# Patient Record
Sex: FEMALE | Race: WHITE | Employment: OTHER | URBAN - METROPOLITAN AREA
[De-identification: names, ages, dates, MRNs, and addresses within clinical notes are randomized per-mention and may not be internally consistent; named-entity substitution may affect disease eponyms.]

---

## 2017-02-11 ENCOUNTER — LAB ENCOUNTER (OUTPATIENT)
Dept: LAB | Facility: HOSPITAL | Age: 77
End: 2017-02-11
Attending: INTERNAL MEDICINE
Payer: MEDICARE

## 2017-02-11 DIAGNOSIS — N18.30 CHRONIC KIDNEY DISEASE, STAGE III (MODERATE) (HCC): Primary | ICD-10-CM

## 2017-02-11 DIAGNOSIS — E11.9 DIABETES MELLITUS WITHOUT COMPLICATION (HCC): ICD-10-CM

## 2017-02-11 DIAGNOSIS — D64.9 ANEMIA, UNSPECIFIED: ICD-10-CM

## 2017-02-11 DIAGNOSIS — E55.9 VITAMIN D DEFICIENCY, UNSPECIFIED: ICD-10-CM

## 2017-02-11 DIAGNOSIS — E78.00 PURE HYPERCHOLESTEROLEMIA: ICD-10-CM

## 2017-02-11 LAB
ALBUMIN SERPL BCP-MCNC: 3.4 G/DL (ref 3.5–4.8)
ALBUMIN/GLOB SERPL: 0.9 {RATIO} (ref 1–2)
ALP SERPL-CCNC: 80 U/L (ref 32–100)
ALT SERPL-CCNC: 18 U/L (ref 14–54)
ANION GAP SERPL CALC-SCNC: 14 MMOL/L (ref 0–18)
AST SERPL-CCNC: 20 U/L (ref 15–41)
BASOPHILS # BLD: 0.1 K/UL (ref 0–0.2)
BASOPHILS NFR BLD: 1 %
BILIRUB SERPL-MCNC: 0.3 MG/DL (ref 0.3–1.2)
BUN SERPL-MCNC: 15 MG/DL (ref 8–20)
BUN/CREAT SERPL: 14.6 (ref 10–20)
CALCIUM SERPL-MCNC: 9.1 MG/DL (ref 8.5–10.5)
CHLORIDE SERPL-SCNC: 105 MMOL/L (ref 95–110)
CHOLEST SERPL-MCNC: 119 MG/DL (ref 110–200)
CO2 SERPL-SCNC: 24 MMOL/L (ref 22–32)
CREAT SERPL-MCNC: 1.03 MG/DL (ref 0.5–1.5)
CREAT UR-MCNC: 116.4 MG/DL
EOSINOPHIL # BLD: 0.3 K/UL (ref 0–0.7)
EOSINOPHIL NFR BLD: 3 %
ERYTHROCYTE [DISTWIDTH] IN BLOOD BY AUTOMATED COUNT: 13.8 % (ref 11–15)
FERRITIN SERPL IA-MCNC: 323 NG/ML (ref 11–307)
GLOBULIN PLAS-MCNC: 3.9 G/DL (ref 2.5–3.7)
GLUCOSE SERPL-MCNC: 127 MG/DL (ref 70–99)
HBA1C MFR BLD: 6.2 % (ref 4–6)
HCT VFR BLD AUTO: 32 % (ref 35–48)
HDLC SERPL-MCNC: 35 MG/DL
HGB BLD-MCNC: 10.7 G/DL (ref 12–16)
IRON SATN MFR SERPL: 19 % (ref 15–50)
IRON SERPL-MCNC: 60 MCG/DL (ref 28–170)
LDLC SERPL CALC-MCNC: 51 MG/DL (ref 0–99)
LYMPHOCYTES # BLD: 2.3 K/UL (ref 1–4)
LYMPHOCYTES NFR BLD: 25 %
MCH RBC QN AUTO: 30.4 PG (ref 27–32)
MCHC RBC AUTO-ENTMCNC: 33.5 G/DL (ref 32–37)
MCV RBC AUTO: 90.7 FL (ref 80–100)
METAMYELOCYTES # BLD MANUAL: 0.28 K/UL
MICROALBUMIN UR-MCNC: 2.2 MG/DL (ref 0–1.8)
MICROALBUMIN/CREAT UR: 18.9 MG/G{CREAT} (ref 0–20)
MONOCYTES # BLD: 0.7 K/UL (ref 0–1)
MONOCYTES NFR BLD: 7 %
MYELOCYTES NFR BLD: 3 %
NEUTROPHILS # BLD AUTO: 5.6 K/UL (ref 1.8–7.7)
NEUTROPHILS NFR BLD: 51 %
NEUTS BAND NFR BLD: 10 %
NONHDLC SERPL-MCNC: 84 MG/DL
OSMOLALITY UR CALC.SUM OF ELEC: 298 MOSM/KG (ref 275–295)
PLATELET # BLD AUTO: 354 K/UL (ref 140–400)
PMV BLD AUTO: 8.9 FL (ref 7.4–10.3)
POTASSIUM SERPL-SCNC: 3.3 MMOL/L (ref 3.3–5.1)
PROT SERPL-MCNC: 7.3 G/DL (ref 5.9–8.4)
RBC # BLD AUTO: 3.53 M/UL (ref 3.7–5.4)
SODIUM SERPL-SCNC: 143 MMOL/L (ref 136–144)
TIBC SERPL-MCNC: 312 MCG/DL (ref 228–428)
TRANSFERRIN SERPL-MCNC: 236 MG/DL (ref 192–382)
TRIGL SERPL-MCNC: 166 MG/DL (ref 1–149)
TSH SERPL-ACNC: 5.55 UIU/ML (ref 0.34–5.6)
WBC # BLD AUTO: 9.3 K/UL (ref 4–11)

## 2017-02-11 PROCEDURE — 84466 ASSAY OF TRANSFERRIN: CPT

## 2017-02-11 PROCEDURE — 85007 BL SMEAR W/DIFF WBC COUNT: CPT

## 2017-02-11 PROCEDURE — 84443 ASSAY THYROID STIM HORMONE: CPT

## 2017-02-11 PROCEDURE — 80061 LIPID PANEL: CPT

## 2017-02-11 PROCEDURE — 83540 ASSAY OF IRON: CPT

## 2017-02-11 PROCEDURE — 82043 UR ALBUMIN QUANTITATIVE: CPT

## 2017-02-11 PROCEDURE — 82728 ASSAY OF FERRITIN: CPT

## 2017-02-11 PROCEDURE — 36415 COLL VENOUS BLD VENIPUNCTURE: CPT

## 2017-02-11 PROCEDURE — 83036 HEMOGLOBIN GLYCOSYLATED A1C: CPT

## 2017-02-11 PROCEDURE — 82306 VITAMIN D 25 HYDROXY: CPT

## 2017-02-11 PROCEDURE — 80053 COMPREHEN METABOLIC PANEL: CPT

## 2017-02-11 PROCEDURE — 85025 COMPLETE CBC W/AUTO DIFF WBC: CPT

## 2017-02-11 PROCEDURE — 82570 ASSAY OF URINE CREATININE: CPT

## 2017-02-11 PROCEDURE — 85027 COMPLETE CBC AUTOMATED: CPT

## 2017-02-13 LAB — 25(OH)D3 SERPL-MCNC: 32.2 NG/ML

## 2017-03-03 ENCOUNTER — LAB REQUISITION (OUTPATIENT)
Dept: LAB | Facility: HOSPITAL | Age: 77
End: 2017-03-03
Payer: MEDICARE

## 2017-03-03 DIAGNOSIS — R77.1 ABNORMALITY OF GLOBULIN: ICD-10-CM

## 2017-03-03 DIAGNOSIS — D64.9 ANEMIA: ICD-10-CM

## 2017-03-03 LAB
ALBUMIN SERPL BCP-MCNC: 3.7 G/DL (ref 3.5–4.8)
ALBUMIN/GLOB SERPL: 1.1 {RATIO} (ref 1–2)
ALP SERPL-CCNC: 72 U/L (ref 32–100)
ALT SERPL-CCNC: 14 U/L (ref 14–54)
ANION GAP SERPL CALC-SCNC: 9 MMOL/L (ref 0–18)
AST SERPL-CCNC: 18 U/L (ref 15–41)
BASOPHILS # BLD: 0.1 K/UL (ref 0–0.2)
BASOPHILS NFR BLD: 1 %
BILIRUB SERPL-MCNC: 0.7 MG/DL (ref 0.3–1.2)
BUN SERPL-MCNC: 14 MG/DL (ref 8–20)
BUN/CREAT SERPL: 15.9 (ref 10–20)
CALCIUM SERPL-MCNC: 8.7 MG/DL (ref 8.5–10.5)
CHLORIDE SERPL-SCNC: 109 MMOL/L (ref 95–110)
CO2 SERPL-SCNC: 23 MMOL/L (ref 22–32)
CREAT SERPL-MCNC: 0.88 MG/DL (ref 0.5–1.5)
EOSINOPHIL # BLD: 0.3 K/UL (ref 0–0.7)
EOSINOPHIL NFR BLD: 5 %
ERYTHROCYTE [DISTWIDTH] IN BLOOD BY AUTOMATED COUNT: 15.1 % (ref 11–15)
GLOBULIN PLAS-MCNC: 3.3 G/DL (ref 2.5–3.7)
GLUCOSE SERPL-MCNC: 130 MG/DL (ref 70–99)
HCT VFR BLD AUTO: 31 % (ref 35–48)
HGB BLD-MCNC: 10.1 G/DL (ref 12–16)
LYMPHOCYTES # BLD: 1.6 K/UL (ref 1–4)
LYMPHOCYTES NFR BLD: 27 %
MCH RBC QN AUTO: 29.9 PG (ref 27–32)
MCHC RBC AUTO-ENTMCNC: 32.5 G/DL (ref 32–37)
MCV RBC AUTO: 91.8 FL (ref 80–100)
MONOCYTES # BLD: 0.4 K/UL (ref 0–1)
MONOCYTES NFR BLD: 8 %
NEUTROPHILS # BLD AUTO: 3.5 K/UL (ref 1.8–7.7)
NEUTROPHILS NFR BLD: 60 %
OSMOLALITY UR CALC.SUM OF ELEC: 294 MOSM/KG (ref 275–295)
PLATELET # BLD AUTO: 266 K/UL (ref 140–400)
PMV BLD AUTO: 8.6 FL (ref 7.4–10.3)
POTASSIUM SERPL-SCNC: 3.3 MMOL/L (ref 3.3–5.1)
PROT SERPL-MCNC: 7 G/DL (ref 5.9–8.4)
RBC # BLD AUTO: 3.38 M/UL (ref 3.7–5.4)
SODIUM SERPL-SCNC: 141 MMOL/L (ref 136–144)
WBC # BLD AUTO: 5.8 K/UL (ref 4–11)

## 2017-03-03 PROCEDURE — 80053 COMPREHEN METABOLIC PANEL: CPT | Performed by: INTERNAL MEDICINE

## 2017-03-03 PROCEDURE — 84165 PROTEIN E-PHORESIS SERUM: CPT | Performed by: INTERNAL MEDICINE

## 2017-03-03 PROCEDURE — 85025 COMPLETE CBC W/AUTO DIFF WBC: CPT | Performed by: INTERNAL MEDICINE

## 2017-03-07 LAB
ALBUMIN SERPL ELPH-MCNC: 4.17 G/DL (ref 3.8–5.8)
ALBUMIN/GLOB SERPL: 1.48 {RATIO} (ref 1–2)
ALPHA1 GLOB SERPL ELPH-MCNC: 0.19 G/DL (ref 0.1–0.3)
ALPHA2 GLOB SERPL ELPH-MCNC: 0.83 G/DL (ref 0.6–1)
B-GLOBULIN SERPL ELPH-MCNC: 0.97 G/DL (ref 0.7–1.3)
GAMMA GLOB SERPL ELPH-MCNC: 0.84 G/DL (ref 0.5–1.7)
TOTAL PROTEIN (SPECIAL TESTING): 7 G/DL (ref 6.5–9.1)

## 2018-03-23 ENCOUNTER — LAB REQUISITION (OUTPATIENT)
Dept: LAB | Facility: HOSPITAL | Age: 78
End: 2018-03-23
Payer: MEDICARE

## 2018-03-23 ENCOUNTER — PRIOR ORIGINAL RECORDS (OUTPATIENT)
Dept: OTHER | Age: 78
End: 2018-03-23

## 2018-03-23 DIAGNOSIS — D51.0 VITAMIN B12 DEFICIENCY ANEMIA DUE TO INTRINSIC FACTOR DEFICIENCY: ICD-10-CM

## 2018-03-23 DIAGNOSIS — E55.9 VITAMIN D DEFICIENCY: ICD-10-CM

## 2018-03-23 DIAGNOSIS — E11.9 TYPE 2 DIABETES MELLITUS WITHOUT COMPLICATIONS (HCC): ICD-10-CM

## 2018-03-23 DIAGNOSIS — E78.00 PURE HYPERCHOLESTEROLEMIA: ICD-10-CM

## 2018-03-23 LAB
ALBUMIN SERPL BCP-MCNC: 4 G/DL (ref 3.5–4.8)
ALBUMIN/GLOB SERPL: 1.1 {RATIO} (ref 1–2)
ALP SERPL-CCNC: 77 U/L (ref 32–100)
ALT SERPL-CCNC: 14 U/L (ref 14–54)
ANION GAP SERPL CALC-SCNC: 6 MMOL/L (ref 0–18)
AST SERPL-CCNC: 21 U/L (ref 15–41)
BASOPHILS # BLD: 0.1 K/UL (ref 0–0.2)
BASOPHILS NFR BLD: 1 %
BILIRUB SERPL-MCNC: 0.7 MG/DL (ref 0.3–1.2)
BUN SERPL-MCNC: 15 MG/DL (ref 8–20)
BUN/CREAT SERPL: 17.6 (ref 10–20)
CALCIUM SERPL-MCNC: 9.2 MG/DL (ref 8.5–10.5)
CHLORIDE SERPL-SCNC: 104 MMOL/L (ref 95–110)
CHOLEST SERPL-MCNC: 134 MG/DL (ref 110–200)
CO2 SERPL-SCNC: 28 MMOL/L (ref 22–32)
CREAT SERPL-MCNC: 0.85 MG/DL (ref 0.5–1.5)
EOSINOPHIL # BLD: 0.3 K/UL (ref 0–0.7)
EOSINOPHIL NFR BLD: 3 %
ERYTHROCYTE [DISTWIDTH] IN BLOOD BY AUTOMATED COUNT: 14.9 % (ref 11–15)
GLOBULIN PLAS-MCNC: 3.5 G/DL (ref 2.5–3.7)
GLUCOSE SERPL-MCNC: 131 MG/DL (ref 70–99)
HBA1C MFR BLD: 6.3 % (ref 4–6)
HCT VFR BLD AUTO: 32.3 % (ref 35–48)
HDLC SERPL-MCNC: 55 MG/DL
HGB BLD-MCNC: 10.8 G/DL (ref 12–16)
LDLC SERPL CALC-MCNC: 58 MG/DL (ref 0–99)
LYMPHOCYTES # BLD: 2 K/UL (ref 1–4)
LYMPHOCYTES NFR BLD: 23 %
MCH RBC QN AUTO: 28.9 PG (ref 27–32)
MCHC RBC AUTO-ENTMCNC: 33.4 G/DL (ref 32–37)
MCV RBC AUTO: 86.6 FL (ref 80–100)
MONOCYTES # BLD: 0.6 K/UL (ref 0–1)
MONOCYTES NFR BLD: 7 %
NEUTROPHILS # BLD AUTO: 5.6 K/UL (ref 1.8–7.7)
NEUTROPHILS NFR BLD: 66 %
NONHDLC SERPL-MCNC: 79 MG/DL
OSMOLALITY UR CALC.SUM OF ELEC: 289 MOSM/KG (ref 275–295)
PLATELET # BLD AUTO: 324 K/UL (ref 140–400)
PMV BLD AUTO: 8.3 FL (ref 7.4–10.3)
POTASSIUM SERPL-SCNC: 3.2 MMOL/L (ref 3.3–5.1)
PROT SERPL-MCNC: 7.5 G/DL (ref 5.9–8.4)
RBC # BLD AUTO: 3.73 M/UL (ref 3.7–5.4)
SODIUM SERPL-SCNC: 138 MMOL/L (ref 136–144)
TRIGL SERPL-MCNC: 104 MG/DL (ref 1–149)
TSH SERPL-ACNC: 0.62 UIU/ML (ref 0.45–5.33)
VIT B12 SERPL-MCNC: 212 PG/ML (ref 181–914)
WBC # BLD AUTO: 8.5 K/UL (ref 4–11)

## 2018-03-23 PROCEDURE — 83036 HEMOGLOBIN GLYCOSYLATED A1C: CPT | Performed by: INTERNAL MEDICINE

## 2018-03-23 PROCEDURE — 82306 VITAMIN D 25 HYDROXY: CPT | Performed by: INTERNAL MEDICINE

## 2018-03-23 PROCEDURE — 84443 ASSAY THYROID STIM HORMONE: CPT | Performed by: INTERNAL MEDICINE

## 2018-03-23 PROCEDURE — 80053 COMPREHEN METABOLIC PANEL: CPT | Performed by: INTERNAL MEDICINE

## 2018-03-23 PROCEDURE — 85025 COMPLETE CBC W/AUTO DIFF WBC: CPT | Performed by: INTERNAL MEDICINE

## 2018-03-23 PROCEDURE — 82607 VITAMIN B-12: CPT | Performed by: INTERNAL MEDICINE

## 2018-03-23 PROCEDURE — 80061 LIPID PANEL: CPT | Performed by: INTERNAL MEDICINE

## 2018-03-26 LAB — 25(OH)D3 SERPL-MCNC: 25.3 NG/ML

## 2018-04-10 ENCOUNTER — LAB REQUISITION (OUTPATIENT)
Dept: LAB | Facility: HOSPITAL | Age: 78
End: 2018-04-10
Payer: MEDICARE

## 2018-04-10 DIAGNOSIS — E11.9 TYPE 2 DIABETES MELLITUS WITHOUT COMPLICATIONS (HCC): ICD-10-CM

## 2018-04-10 PROCEDURE — 82570 ASSAY OF URINE CREATININE: CPT | Performed by: INTERNAL MEDICINE

## 2018-04-10 PROCEDURE — 81001 URINALYSIS AUTO W/SCOPE: CPT | Performed by: INTERNAL MEDICINE

## 2018-04-10 PROCEDURE — 82043 UR ALBUMIN QUANTITATIVE: CPT | Performed by: INTERNAL MEDICINE

## 2018-04-10 PROCEDURE — 87086 URINE CULTURE/COLONY COUNT: CPT | Performed by: INTERNAL MEDICINE

## 2018-05-29 ENCOUNTER — LAB REQUISITION (OUTPATIENT)
Dept: LAB | Facility: HOSPITAL | Age: 78
End: 2018-05-29
Payer: MEDICARE

## 2018-05-29 DIAGNOSIS — E87.6 HYPOKALEMIA: ICD-10-CM

## 2018-05-29 PROCEDURE — 84132 ASSAY OF SERUM POTASSIUM: CPT | Performed by: INTERNAL MEDICINE

## 2018-06-02 ENCOUNTER — HOSPITAL ENCOUNTER (OUTPATIENT)
Dept: CV DIAGNOSTICS | Facility: HOSPITAL | Age: 78
Discharge: HOME | End: 2018-06-02
Attending: INTERNAL MEDICINE
Payer: MEDICARE

## 2018-06-02 DIAGNOSIS — R00.2 PALPITATIONS: ICD-10-CM

## 2018-06-02 PROCEDURE — 0296T CARD MONITOR HOLTER 72 HOUR: CPT | Performed by: INTERNAL MEDICINE

## 2018-06-02 PROCEDURE — 0297T CARD MONITOR HOLTER 72 HOUR: CPT | Performed by: INTERNAL MEDICINE

## 2018-06-02 PROCEDURE — 0298T CARD MONITOR HOLTER 72 HOUR: CPT | Performed by: INTERNAL MEDICINE

## 2018-06-27 LAB
ALBUMIN: 4 G/DL
ALKALINE PHOSPHATATE(ALK PHOS): 77 IU/L
ALT (SGPT): 14 U/L
AST (SGOT): 21 U/L
BILIRUBIN TOTAL: 0.7 MG/DL
BUN: 15 MG/DL
CALCIUM: 9.2 MG/DL
CHLORIDE: 104 MEQ/L
CHOLESTEROL, TOTAL: 134 MG/DL
CREATININE, SERUM: 0.85 MG/DL
GLOBULIN: 3.5 G/DL
GLUCOSE: 131 MG/DL
GLUCOSE: 131 MG/DL
HDL CHOLESTEROL: 55 MG/DL
HEMATOCRIT: 32.3 %
HEMOGLOBIN: 10.8 G/DL
LDL CHOLESTEROL: 58 MG/DL
NON-HDL CHOLESTEROL: 179 MG/DL
PLATELETS: 324 K/UL
POTASSIUM, SERUM: 3.2 MEQ/L
PROTEIN, TOTAL: 7.5 G/DL
RED BLOOD COUNT: 3.73 X 10-6/U
SGOT (AST): 21 IU/L
SGPT (ALT): 14 IU/L
SODIUM: 138 MEQ/L
TRIGLYCERIDES: 104 MG/DL
WHITE BLOOD COUNT: 8.5 X 10-3/U

## 2018-06-28 ENCOUNTER — PRIOR ORIGINAL RECORDS (OUTPATIENT)
Dept: OTHER | Age: 78
End: 2018-06-28

## 2018-06-28 ENCOUNTER — MYAURORA ACCOUNT LINK (OUTPATIENT)
Dept: OTHER | Age: 78
End: 2018-06-28

## 2018-07-12 ENCOUNTER — MYAURORA ACCOUNT LINK (OUTPATIENT)
Dept: OTHER | Age: 78
End: 2018-07-12

## 2018-07-17 ENCOUNTER — PRIOR ORIGINAL RECORDS (OUTPATIENT)
Dept: OTHER | Age: 78
End: 2018-07-17

## 2018-08-06 ENCOUNTER — PRIOR ORIGINAL RECORDS (OUTPATIENT)
Dept: OTHER | Age: 78
End: 2018-08-06

## 2018-08-16 ENCOUNTER — PRIOR ORIGINAL RECORDS (OUTPATIENT)
Dept: OTHER | Age: 78
End: 2018-08-16

## 2018-08-16 ENCOUNTER — MYAURORA ACCOUNT LINK (OUTPATIENT)
Dept: OTHER | Age: 78
End: 2018-08-16

## 2018-09-18 ENCOUNTER — LAB ENCOUNTER (OUTPATIENT)
Dept: LAB | Facility: HOSPITAL | Age: 78
End: 2018-09-18
Attending: INTERNAL MEDICINE
Payer: MEDICARE

## 2018-09-18 DIAGNOSIS — R53.83 FATIGUE: Primary | ICD-10-CM

## 2018-09-18 LAB
ALBUMIN SERPL BCP-MCNC: 3.5 G/DL (ref 3.5–4.8)
ALBUMIN/GLOB SERPL: 0.8 {RATIO} (ref 1–2)
ALP SERPL-CCNC: 80 U/L (ref 32–100)
ALT SERPL-CCNC: 18 U/L (ref 14–54)
ANION GAP SERPL CALC-SCNC: 11 MMOL/L (ref 0–18)
AST SERPL-CCNC: 20 U/L (ref 15–41)
BASOPHILS # BLD: 0.1 K/UL (ref 0–0.2)
BASOPHILS NFR BLD: 1 %
BILIRUB SERPL-MCNC: 0.4 MG/DL (ref 0.3–1.2)
BUN SERPL-MCNC: 21 MG/DL (ref 8–20)
BUN/CREAT SERPL: 22.6 (ref 10–20)
CALCIUM SERPL-MCNC: 8.6 MG/DL (ref 8.5–10.5)
CHLORIDE SERPL-SCNC: 100 MMOL/L (ref 95–110)
CO2 SERPL-SCNC: 26 MMOL/L (ref 22–32)
CREAT SERPL-MCNC: 0.93 MG/DL (ref 0.5–1.5)
EOSINOPHIL # BLD: 0 K/UL (ref 0–0.7)
EOSINOPHIL NFR BLD: 0 %
ERYTHROCYTE [DISTWIDTH] IN BLOOD BY AUTOMATED COUNT: 15.1 % (ref 11–15)
GLOBULIN PLAS-MCNC: 4.5 G/DL (ref 2.5–3.7)
GLUCOSE SERPL-MCNC: 154 MG/DL (ref 70–99)
HCT VFR BLD AUTO: 27.5 % (ref 35–48)
HGB BLD-MCNC: 8.9 G/DL (ref 12–16)
LYMPHOCYTES # BLD: 1.2 K/UL (ref 1–4)
LYMPHOCYTES NFR BLD: 10 %
MCH RBC QN AUTO: 27.6 PG (ref 27–32)
MCHC RBC AUTO-ENTMCNC: 32.3 G/DL (ref 32–37)
MCV RBC AUTO: 85.4 FL (ref 80–100)
MONOCYTES # BLD: 0.7 K/UL (ref 0–1)
MONOCYTES NFR BLD: 6 %
NEUTROPHILS # BLD AUTO: 10.2 K/UL (ref 1.8–7.7)
NEUTROPHILS NFR BLD: 84 %
OSMOLALITY UR CALC.SUM OF ELEC: 290 MOSM/KG (ref 275–295)
PATIENT FASTING: NO
PLATELET # BLD AUTO: 430 K/UL (ref 140–400)
PMV BLD AUTO: 8.1 FL (ref 7.4–10.3)
POTASSIUM SERPL-SCNC: 2.6 MMOL/L (ref 3.3–5.1)
PROT SERPL-MCNC: 8 G/DL (ref 5.9–8.4)
RBC # BLD AUTO: 3.22 M/UL (ref 3.7–5.4)
SODIUM SERPL-SCNC: 137 MMOL/L (ref 136–144)
TSH SERPL-ACNC: 2.75 UIU/ML (ref 0.45–5.33)
WBC # BLD AUTO: 12.1 K/UL (ref 4–11)

## 2018-09-18 PROCEDURE — 36415 COLL VENOUS BLD VENIPUNCTURE: CPT

## 2018-09-18 PROCEDURE — 80053 COMPREHEN METABOLIC PANEL: CPT

## 2018-09-18 PROCEDURE — 84443 ASSAY THYROID STIM HORMONE: CPT

## 2018-09-18 PROCEDURE — 85025 COMPLETE CBC W/AUTO DIFF WBC: CPT

## 2018-09-21 ENCOUNTER — LAB REQUISITION (OUTPATIENT)
Dept: LAB | Facility: HOSPITAL | Age: 78
End: 2018-09-21
Payer: MEDICARE

## 2018-09-21 DIAGNOSIS — R53.83 OTHER FATIGUE: ICD-10-CM

## 2018-09-21 LAB
ALBUMIN SERPL BCP-MCNC: 3.4 G/DL (ref 3.5–4.8)
ALBUMIN/GLOB SERPL: 0.8 {RATIO} (ref 1–2)
ALP SERPL-CCNC: 75 U/L (ref 32–100)
ALT SERPL-CCNC: 23 U/L (ref 14–54)
ANION GAP SERPL CALC-SCNC: 9 MMOL/L (ref 0–18)
AST SERPL-CCNC: 21 U/L (ref 15–41)
BASOPHILS # BLD: 0.1 K/UL (ref 0–0.2)
BASOPHILS NFR BLD: 1 %
BILIRUB SERPL-MCNC: 0.4 MG/DL (ref 0.3–1.2)
BUN SERPL-MCNC: 20 MG/DL (ref 8–20)
BUN/CREAT SERPL: 20.4 (ref 10–20)
CALCIUM SERPL-MCNC: 9 MG/DL (ref 8.5–10.5)
CHLORIDE SERPL-SCNC: 105 MMOL/L (ref 95–110)
CO2 SERPL-SCNC: 25 MMOL/L (ref 22–32)
CREAT SERPL-MCNC: 0.98 MG/DL (ref 0.5–1.5)
EOSINOPHIL # BLD: 0.2 K/UL (ref 0–0.7)
EOSINOPHIL NFR BLD: 2 %
ERYTHROCYTE [DISTWIDTH] IN BLOOD BY AUTOMATED COUNT: 15.5 % (ref 11–15)
GLOBULIN PLAS-MCNC: 4.4 G/DL (ref 2.5–3.7)
GLUCOSE SERPL-MCNC: 125 MG/DL (ref 70–99)
HCT VFR BLD AUTO: 26.6 % (ref 35–48)
HGB BLD-MCNC: 8.5 G/DL (ref 12–16)
LYMPHOCYTES # BLD: 2.1 K/UL (ref 1–4)
LYMPHOCYTES NFR BLD: 20 %
MCH RBC QN AUTO: 27.4 PG (ref 27–32)
MCHC RBC AUTO-ENTMCNC: 31.8 G/DL (ref 32–37)
MCV RBC AUTO: 86.2 FL (ref 80–100)
MONOCYTES # BLD: 0.5 K/UL (ref 0–1)
MONOCYTES NFR BLD: 5 %
NEUTROPHILS # BLD AUTO: 7.3 K/UL (ref 1.8–7.7)
NEUTROPHILS NFR BLD: 71 %
OSMOLALITY UR CALC.SUM OF ELEC: 292 MOSM/KG (ref 275–295)
PLATELET # BLD AUTO: 418 K/UL (ref 140–400)
PMV BLD AUTO: 8.3 FL (ref 7.4–10.3)
POTASSIUM SERPL-SCNC: 3.1 MMOL/L (ref 3.3–5.1)
PROT SERPL-MCNC: 7.8 G/DL (ref 5.9–8.4)
RBC # BLD AUTO: 3.08 M/UL (ref 3.7–5.4)
SODIUM SERPL-SCNC: 139 MMOL/L (ref 136–144)
WBC # BLD AUTO: 10.3 K/UL (ref 4–11)

## 2018-09-21 PROCEDURE — 85025 COMPLETE CBC W/AUTO DIFF WBC: CPT | Performed by: INTERNAL MEDICINE

## 2018-09-21 PROCEDURE — 80053 COMPREHEN METABOLIC PANEL: CPT | Performed by: INTERNAL MEDICINE

## 2018-09-27 ENCOUNTER — LAB ENCOUNTER (OUTPATIENT)
Dept: LAB | Age: 78
End: 2018-09-27
Attending: INTERNAL MEDICINE
Payer: MEDICARE

## 2018-09-27 DIAGNOSIS — R74.8 ELEVATED LIVER ENZYMES: Primary | ICD-10-CM

## 2018-09-27 PROCEDURE — 84165 PROTEIN E-PHORESIS SERUM: CPT

## 2018-09-27 PROCEDURE — 86334 IMMUNOFIX E-PHORESIS SERUM: CPT

## 2018-09-27 PROCEDURE — 36415 COLL VENOUS BLD VENIPUNCTURE: CPT

## 2018-10-05 PROCEDURE — 83516 IMMUNOASSAY NONANTIBODY: CPT | Performed by: INTERNAL MEDICINE

## 2018-10-05 PROCEDURE — 86256 FLUORESCENT ANTIBODY TITER: CPT | Performed by: INTERNAL MEDICINE

## 2018-10-05 PROCEDURE — 82784 ASSAY IGA/IGD/IGG/IGM EACH: CPT | Performed by: INTERNAL MEDICINE

## 2018-10-12 ENCOUNTER — OFFICE VISIT (OUTPATIENT)
Dept: HEMATOLOGY/ONCOLOGY | Facility: HOSPITAL | Age: 78
End: 2018-10-12
Attending: INTERNAL MEDICINE
Payer: MEDICARE

## 2018-10-12 VITALS
TEMPERATURE: 98 F | RESPIRATION RATE: 18 BRPM | SYSTOLIC BLOOD PRESSURE: 129 MMHG | WEIGHT: 137 LBS | HEART RATE: 101 BPM | BODY MASS INDEX: 25.21 KG/M2 | DIASTOLIC BLOOD PRESSURE: 70 MMHG | HEIGHT: 62 IN

## 2018-10-12 DIAGNOSIS — D64.9 ANEMIA, UNSPECIFIED TYPE: ICD-10-CM

## 2018-10-12 DIAGNOSIS — R19.7 DIARRHEA, UNSPECIFIED TYPE: ICD-10-CM

## 2018-10-12 DIAGNOSIS — C66.2 URETERAL CANCER, LEFT (HCC): ICD-10-CM

## 2018-10-12 DIAGNOSIS — C67.9 MALIGNANT NEOPLASM OF URINARY BLADDER, UNSPECIFIED SITE (HCC): ICD-10-CM

## 2018-10-12 DIAGNOSIS — D89.0 POLYCLONAL GAMMOPATHY: Primary | ICD-10-CM

## 2018-10-12 PROCEDURE — 99205 OFFICE O/P NEW HI 60 MIN: CPT | Performed by: INTERNAL MEDICINE

## 2018-10-15 NOTE — CONSULTS
[facility]    PATIENT'S NAME: Kit NINA   CONSULTING PHYSICIAN: Alida Goodwin.  Jade Ruibo MD   PATIENT ACCOUNT #: [de-identified] LOCATION: [address] [place] [address] RECORD #: [id number] YOB: 1940   CONSULTATION DATE: 10/12/2018       CANCER C or chills. She denies any focal neurological deficits. She denies any bone pain. 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   Nonmuscle invasive pernicious anemia, long-term B12 replacement, hypertension, history of a strangulated h patient has 2 relatively faint proteins on her electrophoresis/immunofixation as noted above. These are of unclear significance at this time. She does have an anemia with a markedly elevated ferritin indicating anemia of chronic disease/inflammation.   We

## 2018-11-12 ENCOUNTER — LAB ENCOUNTER (OUTPATIENT)
Dept: LAB | Facility: HOSPITAL | Age: 78
End: 2018-11-12
Attending: INTERNAL MEDICINE
Payer: MEDICARE

## 2018-11-12 DIAGNOSIS — D64.9 ANEMIA, UNSPECIFIED TYPE: ICD-10-CM

## 2018-11-12 DIAGNOSIS — D89.0 POLYCLONAL GAMMOPATHY: ICD-10-CM

## 2018-11-12 PROCEDURE — 85025 COMPLETE CBC W/AUTO DIFF WBC: CPT

## 2018-11-12 PROCEDURE — 36415 COLL VENOUS BLD VENIPUNCTURE: CPT

## 2018-11-12 PROCEDURE — 84466 ASSAY OF TRANSFERRIN: CPT

## 2018-11-12 PROCEDURE — 83883 ASSAY NEPHELOMETRY NOT SPEC: CPT

## 2018-11-12 PROCEDURE — 82728 ASSAY OF FERRITIN: CPT

## 2018-11-12 PROCEDURE — 82607 VITAMIN B-12: CPT

## 2018-11-12 PROCEDURE — 83540 ASSAY OF IRON: CPT

## 2018-11-20 ENCOUNTER — NURSE NAVIGATOR ENCOUNTER (OUTPATIENT)
Dept: HEMATOLOGY/ONCOLOGY | Facility: HOSPITAL | Age: 78
End: 2018-11-20

## 2018-11-20 ENCOUNTER — OFFICE VISIT (OUTPATIENT)
Dept: HEMATOLOGY/ONCOLOGY | Facility: HOSPITAL | Age: 78
End: 2018-11-20
Attending: INTERNAL MEDICINE
Payer: MEDICARE

## 2018-11-20 VITALS
HEIGHT: 62 IN | DIASTOLIC BLOOD PRESSURE: 67 MMHG | SYSTOLIC BLOOD PRESSURE: 145 MMHG | TEMPERATURE: 99 F | BODY MASS INDEX: 25.58 KG/M2 | RESPIRATION RATE: 18 BRPM | WEIGHT: 139 LBS | HEART RATE: 78 BPM

## 2018-11-20 DIAGNOSIS — D64.9 ANEMIA, UNSPECIFIED TYPE: ICD-10-CM

## 2018-11-20 DIAGNOSIS — C20 RECTAL CANCER (HCC): Primary | ICD-10-CM

## 2018-11-20 DIAGNOSIS — C66.2 URETERAL CANCER, LEFT (HCC): ICD-10-CM

## 2018-11-20 DIAGNOSIS — D89.0 POLYCLONAL GAMMOPATHY: ICD-10-CM

## 2018-11-20 PROBLEM — D50.9 IRON DEFICIENCY ANEMIA: Status: ACTIVE | Noted: 2018-11-20

## 2018-11-20 PROCEDURE — 99215 OFFICE O/P EST HI 40 MIN: CPT | Performed by: INTERNAL MEDICINE

## 2018-11-20 NOTE — PROGRESS NOTES
New diagnosis of rectal cancer. Role of NN explained as providing:  Guidance to healthcare services to ensure continuity and coordination of care. Education about your diagnosis, treatment options and symptom management.   Support for patient, family

## 2018-11-20 NOTE — PROGRESS NOTES
Cancer Center Progress Note    Patient Name: Van Stevens   YOB: 1940   Medical Record Number: [id number]   Attending Physician: Loreta King M.D. Chief Complaint:  polyclonal gammopathy anemia ureter cancer.   rectal cancer    Histo improved.       Performance Status:  ECOG 0  Past Medical History:  Past Medical History:   Diagnosis Date   • Anxiety state, unspecified    • Depression    • MGUS (monoclonal gammopathy of unknown significance)    • Pernicious anemia    • Type II or unspec Medications:    Current Outpatient Medications:   •  Potassium Chloride ER (KLOR-CON M20) 20 MEQ Oral Tab CR, Take 1 tablet (20 mEq total) by mouth 2 (two) times daily. , Disp: 60 tablet, Rfl: 0  •  Ferrous Sulfate 325 (65 Fe) MG Oral Tab, Take 1 tablet by not in acute distress. Psych:  Mood and affect appropriate  HEENT: EOMs intact. PERRL. Oropharynx is clear. Neck: No JVD. No palpable lymphadenopathy. Neck is supple. Lymphatics:  There is no palpable peripheral lymphadenopathy   Chest: Symmetric expans were discussed. Appropriate resources were reviewed and discussed with the pateint and family.      Guanako Wang MD

## 2018-11-27 ENCOUNTER — TELEPHONE (OUTPATIENT)
Dept: HEMATOLOGY/ONCOLOGY | Facility: HOSPITAL | Age: 78
End: 2018-11-27

## 2018-11-27 ENCOUNTER — HOSPITAL ENCOUNTER (OUTPATIENT)
Dept: MRI IMAGING | Age: 78
Discharge: HOME | End: 2018-11-27
Attending: INTERNAL MEDICINE
Payer: MEDICARE

## 2018-11-27 DIAGNOSIS — C20 RECTAL CANCER (HCC): ICD-10-CM

## 2018-11-27 PROCEDURE — 72197 MRI PELVIS W/O & W/DYE: CPT | Performed by: INTERNAL MEDICINE

## 2018-11-27 PROCEDURE — A9575 INJ GADOTERATE MEGLUMI 0.1ML: HCPCS | Performed by: INTERNAL MEDICINE

## 2018-11-27 NOTE — TELEPHONE ENCOUNTER
MICHAELI- called pt on 11/21 & 11/27 to schedule feraheme infusions. Messages have been left- pt hasnt called back to schedule.

## 2018-11-29 ENCOUNTER — OFFICE VISIT (OUTPATIENT)
Dept: HEMATOLOGY/ONCOLOGY | Facility: HOSPITAL | Age: 78
End: 2018-11-29
Attending: INTERNAL MEDICINE
Payer: MEDICARE

## 2018-11-29 VITALS
DIASTOLIC BLOOD PRESSURE: 81 MMHG | TEMPERATURE: 98 F | HEART RATE: 96 BPM | WEIGHT: 136.44 LBS | RESPIRATION RATE: 18 BRPM | SYSTOLIC BLOOD PRESSURE: 153 MMHG | BODY MASS INDEX: 25.11 KG/M2 | HEIGHT: 62 IN

## 2018-11-29 DIAGNOSIS — C66.2 URETERAL CANCER, LEFT (HCC): ICD-10-CM

## 2018-11-29 DIAGNOSIS — N83.8 OVARIAN MASS, LEFT: Primary | ICD-10-CM

## 2018-11-29 DIAGNOSIS — C20 RECTAL CANCER (HCC): ICD-10-CM

## 2018-11-29 DIAGNOSIS — C67.9 MALIGNANT NEOPLASM OF URINARY BLADDER, UNSPECIFIED SITE (HCC): ICD-10-CM

## 2018-11-29 DIAGNOSIS — R19.09 OTHER INTRA-ABDOMINAL AND PELVIC SWELLING, MASS AND LUMP: ICD-10-CM

## 2018-11-29 PROCEDURE — 99215 OFFICE O/P EST HI 40 MIN: CPT | Performed by: INTERNAL MEDICINE

## 2018-11-29 NOTE — PROGRESS NOTES
Cancer Center Progress Note    Patient Name: Summer Escobar   YOB: 1940   Medical Record Number: [id number]   Attending Physician: Stephanie Chowdary M.D. Chief Complaint:  polyclonal gammopathy anemia ureter cancer.   rectal cancer    Histo any other sites of bruising or bleeding. Her energy level is improved.       Performance Status:  ECOG 0  Past Medical History:  Past Medical History:   Diagnosis Date   • Anxiety state, unspecified    • Depression    • MGUS (monoclonal gammopathy of unkno Self-Exams: Not Asked    Social History Narrative      Not on file      Current Medications:    Current Outpatient Medications:   •  Potassium Chloride ER (KLOR-CON M20) 20 MEQ Oral Tab CR, Take 1 tablet (20 mEq total) by mouth 2 (two) times daily. , Disp: Physical Examination:  General: Patient is alert and oriented x 3, not in acute distress. Psych:  Mood and affect appropriate  HEENT: EOMs intact. PERRL. Oropharynx is clear. Neck: No JVD. No palpable lymphadenopathy. Neck is supple. Lymphatics:  Bob Deter nonmalignant pathology    Previous history of bowel resection with Dr. Sonido Matute       Return To clinic 1 month    Risk assessment: High new ovarian/pelvic mass rectal cancer history of bladder cancer    The patient's emotional well being was assessed and

## 2018-11-30 ENCOUNTER — TELEPHONE (OUTPATIENT)
Dept: HEMATOLOGY/ONCOLOGY | Facility: HOSPITAL | Age: 78
End: 2018-11-30

## 2018-11-30 NOTE — TELEPHONE ENCOUNTER
Marixa Hillman, a referral call for an appointment to Dr Riccardo North office was made. That office will contact patient to set up appt. I wanted to let her know that they will be calling her. Patient verbalizes understanding.

## 2018-12-03 ENCOUNTER — APPOINTMENT (OUTPATIENT)
Dept: HEMATOLOGY/ONCOLOGY | Facility: HOSPITAL | Age: 78
End: 2018-12-03
Payer: MEDICARE

## 2018-12-04 ENCOUNTER — APPOINTMENT (OUTPATIENT)
Dept: HEMATOLOGY/ONCOLOGY | Facility: HOSPITAL | Age: 78
End: 2018-12-04
Attending: INTERNAL MEDICINE
Payer: MEDICARE

## 2018-12-05 ENCOUNTER — OFFICE VISIT (OUTPATIENT)
Dept: HEMATOLOGY/ONCOLOGY | Facility: HOSPITAL | Age: 78
End: 2018-12-05
Attending: INTERNAL MEDICINE
Payer: MEDICARE

## 2018-12-05 VITALS
RESPIRATION RATE: 16 BRPM | SYSTOLIC BLOOD PRESSURE: 119 MMHG | DIASTOLIC BLOOD PRESSURE: 60 MMHG | HEART RATE: 79 BPM | TEMPERATURE: 98 F

## 2018-12-05 DIAGNOSIS — R19.09 OTHER INTRA-ABDOMINAL AND PELVIC SWELLING, MASS AND LUMP: ICD-10-CM

## 2018-12-05 DIAGNOSIS — D50.9 IRON DEFICIENCY ANEMIA: Primary | ICD-10-CM

## 2018-12-05 DIAGNOSIS — N83.8 OVARIAN MASS, LEFT: ICD-10-CM

## 2018-12-05 PROCEDURE — 86304 IMMUNOASSAY TUMOR CA 125: CPT

## 2018-12-05 PROCEDURE — A4216 STERILE WATER/SALINE, 10 ML: HCPCS

## 2018-12-05 PROCEDURE — 96374 THER/PROPH/DIAG INJ IV PUSH: CPT

## 2018-12-05 RX ORDER — 0.9 % SODIUM CHLORIDE 0.9 %
VIAL (ML) INJECTION
Status: DISCONTINUED
Start: 2018-12-05 | End: 2018-12-05

## 2018-12-05 RX ORDER — SODIUM CHLORIDE 9 MG/ML
INJECTION, SOLUTION INTRAVENOUS
Status: DISCONTINUED
Start: 2018-12-05 | End: 2018-12-05

## 2018-12-05 NOTE — PROGRESS NOTES
Patient arrives for feraheme 1 of 2. Patient reports she is okay, states she is very fatigued and nervous. Patient has surgery scheduled for next Thursday. Educated patient on what to expect while getting the feraheme.  PIV started in right AC, positive blo

## 2018-12-11 ENCOUNTER — OFFICE VISIT (OUTPATIENT)
Dept: HEMATOLOGY/ONCOLOGY | Facility: HOSPITAL | Age: 78
End: 2018-12-11
Attending: INTERNAL MEDICINE
Payer: MEDICARE

## 2018-12-11 VITALS
DIASTOLIC BLOOD PRESSURE: 56 MMHG | SYSTOLIC BLOOD PRESSURE: 125 MMHG | TEMPERATURE: 98 F | HEART RATE: 73 BPM | RESPIRATION RATE: 16 BRPM

## 2018-12-11 DIAGNOSIS — D50.9 IRON DEFICIENCY ANEMIA: Primary | ICD-10-CM

## 2018-12-11 PROCEDURE — 96374 THER/PROPH/DIAG INJ IV PUSH: CPT

## 2018-12-11 PROCEDURE — A4216 STERILE WATER/SALINE, 10 ML: HCPCS

## 2018-12-11 RX ORDER — 0.9 % SODIUM CHLORIDE 0.9 %
VIAL (ML) INJECTION
Status: DISCONTINUED
Start: 2018-12-11 | End: 2018-12-11

## 2018-12-11 RX ORDER — SODIUM CHLORIDE 9 MG/ML
INJECTION, SOLUTION INTRAVENOUS
Status: DISCONTINUED
Start: 2018-12-11 | End: 2018-12-11

## 2018-12-11 NOTE — PROGRESS NOTES
Patient arrives for feraheme 2 of 2. Patient reports she is okay, states she is very fatigued and nervous. Patient has surgery scheduled for next Thursday, reports a large mass on her ovary.       Patient observed 30 minutes post infusion, patient tolerated

## 2018-12-18 DIAGNOSIS — R97.1 ELEVATED CA-125: ICD-10-CM

## 2018-12-18 DIAGNOSIS — C20 RECTAL CANCER (HCC): Primary | ICD-10-CM

## 2018-12-18 DIAGNOSIS — C56.9 OVARIAN CANCER (HCC): ICD-10-CM

## 2018-12-20 ENCOUNTER — TELEPHONE (OUTPATIENT)
Dept: HEMATOLOGY/ONCOLOGY | Facility: HOSPITAL | Age: 78
End: 2018-12-20

## 2019-01-29 ENCOUNTER — NURSE ONLY (OUTPATIENT)
Dept: HEMATOLOGY/ONCOLOGY | Facility: HOSPITAL | Age: 79
End: 2019-01-29
Attending: INTERNAL MEDICINE
Payer: MEDICARE

## 2019-01-29 VITALS
TEMPERATURE: 98 F | RESPIRATION RATE: 18 BRPM | HEART RATE: 88 BPM | DIASTOLIC BLOOD PRESSURE: 65 MMHG | BODY MASS INDEX: 24.78 KG/M2 | SYSTOLIC BLOOD PRESSURE: 154 MMHG | WEIGHT: 134.69 LBS | HEIGHT: 62 IN

## 2019-01-29 DIAGNOSIS — C56.9 OVARIAN CANCER (HCC): ICD-10-CM

## 2019-01-29 DIAGNOSIS — C20 RECTAL CANCER (HCC): ICD-10-CM

## 2019-01-29 DIAGNOSIS — Z51.11 CHEMOTHERAPY MANAGEMENT, ENCOUNTER FOR: ICD-10-CM

## 2019-01-29 DIAGNOSIS — D89.0 POLYCLONAL GAMMOPATHY: ICD-10-CM

## 2019-01-29 DIAGNOSIS — D50.8 OTHER IRON DEFICIENCY ANEMIA: ICD-10-CM

## 2019-01-29 DIAGNOSIS — D50.9 IRON DEFICIENCY ANEMIA: ICD-10-CM

## 2019-01-29 DIAGNOSIS — C56.9 MALIGNANT NEOPLASM OF OVARY, UNSPECIFIED LATERALITY (HCC): Primary | ICD-10-CM

## 2019-01-29 LAB
ALBUMIN SERPL BCP-MCNC: 3.5 G/DL (ref 3.5–4.8)
ALBUMIN/GLOB SERPL: 1.1 {RATIO} (ref 1–2)
ALP SERPL-CCNC: 86 U/L (ref 32–100)
ALT SERPL-CCNC: 18 U/L (ref 14–54)
ANION GAP SERPL CALC-SCNC: 9 MMOL/L (ref 0–18)
AST SERPL-CCNC: 18 U/L (ref 15–41)
BASOPHILS # BLD AUTO: 0.03 X10(3) UL (ref 0–0.2)
BASOPHILS NFR BLD AUTO: 0.6 %
BILIRUB SERPL-MCNC: 0.6 MG/DL (ref 0.3–1.2)
BUN SERPL-MCNC: 20 MG/DL (ref 8–20)
BUN/CREAT SERPL: 22.2 (ref 10–20)
CALCIUM SERPL-MCNC: 8 MG/DL (ref 8.5–10.5)
CHLORIDE SERPL-SCNC: 107 MMOL/L (ref 95–110)
CO2 SERPL-SCNC: 20 MMOL/L (ref 22–32)
CREAT SERPL-MCNC: 0.9 MG/DL (ref 0.5–1.5)
DEPRECATED RDW RBC AUTO: 51 FL (ref 35.1–46.3)
EOSINOPHIL # BLD AUTO: 0.17 X10(3) UL (ref 0–0.7)
EOSINOPHIL NFR BLD AUTO: 3.6 %
ERYTHROCYTE [DISTWIDTH] IN BLOOD BY AUTOMATED COUNT: 15.5 % (ref 11–15)
GLOBULIN PLAS-MCNC: 3.1 G/DL (ref 2.5–3.7)
GLUCOSE SERPL-MCNC: 99 MG/DL (ref 70–99)
HCT VFR BLD AUTO: 24.9 % (ref 35–48)
HGB BLD-MCNC: 8.2 G/DL (ref 12–16)
IMM GRANULOCYTES # BLD AUTO: 0.02 X10(3) UL (ref 0–1)
IMM GRANULOCYTES NFR BLD: 0.4 %
IRON SATN MFR SERPL: 42 % (ref 15–50)
IRON SERPL-MCNC: 104 MCG/DL (ref 28–170)
LYMPHOCYTES # BLD AUTO: 2.35 X10(3) UL (ref 1–4)
LYMPHOCYTES NFR BLD AUTO: 49.5 %
MCH RBC QN AUTO: 30.4 PG (ref 26–34)
MCHC RBC AUTO-ENTMCNC: 32.9 G/DL (ref 31–37)
MCV RBC AUTO: 92.2 FL (ref 80–100)
MONOCYTES # BLD AUTO: 0.35 X10(3) UL (ref 0.1–1)
MONOCYTES NFR BLD AUTO: 7.4 %
NEUTROPHILS # BLD AUTO: 1.83 X10 (3) UL (ref 1.5–7.7)
NEUTROPHILS # BLD AUTO: 1.83 X10(3) UL (ref 1.5–7.7)
NEUTROPHILS NFR BLD AUTO: 38.5 %
OSMOLALITY UR CALC.SUM OF ELEC: 285 MOSM/KG (ref 275–295)
PATIENT FASTING: NO
PLATELET # BLD AUTO: 65 10(3)UL (ref 150–450)
POTASSIUM SERPL-SCNC: 3.5 MMOL/L (ref 3.3–5.1)
PROT SERPL-MCNC: 6.6 G/DL (ref 5.9–8.4)
RBC # BLD AUTO: 2.7 X10(6)UL (ref 3.8–5.3)
SODIUM SERPL-SCNC: 136 MMOL/L (ref 136–144)
TIBC SERPL-MCNC: 249 MCG/DL (ref 228–428)
TRANSFERRIN SERPL-MCNC: 189 MG/DL (ref 192–382)
WBC # BLD AUTO: 4.8 X10(3) UL (ref 4–11)

## 2019-01-29 PROCEDURE — 85025 COMPLETE CBC W/AUTO DIFF WBC: CPT

## 2019-01-29 PROCEDURE — 99215 OFFICE O/P EST HI 40 MIN: CPT | Performed by: INTERNAL MEDICINE

## 2019-01-29 PROCEDURE — 86304 IMMUNOASSAY TUMOR CA 125: CPT

## 2019-01-29 PROCEDURE — 80053 COMPREHEN METABOLIC PANEL: CPT

## 2019-01-29 PROCEDURE — 36415 COLL VENOUS BLD VENIPUNCTURE: CPT

## 2019-01-29 PROCEDURE — 84466 ASSAY OF TRANSFERRIN: CPT

## 2019-01-29 PROCEDURE — 83540 ASSAY OF IRON: CPT

## 2019-01-29 NOTE — PROGRESS NOTES
Cancer Center Progress Note    Patient Name: Benita Isbell   YOB: 1940   Medical Record Number: [id number]   Attending Physician: Lavelle Leal M.D. Chief Complaint:  polyclonal gammopathy anemia ureter cancer.   rectal cancer    Histo high-grade. There is recommended by gynecologic oncology at [facility] to proceed with 6 cycles of single agent carboplatin AUC 6      Interval history:  Returns for follow-up on chemotherapy.   She is interested in havingf further cycles completed here at [facility] Asked        Blood Transfusions: Not Asked        Caffeine Concern: No        Occupational Exposure: Not Asked        Hobby Hazards: Not Asked        Sleep Concern: Not Asked        Stress Concern: Not Asked        Weight Concern: Not Asked        Special hydrochlorothiazide (HYDRODIURIL) 12.5 MG Oral Tab, Take 12.5 mg by mouth daily.   , Disp: , Rfl:     Allergies:  No Known Allergies     Review of Systems:  All other systems reviewed and negative x12    Vital Signs:  /65 (BP Location: Left arm, Patie deficiency. We will set her up for IV iron  –Rectal adenocarcinoma as outlined above. No evidence of disease beyond T1 on MR.   We can consider transanal treatment however her ovarian/pelvic mass takes precedent  –Ovarian/pelvic mass measuring almost 15 c

## 2019-01-30 LAB
CANCER AG125 SERPL-ACNC: 7.4 U/ML (ref ?–35)
CANCER AG125 SERPL-ACNC: 8 U/ML (ref 0–35)

## 2019-02-01 ENCOUNTER — TELEPHONE (OUTPATIENT)
Dept: HEMATOLOGY/ONCOLOGY | Facility: HOSPITAL | Age: 79
End: 2019-02-01

## 2019-02-01 NOTE — TELEPHONE ENCOUNTER
Diana Dunn called regarding his mothers upcoming colonoscopy. She has been coming in for treatment and he was trying to get some information because she hasn't been good at relaying how the visits have been going.  Please advise [phone]

## 2019-02-05 ENCOUNTER — TELEPHONE (OUTPATIENT)
Dept: HEMATOLOGY/ONCOLOGY | Facility: HOSPITAL | Age: 79
End: 2019-02-05

## 2019-02-05 NOTE — TELEPHONE ENCOUNTER
At visit last week, pt indicated to Dr Wanda Joshi that she is interested in completed her chemo here, no [facility]. I called her today to let her know that her chemo has been authorized, and to inquire to her plans.   She states \"I saw Dr Alpha Alpers again, and I've de

## 2019-02-28 VITALS
HEIGHT: 63 IN | HEART RATE: 93 BPM | WEIGHT: 151 LBS | BODY MASS INDEX: 26.75 KG/M2 | SYSTOLIC BLOOD PRESSURE: 120 MMHG | DIASTOLIC BLOOD PRESSURE: 68 MMHG | OXYGEN SATURATION: 95 %

## 2019-02-28 VITALS
WEIGHT: 149 LBS | DIASTOLIC BLOOD PRESSURE: 64 MMHG | BODY MASS INDEX: 27.42 KG/M2 | HEART RATE: 80 BPM | SYSTOLIC BLOOD PRESSURE: 120 MMHG | HEIGHT: 62 IN | OXYGEN SATURATION: 98 %

## 2019-04-04 RX ORDER — METOPROLOL SUCCINATE 50 MG/1
TABLET, EXTENDED RELEASE ORAL
COMMUNITY
Start: 2018-06-28 | End: 2021-04-22

## 2019-04-04 RX ORDER — LEVOTHYROXINE SODIUM 137 UG/1
137 TABLET ORAL
COMMUNITY
Start: 2018-06-28

## 2019-04-04 RX ORDER — ALPRAZOLAM 1 MG/1
TABLET ORAL
COMMUNITY
Start: 2018-06-27 | End: 2021-04-22

## 2019-04-04 RX ORDER — LOSARTAN POTASSIUM 100 MG/1
TABLET ORAL
COMMUNITY
Start: 2018-06-27 | End: 2021-04-22

## 2019-04-04 RX ORDER — POTASSIUM CHLORIDE 750 MG/1
TABLET, FILM COATED, EXTENDED RELEASE ORAL
COMMUNITY
End: 2021-04-22

## 2019-04-04 RX ORDER — HYDROCHLOROTHIAZIDE 25 MG/1
25 TABLET ORAL DAILY
COMMUNITY
Start: 2018-06-27

## 2019-04-04 RX ORDER — SERTRALINE HYDROCHLORIDE 100 MG/1
100 TABLET, FILM COATED ORAL DAILY
COMMUNITY
Start: 2018-06-27

## 2019-04-04 RX ORDER — SIMVASTATIN 20 MG
TABLET ORAL
COMMUNITY
Start: 2018-06-27

## 2019-04-04 RX ORDER — RANITIDINE 300 MG/1
TABLET ORAL
COMMUNITY
End: 2021-04-22

## 2019-04-04 RX ORDER — PANTOPRAZOLE SODIUM 40 MG/1
TABLET, DELAYED RELEASE ORAL
COMMUNITY
Start: 2018-06-27 | End: 2021-04-22

## 2019-06-25 PROBLEM — E78.2 MIXED HYPERLIPIDEMIA: Status: ACTIVE | Noted: 2019-06-25

## 2019-06-25 PROBLEM — I10 HYPERTENSION: Status: ACTIVE | Noted: 2019-06-25

## 2019-06-25 PROBLEM — I49.1 PAC (PREMATURE ATRIAL CONTRACTION): Status: ACTIVE | Noted: 2019-06-25

## 2019-07-15 ENCOUNTER — APPOINTMENT (OUTPATIENT)
Dept: CARDIOLOGY | Age: 79
End: 2019-07-15

## 2019-08-01 ENCOUNTER — APPOINTMENT (OUTPATIENT)
Dept: RADIATION ONCOLOGY | Facility: HOSPITAL | Age: 79
End: 2019-08-01
Attending: RADIOLOGY
Payer: MEDICARE

## 2019-08-13 ENCOUNTER — HOSPITAL ENCOUNTER (INPATIENT)
Facility: HOSPITAL | Age: 79
LOS: 3 days | Discharge: HOME HEALTH | DRG: 907 | End: 2019-08-18
Attending: EMERGENCY MEDICINE | Admitting: INTERNAL MEDICINE
Payer: MEDICARE

## 2019-08-13 DIAGNOSIS — D64.9 ANEMIA, UNSPECIFIED TYPE: ICD-10-CM

## 2019-08-13 DIAGNOSIS — K92.2 GASTROINTESTINAL HEMORRHAGE, UNSPECIFIED GASTROINTESTINAL HEMORRHAGE TYPE: Primary | ICD-10-CM

## 2019-08-13 LAB
ANION GAP SERPL CALC-SCNC: 8 MMOL/L (ref 0–18)
ANTIBODY SCREEN: NEGATIVE
BASOPHILS # BLD AUTO: 0.03 X10(3) UL (ref 0–0.2)
BASOPHILS NFR BLD AUTO: 0.4 %
BUN BLD-MCNC: 30 MG/DL (ref 7–18)
BUN/CREAT SERPL: 26.5 (ref 10–20)
CALCIUM BLD-MCNC: 8.1 MG/DL (ref 8.5–10.1)
CHLORIDE SERPL-SCNC: 112 MMOL/L (ref 98–112)
CO2 SERPL-SCNC: 25 MMOL/L (ref 21–32)
CREAT BLD-MCNC: 1.13 MG/DL (ref 0.55–1.02)
DEPRECATED RDW RBC AUTO: 46.7 FL (ref 35.1–46.3)
EOSINOPHIL # BLD AUTO: 0.27 X10(3) UL (ref 0–0.7)
EOSINOPHIL NFR BLD AUTO: 3.5 %
ERYTHROCYTE [DISTWIDTH] IN BLOOD BY AUTOMATED COUNT: 11.9 % (ref 11–15)
GLUCOSE BLD-MCNC: 144 MG/DL (ref 70–99)
HCT VFR BLD AUTO: 21.6 % (ref 35–48)
HCT VFR BLD AUTO: 23.1 % (ref 35–48)
HGB BLD-MCNC: 6.7 G/DL (ref 12–16)
HGB BLD-MCNC: 7.1 G/DL (ref 12–16)
HGB BLD-MCNC: 7.7 G/DL (ref 12–16)
IMM GRANULOCYTES # BLD AUTO: 0.05 X10(3) UL (ref 0–1)
IMM GRANULOCYTES NFR BLD: 0.6 %
LYMPHOCYTES # BLD AUTO: 2.5 X10(3) UL (ref 1–4)
LYMPHOCYTES NFR BLD AUTO: 32.1 %
MCH RBC QN AUTO: 36.2 PG (ref 26–34)
MCHC RBC AUTO-ENTMCNC: 33.3 G/DL (ref 31–37)
MCV RBC AUTO: 108.5 FL (ref 80–100)
MONOCYTES # BLD AUTO: 0.7 X10(3) UL (ref 0.1–1)
MONOCYTES NFR BLD AUTO: 9 %
NEUTROPHILS # BLD AUTO: 4.24 X10 (3) UL (ref 1.5–7.7)
NEUTROPHILS # BLD AUTO: 4.24 X10(3) UL (ref 1.5–7.7)
NEUTROPHILS NFR BLD AUTO: 54.4 %
OSMOLALITY SERPL CALC.SUM OF ELEC: 309 MOSM/KG (ref 275–295)
PLATELET # BLD AUTO: 185 10(3)UL (ref 150–450)
POTASSIUM SERPL-SCNC: 3.2 MMOL/L (ref 3.5–5.1)
RBC # BLD AUTO: 2.13 X10(6)UL (ref 3.8–5.3)
RH BLOOD TYPE: POSITIVE
SODIUM SERPL-SCNC: 145 MMOL/L (ref 136–145)
WBC # BLD AUTO: 7.8 X10(3) UL (ref 4–11)

## 2019-08-13 PROCEDURE — 30233N1 TRANSFUSION OF NONAUTOLOGOUS RED BLOOD CELLS INTO PERIPHERAL VEIN, PERCUTANEOUS APPROACH: ICD-10-PCS | Performed by: INTERNAL MEDICINE

## 2019-08-13 PROCEDURE — 99215 OFFICE O/P EST HI 40 MIN: CPT | Performed by: INTERNAL MEDICINE

## 2019-08-13 RX ORDER — POTASSIUM CHLORIDE 1500 MG/1
1 TABLET, FILM COATED, EXTENDED RELEASE ORAL 2 TIMES DAILY
Status: ON HOLD | COMMUNITY
End: 2019-08-18

## 2019-08-13 RX ORDER — METOCLOPRAMIDE HYDROCHLORIDE 5 MG/ML
10 INJECTION INTRAMUSCULAR; INTRAVENOUS EVERY 8 HOURS PRN
Status: DISCONTINUED | OUTPATIENT
Start: 2019-08-13 | End: 2019-08-18

## 2019-08-13 RX ORDER — SODIUM CHLORIDE 0.9 % (FLUSH) 0.9 %
10 SYRINGE (ML) INJECTION AS NEEDED
Status: DISCONTINUED | OUTPATIENT
Start: 2019-08-13 | End: 2019-08-18

## 2019-08-13 RX ORDER — POTASSIUM CHLORIDE 750 MG/1
10 TABLET, EXTENDED RELEASE ORAL 2 TIMES DAILY
COMMUNITY
End: 2019-10-08

## 2019-08-13 RX ORDER — ATENOLOL 50 MG/1
50 TABLET ORAL DAILY
Status: DISCONTINUED | OUTPATIENT
Start: 2019-08-13 | End: 2019-08-18

## 2019-08-13 RX ORDER — LOPERAMIDE HYDROCHLORIDE 2 MG/1
2 CAPSULE ORAL 4 TIMES DAILY PRN
Status: DISCONTINUED | OUTPATIENT
Start: 2019-08-13 | End: 2019-08-18

## 2019-08-13 RX ORDER — CYANOCOBALAMIN 1000 UG/ML
1000 INJECTION INTRAMUSCULAR; SUBCUTANEOUS ONCE
Status: COMPLETED | OUTPATIENT
Start: 2019-08-13 | End: 2019-08-13

## 2019-08-13 RX ORDER — RANITIDINE 300 MG/1
300 CAPSULE ORAL NIGHTLY
Status: DISCONTINUED | OUTPATIENT
Start: 2019-08-13 | End: 2019-08-13

## 2019-08-13 RX ORDER — ACETAMINOPHEN 325 MG/1
650 TABLET ORAL EVERY 6 HOURS PRN
Status: DISCONTINUED | OUTPATIENT
Start: 2019-08-13 | End: 2019-08-18

## 2019-08-13 RX ORDER — DIPHENHYDRAMINE HCL 25 MG
25 CAPSULE ORAL ONCE
Status: COMPLETED | OUTPATIENT
Start: 2019-08-13 | End: 2019-08-13

## 2019-08-13 RX ORDER — ONDANSETRON 8 MG/1
8 TABLET, ORALLY DISINTEGRATING ORAL EVERY 8 HOURS PRN
COMMUNITY

## 2019-08-13 RX ORDER — POTASSIUM CHLORIDE 750 MG/1
10 TABLET, FILM COATED, EXTENDED RELEASE ORAL
Status: ON HOLD | COMMUNITY
End: 2019-08-18

## 2019-08-13 RX ORDER — POTASSIUM CHLORIDE 750 MG/1
10 TABLET, EXTENDED RELEASE ORAL 2 TIMES DAILY
Status: DISCONTINUED | OUTPATIENT
Start: 2019-08-13 | End: 2019-08-18

## 2019-08-13 RX ORDER — LORAZEPAM 1 MG/1
1 TABLET ORAL EVERY 8 HOURS PRN
Status: ON HOLD | COMMUNITY
End: 2020-02-18

## 2019-08-13 RX ORDER — SODIUM CHLORIDE 0.9 % (FLUSH) 0.9 %
3 SYRINGE (ML) INJECTION AS NEEDED
Status: DISCONTINUED | OUTPATIENT
Start: 2019-08-13 | End: 2019-08-18

## 2019-08-13 RX ORDER — LOSARTAN POTASSIUM 100 MG/1
100 TABLET ORAL DAILY
Status: DISCONTINUED | OUTPATIENT
Start: 2019-08-13 | End: 2019-08-18

## 2019-08-13 RX ORDER — SODIUM CHLORIDE 9 MG/ML
INJECTION, SOLUTION INTRAVENOUS ONCE
Status: COMPLETED | OUTPATIENT
Start: 2019-08-13 | End: 2019-08-14

## 2019-08-13 RX ORDER — ONDANSETRON 2 MG/ML
4 INJECTION INTRAMUSCULAR; INTRAVENOUS EVERY 6 HOURS PRN
Status: DISCONTINUED | OUTPATIENT
Start: 2019-08-13 | End: 2019-08-18

## 2019-08-13 RX ORDER — IBUPROFEN 600 MG/1
600 TABLET ORAL EVERY 6 HOURS PRN
Status: ON HOLD | COMMUNITY
End: 2019-08-18

## 2019-08-13 RX ORDER — POTASSIUM CHLORIDE 20 MEQ/1
40 TABLET, EXTENDED RELEASE ORAL EVERY 4 HOURS
Status: COMPLETED | OUTPATIENT
Start: 2019-08-13 | End: 2019-08-14

## 2019-08-13 RX ORDER — ALPRAZOLAM 0.25 MG/1
0.25 TABLET ORAL 4 TIMES DAILY PRN
Status: DISCONTINUED | OUTPATIENT
Start: 2019-08-13 | End: 2019-08-18

## 2019-08-13 RX ORDER — ACETAMINOPHEN 325 MG/1
650 TABLET ORAL EVERY 4 HOURS PRN
Status: ON HOLD | COMMUNITY
End: 2019-08-18

## 2019-08-13 RX ORDER — ACETAMINOPHEN 325 MG/1
650 TABLET ORAL ONCE
Status: COMPLETED | OUTPATIENT
Start: 2019-08-13 | End: 2019-08-13

## 2019-08-13 RX ORDER — PANTOPRAZOLE SODIUM 40 MG/1
40 TABLET, DELAYED RELEASE ORAL
Status: DISCONTINUED | OUTPATIENT
Start: 2019-08-13 | End: 2019-08-18

## 2019-08-13 NOTE — ED PROVIDER NOTES
Patient Seen in: [facility] Emergency Department    History   Patient presents with:  GI Bleeding (gastrointestinal)    Stated Complaint: blood in toilet    HPI    History is provided by EMS.     35-year-old female with history of rectal cancer, di frequency. Musculoskeletal: Negative for back pain. Skin: Negative for rash. Neurological: Negative for weakness, light-headedness and headaches. All other systems reviewed and are negative.       Positive for stated complaint: blood in toilet  Othe oxygenation.     PROCEDURES:  none    DIAGNOSTICS:   Labs:  Recent Results (from the past 24 hour(s))   BASIC METABOLIC PANEL (8)    Collection Time: 08/13/19  3:41 AM   Result Value Ref Range    Glucose 144 (H) 70 - 99 mg/dL    Sodium 145 136 - 145 mmol/L condition was stable during Emergency Department evaluation.      78yoF with GI bleed  - I personally reviewed and interpreted all the ED vitals  - afebrile, hemodynamically stable  - I ordered and personally reviewed the labs and imaging and found no leuko

## 2019-08-13 NOTE — H&P
[facility]    PATIENT'S NAME: Lucretia NINA   ATTENDING PHYSICIAN: Brett Barraza MD   PATIENT ACCOUNT#:   [id number]    LOCATION:   [address] RECORD #:   [id number]       YOB: 1940  ADMISSION DATE:       08/13/20 arthroplasty, she also had robotic rectal resection on 07/22/2019.     MEDICATIONS:  Potassium chloride 10 mEq twice a day, iron sulfate 325 a day, alprazolam 1 tablet q.i.d. p.r.n., Zantac 300 mg at bedtime, atenolol 50 mg a day, levothyroxine 137 mcg a da History of endometrial carcinoma with resection 12/2018.  5.   Remote history of bladder and left ureteral carcinoma 2005. 6.   Anxiety and depression.     PLAN:  The patient has been admitted to observation, and GI and hematology consultations have been

## 2019-08-13 NOTE — ED NOTES
Orders for admission, patient is aware of plan and ready to go upstairs. Any questions, please call ED TOAN castillo at extension [phone]  Pt is AOx4, from home, independent with ADL, continent of bowel and bladder.  Pt had suspicious polyps removed from colon 2

## 2019-08-13 NOTE — ED INITIAL ASSESSMENT (HPI)
Pt reports she had a colonoscopy 2 weeks ago in which the DR removed possible cancerous spots. This morning pt was having a BM and noticed bright red blood and clots in the toilet. Pt is not on blood thinners.

## 2019-08-13 NOTE — PROGRESS NOTES
Pharmacy note: Duplicate Proton Pump Inhibitor with Histamine 2 blocker. Bg Cuevas is a 66year old female who has been prescribed both Famotidine and Protonix.   Pepcid was discontinued per [employer] approved protocol for duplicate therapy in adult pa

## 2019-08-13 NOTE — CONSULTS
Inpatient hematology oncology consultation    Requesting physician Dr. Maida Guerrero    Reason for evaluation  Rectal cancer ovarian cancer anemia from GI blood loss    History of Present Illness:   42-year-old female with a history of non-muscle invasive gammopathy of unknown significance)    • Pernicious anemia    • Type II or unspecified type diabetes mellitus without mention of complication, not stated as uncontrolled    • Unspecified essential hypertension        Past Surgical History:  Past Surgical H Transfusions: Not Asked        Caffeine Concern: No        Occupational Exposure: Not Asked        Hobby Hazards: Not Asked        Sleep Concern: Not Asked        Stress Concern: Not Asked        Weight Concern: Not Asked        Special Diet: Not Asked  08/13/2019    K 3.2 (L) 08/13/2019     08/13/2019    CO2 25.0 08/13/2019       Radiology:  none    Cancer Staging  No matching staging information was found for the patient.     Impression and Plan:  59-year-old female with ovarian cancer statu

## 2019-08-13 NOTE — CONSULTS
[facility] HOSP - [facility]    Report of Consultation    Yamileth Purcell Patient Status:  Observation    1940 MRN [id number]   Location [facility]5W Attending Raffi Kelly MD   Hosp Day # 0 PCP Elmira Colin MD     Date of Admission: atenolol (TENORMIN) tab 50 mg 50 mg Oral Daily   Nepafenac 0.3 % SUSP 137 mg 137 mg Oral Daily   Iron polysacch [id number] (NIFEREX FORTE) 150-0.025-1 MG cap CAPS 1 capsule 1 capsule Oral Daily   levothyroxine (SYNTHROID, LEVOTHROID) tab 137 mcg Oral B RaNITidine HCl 300 MG Oral Cap TAKE ONE CAPSULE BY MOUTH AT BEDTIME   atenolol 50 MG Oral Tab Take 50 mg by mouth daily.      Levothyroxine Sodium 137 MCG Oral Tab Take 137 mcg by mouth before breakfast.     Loperamide HCl 2 MG Oral Cap Take 2 mg by mouth lesions  HEENT: atraumatic, normocephalic, oropharynx clear  NECK: supple,no adenopathy, no masses  LUNGS: clear to auscultation  CARDIO: RRR without murmur  GI: normal active BS, no tenderness on palpation  Rectal: no blood or stool noted  EXTREMITIES: no

## 2019-08-14 ENCOUNTER — APPOINTMENT (OUTPATIENT)
Dept: INTERVENTIONAL RADIOLOGY/VASCULAR | Facility: HOSPITAL | Age: 79
DRG: 907 | End: 2019-08-14
Attending: RADIOLOGY
Payer: MEDICARE

## 2019-08-14 ENCOUNTER — ANESTHESIA EVENT (OUTPATIENT)
Dept: ENDOSCOPY | Facility: HOSPITAL | Age: 79
DRG: 907 | End: 2019-08-14
Payer: MEDICARE

## 2019-08-14 ENCOUNTER — ANESTHESIA (OUTPATIENT)
Dept: ENDOSCOPY | Facility: HOSPITAL | Age: 79
DRG: 907 | End: 2019-08-14
Payer: MEDICARE

## 2019-08-14 ENCOUNTER — APPOINTMENT (OUTPATIENT)
Dept: GENERAL RADIOLOGY | Facility: HOSPITAL | Age: 79
DRG: 907 | End: 2019-08-14
Attending: INTERNAL MEDICINE
Payer: MEDICARE

## 2019-08-14 LAB
ANION GAP SERPL CALC-SCNC: 5 MMOL/L (ref 0–18)
ANION GAP SERPL CALC-SCNC: 8 MMOL/L (ref 0–18)
APTT PPP: 25 SECONDS (ref 23.2–35.3)
BASOPHILS # BLD AUTO: 0.03 X10(3) UL (ref 0–0.2)
BASOPHILS # BLD AUTO: 0.04 X10(3) UL (ref 0–0.2)
BASOPHILS NFR BLD AUTO: 0.5 %
BASOPHILS NFR BLD AUTO: 0.5 %
BUN BLD-MCNC: 17 MG/DL (ref 7–18)
BUN BLD-MCNC: 19 MG/DL (ref 7–18)
BUN/CREAT SERPL: 19 (ref 10–20)
BUN/CREAT SERPL: 19.8 (ref 10–20)
C DIFF TOX B STL QL: NEGATIVE
CALCIUM BLD-MCNC: 8.4 MG/DL (ref 8.5–10.1)
CALCIUM BLD-MCNC: 8.7 MG/DL (ref 8.5–10.1)
CHLORIDE SERPL-SCNC: 116 MMOL/L (ref 98–112)
CHLORIDE SERPL-SCNC: 117 MMOL/L (ref 98–112)
CO2 SERPL-SCNC: 22 MMOL/L (ref 21–32)
CO2 SERPL-SCNC: 24 MMOL/L (ref 21–32)
CREAT BLD-MCNC: 0.86 MG/DL (ref 0.55–1.02)
CREAT BLD-MCNC: 1 MG/DL (ref 0.55–1.02)
DEPRECATED RDW RBC AUTO: 66.2 FL (ref 35.1–46.3)
DEPRECATED RDW RBC AUTO: 70.5 FL (ref 35.1–46.3)
EOSINOPHIL # BLD AUTO: 0.24 X10(3) UL (ref 0–0.7)
EOSINOPHIL # BLD AUTO: 0.29 X10(3) UL (ref 0–0.7)
EOSINOPHIL NFR BLD AUTO: 2.7 %
EOSINOPHIL NFR BLD AUTO: 5.1 %
ERYTHROCYTE [DISTWIDTH] IN BLOOD BY AUTOMATED COUNT: 19.2 % (ref 11–15)
ERYTHROCYTE [DISTWIDTH] IN BLOOD BY AUTOMATED COUNT: 19.9 % (ref 11–15)
GLUCOSE BLD-MCNC: 178 MG/DL (ref 70–99)
GLUCOSE BLD-MCNC: 94 MG/DL (ref 70–99)
GLUCOSE BLDC GLUCOMTR-MCNC: 182 MG/DL (ref 70–99)
HCT VFR BLD AUTO: 25.9 % (ref 35–48)
HCT VFR BLD AUTO: 26.3 % (ref 35–48)
HCT VFR BLD AUTO: 26.8 % (ref 35–48)
HGB BLD-MCNC: 8.7 G/DL (ref 12–16)
HGB BLD-MCNC: 9 G/DL (ref 12–16)
HGB BLD-MCNC: 9.2 G/DL (ref 12–16)
IMM GRANULOCYTES # BLD AUTO: 0.02 X10(3) UL (ref 0–1)
IMM GRANULOCYTES # BLD AUTO: 0.04 X10(3) UL (ref 0–1)
IMM GRANULOCYTES NFR BLD: 0.3 %
IMM GRANULOCYTES NFR BLD: 0.5 %
INR BLD: 1.14 (ref 0.9–1.2)
IRON SATURATION: 93 % (ref 15–50)
IRON SERPL-MCNC: 211 UG/DL (ref 50–170)
LYMPHOCYTES # BLD AUTO: 2.22 X10(3) UL (ref 1–4)
LYMPHOCYTES # BLD AUTO: 2.97 X10(3) UL (ref 1–4)
LYMPHOCYTES NFR BLD AUTO: 33.6 %
LYMPHOCYTES NFR BLD AUTO: 38.7 %
MCH RBC QN AUTO: 33.5 PG (ref 26–34)
MCH RBC QN AUTO: 33.7 PG (ref 26–34)
MCHC RBC AUTO-ENTMCNC: 33.6 G/DL (ref 31–37)
MCHC RBC AUTO-ENTMCNC: 34.3 G/DL (ref 31–37)
MCV RBC AUTO: 100.4 FL (ref 80–100)
MCV RBC AUTO: 97.5 FL (ref 80–100)
MONOCYTES # BLD AUTO: 0.53 X10(3) UL (ref 0.1–1)
MONOCYTES # BLD AUTO: 0.72 X10(3) UL (ref 0.1–1)
MONOCYTES NFR BLD AUTO: 8.2 %
MONOCYTES NFR BLD AUTO: 9.2 %
NEUTROPHILS # BLD AUTO: 2.64 X10 (3) UL (ref 1.5–7.7)
NEUTROPHILS # BLD AUTO: 2.64 X10(3) UL (ref 1.5–7.7)
NEUTROPHILS # BLD AUTO: 4.82 X10 (3) UL (ref 1.5–7.7)
NEUTROPHILS # BLD AUTO: 4.82 X10(3) UL (ref 1.5–7.7)
NEUTROPHILS NFR BLD AUTO: 46.2 %
NEUTROPHILS NFR BLD AUTO: 54.5 %
OSMOLALITY SERPL CALC.SUM OF ELEC: 303 MOSM/KG (ref 275–295)
OSMOLALITY SERPL CALC.SUM OF ELEC: 309 MOSM/KG (ref 275–295)
PATIENT FASTING: NO
PATIENT FASTING: NO
PLATELET # BLD AUTO: 139 10(3)UL (ref 150–450)
PLATELET # BLD AUTO: 161 10(3)UL (ref 150–450)
PLATELET MORPHOLOGY: NORMAL
PLATELET MORPHOLOGY: NORMAL
POTASSIUM SERPL-SCNC: 4.4 MMOL/L (ref 3.5–5.1)
POTASSIUM SERPL-SCNC: 4.8 MMOL/L (ref 3.5–5.1)
POTASSIUM SERPL-SCNC: 4.8 MMOL/L (ref 3.5–5.1)
PROTHROMBIN TIME: 14.5 SECONDS (ref 11.8–14.5)
RBC # BLD AUTO: 2.58 X10(6)UL (ref 3.8–5.3)
RBC # BLD AUTO: 2.75 X10(6)UL (ref 3.8–5.3)
SODIUM SERPL-SCNC: 146 MMOL/L (ref 136–145)
SODIUM SERPL-SCNC: 146 MMOL/L (ref 136–145)
TOTAL IRON BINDING CAPACITY: 228 UG/DL (ref 240–450)
TRANSFERRIN SERPL-MCNC: 153 MG/DL (ref 200–360)
WBC # BLD AUTO: 5.7 X10(3) UL (ref 4–11)
WBC # BLD AUTO: 8.8 X10(3) UL (ref 4–11)

## 2019-08-14 PROCEDURE — 99291 CRITICAL CARE FIRST HOUR: CPT | Performed by: HOSPITALIST

## 2019-08-14 PROCEDURE — 99223 1ST HOSP IP/OBS HIGH 75: CPT | Performed by: INTERNAL MEDICINE

## 2019-08-14 PROCEDURE — 5A1935Z RESPIRATORY VENTILATION, LESS THAN 24 CONSECUTIVE HOURS: ICD-10-PCS | Performed by: INTERNAL MEDICINE

## 2019-08-14 PROCEDURE — 30233H1 TRANSFUSION OF NONAUTOLOGOUS WHOLE BLOOD INTO PERIPHERAL VEIN, PERCUTANEOUS APPROACH: ICD-10-PCS | Performed by: INTERNAL MEDICINE

## 2019-08-14 PROCEDURE — 06H033Z INSERTION OF INFUSION DEVICE INTO INFERIOR VENA CAVA, PERCUTANEOUS APPROACH: ICD-10-PCS | Performed by: RADIOLOGY

## 2019-08-14 PROCEDURE — 3E0H8GC INTRODUCTION OF OTHER THERAPEUTIC SUBSTANCE INTO LOWER GI, VIA NATURAL OR ARTIFICIAL OPENING ENDOSCOPIC: ICD-10-PCS | Performed by: INTERNAL MEDICINE

## 2019-08-14 PROCEDURE — 0DJD8ZZ INSPECTION OF LOWER INTESTINAL TRACT, VIA NATURAL OR ARTIFICIAL OPENING ENDOSCOPIC: ICD-10-PCS | Performed by: INTERNAL MEDICINE

## 2019-08-14 PROCEDURE — 04LB3DZ OCCLUSION OF INFERIOR MESENTERIC ARTERY WITH INTRALUMINAL DEVICE, PERCUTANEOUS APPROACH: ICD-10-PCS | Performed by: RADIOLOGY

## 2019-08-14 PROCEDURE — 71045 X-RAY EXAM CHEST 1 VIEW: CPT | Performed by: INTERNAL MEDICINE

## 2019-08-14 PROCEDURE — 0W3P8ZZ CONTROL BLEEDING IN GASTROINTESTINAL TRACT, VIA NATURAL OR ARTIFICIAL OPENING ENDOSCOPIC: ICD-10-PCS | Performed by: INTERNAL MEDICINE

## 2019-08-14 PROCEDURE — 99232 SBSQ HOSP IP/OBS MODERATE 35: CPT | Performed by: INTERNAL MEDICINE

## 2019-08-14 PROCEDURE — B4101ZZ FLUOROSCOPY OF ABDOMINAL AORTA USING LOW OSMOLAR CONTRAST: ICD-10-PCS | Performed by: RADIOLOGY

## 2019-08-14 RX ORDER — PHENYLEPHRINE HCL 10 MG/ML
VIAL (ML) INJECTION AS NEEDED
Status: DISCONTINUED | OUTPATIENT
Start: 2019-08-14 | End: 2019-08-14 | Stop reason: SURG

## 2019-08-14 RX ORDER — MIDAZOLAM HYDROCHLORIDE 1 MG/ML
INJECTION INTRAMUSCULAR; INTRAVENOUS AS NEEDED
Status: DISCONTINUED | OUTPATIENT
Start: 2019-08-14 | End: 2019-08-14 | Stop reason: SURG

## 2019-08-14 RX ORDER — SODIUM CHLORIDE 9 MG/ML
INJECTION, SOLUTION INTRAVENOUS
Status: COMPLETED
Start: 2019-08-14 | End: 2019-08-14

## 2019-08-14 RX ORDER — MIDAZOLAM HYDROCHLORIDE 1 MG/ML
INJECTION INTRAMUSCULAR; INTRAVENOUS
Status: COMPLETED
Start: 2019-08-14 | End: 2019-08-14

## 2019-08-14 RX ORDER — SODIUM CHLORIDE 9 MG/ML
INJECTION, SOLUTION INTRAVENOUS CONTINUOUS PRN
Status: DISCONTINUED | OUTPATIENT
Start: 2019-08-14 | End: 2019-08-14 | Stop reason: SURG

## 2019-08-14 RX ORDER — SODIUM CHLORIDE, SODIUM LACTATE, POTASSIUM CHLORIDE, CALCIUM CHLORIDE 600; 310; 30; 20 MG/100ML; MG/100ML; MG/100ML; MG/100ML
INJECTION, SOLUTION INTRAVENOUS CONTINUOUS PRN
Status: DISCONTINUED | OUTPATIENT
Start: 2019-08-14 | End: 2019-08-14

## 2019-08-14 RX ORDER — GLYCOPYRROLATE 0.2 MG/ML
INJECTION INTRAMUSCULAR; INTRAVENOUS AS NEEDED
Status: DISCONTINUED | OUTPATIENT
Start: 2019-08-14 | End: 2019-08-14 | Stop reason: SURG

## 2019-08-14 RX ORDER — SODIUM CHLORIDE 9 MG/ML
INJECTION, SOLUTION INTRAVENOUS CONTINUOUS
Status: DISCONTINUED | OUTPATIENT
Start: 2019-08-14 | End: 2019-08-18

## 2019-08-14 RX ORDER — LIDOCAINE HYDROCHLORIDE 20 MG/ML
INJECTION, SOLUTION EPIDURAL; INFILTRATION; INTRACAUDAL; PERINEURAL
Status: COMPLETED
Start: 2019-08-14 | End: 2019-08-14

## 2019-08-14 RX ADMIN — PHENYLEPHRINE HCL 100 MCG: 10 MG/ML VIAL (ML) INJECTION at 13:45:00

## 2019-08-14 RX ADMIN — GLYCOPYRROLATE 0.2 MG: 0.2 INJECTION INTRAMUSCULAR; INTRAVENOUS at 13:21:00

## 2019-08-14 RX ADMIN — SODIUM CHLORIDE: 9 INJECTION, SOLUTION INTRAVENOUS at 13:00:00

## 2019-08-14 RX ADMIN — SODIUM CHLORIDE: 9 INJECTION, SOLUTION INTRAVENOUS at 15:16:00

## 2019-08-14 RX ADMIN — PHENYLEPHRINE HCL 50 MCG: 10 MG/ML VIAL (ML) INJECTION at 13:39:00

## 2019-08-14 RX ADMIN — MIDAZOLAM HYDROCHLORIDE 2 MG: 1 INJECTION INTRAMUSCULAR; INTRAVENOUS at 13:28:00

## 2019-08-14 RX ADMIN — PHENYLEPHRINE HCL 50 MCG: 10 MG/ML VIAL (ML) INJECTION at 13:35:00

## 2019-08-14 NOTE — PROGRESS NOTES
Pt up to bathroom with urgency for bowel movement, shortly after sitting on rolling commode pt stating she feels light-headed and then had an unresponsive episode, approx 30 seconds long with eyes rolling back in head, RRT called, upon waking pt vomited, w

## 2019-08-14 NOTE — ANESTHESIA PREPROCEDURE EVALUATION
Anesthesia PreOp Note    HPI:     Ana Maria Lord is a 66year old female who presents for preoperative consultation requested by: Patrice Casanova MD    Date of Surgery: 8/13/2019 - 8/14/2019    Procedure(s):   FLEXIBLE SIGMOIDOSCOPY  Indication: Rectal b acetaminophen 325 MG Oral Tab Take 650 mg by mouth every 4 (four) hours as needed for Pain. Disp:  Rfl:     ibuprofen 600 MG Oral Tab Take 600 mg by mouth every 6 (six) hours as needed for Pain.  Disp:  Rfl:     Potassium Chloride ER (KLOR-CON 10) 10 MEQ mg by mouth daily. Disp:  Rfl:  Taking   ILEVRO 0.3 % Ophthalmic Suspension Take 137 mg by mouth daily.    Disp:  Rfl:  Taking       Current Facility-Administered Medications Ordered in Epic:  0.9% NaCl infusion  Intravenous Continuous Aparna Del Cid MD PRN Jay Murphy MD     acetaminophen (TYLENOL) tab 650 mg 650 mg Oral Q6H PRN Jay Murphy MD     ondansetron HCl [facility]) injection 4 mg 4 mg Intravenous Q6H PRN Jay Murphy MD     Metoclopramide HCl (REGLAN) injection 10 mg 10 mg Intravenous Q Topics      Concerns:         Service: Not Asked        Blood Transfusions: Not Asked        Caffeine Concern: No        Occupational Exposure: Not Asked        Hobby Hazards: Not Asked        Sleep Concern: Not Asked        Stress Concern: Not Ask General  Monitors and Lines:   A-line and CVP  Airway:  ETT  Plan Comments: Pt seen in hallway as the GI staff was emergently bringing pt to IR for attempted embolization for bleeding. There was vomit at pt side.   Accompanied the GI staff as there was an

## 2019-08-14 NOTE — PROGRESS NOTES
[facility] HOSP - [facility]    Hematology/Oncology   Progress Note    Che Chester Patient Status:  Observation    1940 MRN [id number]   Location [facility] 2W/SW Attending Jose Jones MD   Hosp Day # 0 PCP MD Mando Fang being was assessed and resources were discussed.   Appropriate resources were reviewed and discussed with the pateint and family.   Gabriela Lindsay MD

## 2019-08-14 NOTE — OR NURSING
Pt in endoscopy for flexible sigmoidoscopy. During procedure pt had one episode of emesis, became diaphoretic, BP 70s/30s and HR in 130s. Fluid bolus started. Profuse bleeding and large clots from rectum noted throughout procedure. RRT initiated.  Pt transf

## 2019-08-14 NOTE — ANESTHESIA POSTPROCEDURE EVALUATION
Patient: Bg Cuevas    Procedure Summary     Date:  08/14/19 Room / Location:  [address] [place] [address] ENDOSCOPY 03 / [address] [place] [address] ENDOSCOPY    Anesthesia Start:  1300 Anesthesia Stop:      Procedure:  FLEXIBLE SIGMOIDOSCOPY (N/A ) Diagnosis:       Rectal bleeding      (Rectal

## 2019-08-14 NOTE — PROGRESS NOTES
[facility] HOSP - [facility]    Progress Note    Ezra Mendoza Patient Status:  Observation    1940 MRN [id number]   Location [facility]5W Attending David Ribeiro MD   Hosp Day # 0 PCP Karin Mauro MD       Subjective:   Ozzie Ortega --   --  34.3   RDW 11.9  --   --  19.2*   NEPRELIM 4.24  --   --  2.64   WBC 7.8  --   --  5.7   .0  --   --  139.0*       Recent Labs   Lab 08/13/19  0341 08/14/19  0701   * 94   BUN 30* 17   CREATSERUM 1.13* 0.86   GFRAA 54* 75   GFRNAA 4 acute renal failure patients and it is not recommended for use with pregnant women.    02/11/2017 >60 >=60 Final     Comment:       Chronic Kidney Disease: GFR <60 ml/min/1.73 m2  Kidney failure: GFR <15 ml/min/1.73 m2    The accuracy of the MDRD equation i 01/29/2019 65.0 (L) 150.0 - 450.0 10(3)uL Final   11/12/2018 408 (H) 140 - 400 K/UL Final   09/21/2018 418 (H) 140 - 400 K/UL Final   09/18/2018 430 (H) 140 - 400 K/UL Final   03/23/2018 324 140 - 400 K/UL Final   03/03/2017 266 140 - 400 K/UL Final   02

## 2019-08-14 NOTE — SIGNIFICANT EVENT
[facility] HOSPITALIST  RAPID RESPONSE NOTE     Van Stevens Patient Status:  Observation    1940 MRN [id number]   Location [facility]5W Attending Kelly Mao MD   Hosp Day # 0 PCP Colleen Zamorano MD     Reason for RRT: Rapid response Hermann Mackey MD  8/14/2019

## 2019-08-14 NOTE — PROGRESS NOTES
[facility] AND [facility]  Gastroenterology Progress Note    Monserrat Cohen Patient Status:  Observation    1940 MRN [id number]   Location [facility]5W Attending Valentin Dixon MD   Hosp Day # 0 PCP Aleksandr Christopher MD     Subjective:  Anthony Mcburney removal site, unprepped given active bleeding  -Further recs after exam      Natse Glory CARTER  8/14/2019  11:20 AM

## 2019-08-14 NOTE — ANESTHESIA PROCEDURE NOTES
Airway  Date/Time: 8/14/2019 1:25 PM  Urgency: emergent    Airway not difficult    General Information and Staff    Patient location during procedure:  Other (Note) (IR)  Anesthesiologist: Markel Alexis MD  Resident/CRNA: Tracy Alvarez CRNA  Performed:

## 2019-08-14 NOTE — CM/SW NOTE
Pt admitted to [facility] following a rapid response while on the 5th floor. Pt then sent to Endo for flex sig, currently intubated and on propofol sedation.      Epic shows the pt had been in discussions with the oncology team at [facility]

## 2019-08-14 NOTE — PROCEDURES
[facility]  Procedure Note    [facility] Patient Status:  Observation    1940 MRN [id number]   Location [facility] Attending Gee Marks MD   Hosp Day # 0 PCP Katalina Martinez MD     Procedure: blush or vascular irregularity.   Next the sauce was advanced into the left internal iliac artery and angiography was performed demonstrating the origin of the right rectal artery branches, and vascular irregularity in the region of the previously placed en

## 2019-08-14 NOTE — PLAN OF CARE
Problem: Patient Centered Care  Goal: Patient preferences are identified and integrated in the patient's plan of care  Description  Interventions:  - What would you like us to know as we care for you?   - Provide timely, complete, and accurate informatio Evaluate effectiveness of GI medications  - Encourage mobilization and activity  - Obtain nutritional consult as needed  - Establish a toileting routine/schedule  - Consider collaborating with pharmacy to review patient's medication profile  Outcome: Progr

## 2019-08-14 NOTE — OPERATIVE REPORT
SIGMOIDOSCOPY REPORT    Patient Name:  Isaiah Durbin Record #: [id number]  YOB: 1940  Date of Procedure: 8/14/2019    Referring physician: Aleksandr Christopher MD    Surgeon:  Natasha Gillette MD    Pre-op diagnosis: Rectal bleeding

## 2019-08-14 NOTE — PLAN OF CARE
Problem: Patient Centered Care  Goal: Patient preferences are identified and integrated in the patient's plan of care  Description  Interventions:  - What would you like us to know as we care for you?   - Provide timely, complete, and accurate informatio tolerated  - Evaluate effectiveness of GI medications  - Encourage mobilization and activity  - Obtain nutritional consult as needed  - Establish a toileting routine/schedule  - Consider collaborating with pharmacy to review patient's medication profile  O

## 2019-08-15 ENCOUNTER — APPOINTMENT (OUTPATIENT)
Dept: GENERAL RADIOLOGY | Facility: HOSPITAL | Age: 79
DRG: 907 | End: 2019-08-15
Attending: INTERNAL MEDICINE
Payer: MEDICARE

## 2019-08-15 LAB
ALBUMIN SERPL-MCNC: 2.6 G/DL (ref 3.4–5)
ALBUMIN/GLOB SERPL: 1 {RATIO} (ref 1–2)
ALP LIVER SERPL-CCNC: 46 U/L (ref 55–142)
ALT SERPL-CCNC: 20 U/L (ref 13–56)
ANION GAP SERPL CALC-SCNC: 11 MMOL/L (ref 0–18)
AST SERPL-CCNC: 18 U/L (ref 15–37)
BILIRUB SERPL-MCNC: 0.2 MG/DL (ref 0.1–2)
BLOOD TYPE BARCODE: 5100
BUN BLD-MCNC: 20 MG/DL (ref 7–18)
BUN/CREAT SERPL: 20.8 (ref 10–20)
CALCIUM BLD-MCNC: 7.2 MG/DL (ref 8.5–10.1)
CHLORIDE SERPL-SCNC: 120 MMOL/L (ref 98–112)
CO2 SERPL-SCNC: 15 MMOL/L (ref 21–32)
CREAT BLD-MCNC: 0.96 MG/DL (ref 0.55–1.02)
GLOBULIN PLAS-MCNC: 2.7 G/DL (ref 2.8–4.4)
GLUCOSE BLD-MCNC: 174 MG/DL (ref 70–99)
HAV IGM SER QL: 1.3 MG/DL (ref 1.6–2.6)
HCT VFR BLD AUTO: 20.8 % (ref 35–48)
HCT VFR BLD AUTO: 22 % (ref 35–48)
HCT VFR BLD AUTO: 25.3 % (ref 35–48)
HCT VFR BLD AUTO: 28.2 % (ref 35–48)
HGB BLD-MCNC: 7 G/DL (ref 12–16)
HGB BLD-MCNC: 7.4 G/DL (ref 12–16)
HGB BLD-MCNC: 8.8 G/DL (ref 12–16)
HGB BLD-MCNC: 9.6 G/DL (ref 12–16)
M PROTEIN MFR SERPL ELPH: 5.3 G/DL (ref 6.4–8.2)
OSMOLALITY SERPL CALC.SUM OF ELEC: 309 MOSM/KG (ref 275–295)
PATIENT FASTING: YES
PHOSPHATE SERPL-MCNC: 3 MG/DL (ref 2.5–4.9)
POTASSIUM SERPL-SCNC: 4 MMOL/L (ref 3.5–5.1)
SODIUM SERPL-SCNC: 146 MMOL/L (ref 136–145)

## 2019-08-15 PROCEDURE — 99232 SBSQ HOSP IP/OBS MODERATE 35: CPT | Performed by: INTERNAL MEDICINE

## 2019-08-15 PROCEDURE — 99233 SBSQ HOSP IP/OBS HIGH 50: CPT | Performed by: INTERNAL MEDICINE

## 2019-08-15 NOTE — SLP NOTE
ADULT SWALLOWING EVALUATION    ASSESSMENT    ASSESSMENT/OVERALL IMPRESSION:      This BSE was ordered d/t Pt being intubated on 8/14/19 and extubated this am. Pt currently NPO. During this BSE, HOB was partially reclined d/t other medical precautions. sips;No straw  Medication Administration Recommendations: One pill at a time  Treatment Plan/Recommendations: Dysphagia therapy; Aspiration precautions  Discharge Recommendations/Plan: Undetermined    HISTORY   MEDICAL HISTORY  Reason for Referral: R/O aspi to rule-out silent aspiration.)         GOALS  Goal #1 The patient will tolerate solid consistency and thin liquids without overt signs or symptoms of aspiration with 100 % accuracy over 1-2 session(s).   In Progress   Goal #2 The patient will utilize compe

## 2019-08-15 NOTE — PLAN OF CARE
Problem: Patient Centered Care  Goal: Patient preferences are identified and integrated in the patient's plan of care  Description  Interventions:  - What would you like us to know as we care for you?   Problem: SAFETY ADULT - FALL  Goal: Free from fall i Progressing     Problem: RESPIRATORY - ADULT  Goal: Achieves optimal ventilation and oxygenation  Description  INTERVENTIONS:  - Assess for changes in respiratory status  - Assess for changes in mentation and behavior  - Position to facilitate oxygenation goals for specific interventions   Outcome: Progressing

## 2019-08-15 NOTE — CONSULTS
[facility] HOSP - [facility]    Report of Consultation    Aldochanda Mathischanda Patient Status:  Observation    1940 MRN [id number]   Location [facility] 2W/SW Attending Tiffanie Marrero MD   Hosp Day # 0 PCP Annelise Barrios MD     Date of Admissio History  History reviewed. No pertinent family history.     Social History  Patient Guardian Status:  Not on file    Other Topics            Concern  Caffeine Concern        No    Social History Narrative    None on file            Current Medications:    C hours as needed for Nausea. Potassium Chloride ER 20 MEQ Oral Tab CR Take 1 tablet by mouth 2 (two) times daily.    ALPRAZolam 0.25 MG Oral Tab TAKE 1 TABLET BY MOUTH EVERY DAY AS NEEDED   RaNITidine HCl 300 MG Oral Cap TAKE ONE CAPSULE BY MOUTH AT BEDTIM Date    WBC 8.8 08/14/2019    HGB 9.0 (L) 08/14/2019    HCT 26.3 (L) 08/14/2019    .0 08/14/2019    CREATSERUM 1.00 08/14/2019    BUN 19 (H) 08/14/2019     (H) 08/14/2019    K 4.4 08/14/2019     (H) 08/14/2019    CO2 22.0 08/14/2019    G

## 2019-08-15 NOTE — RESPIRATORY THERAPY NOTE
Received pt intubated on AC/VC 12, 400vt, +5, 40%. Breath sounds auscultated clear bilaterally. No changes overnight.

## 2019-08-15 NOTE — PROGRESS NOTES
Patient attempted to void per bed pan but unable to, bladder scan shows 600 ml of retained urine. Order obtained to insert jaeger from MD, jaeger inserted per protocol.

## 2019-08-15 NOTE — PROGRESS NOTES
Received patient from IR to PCCU room 207 at 15:15. Vital signs stable. All lines checked, zeroed, and intact.  Noted that left femoral arterial line is very positional, but functional. Report received from nurse who had patient on 5th floor, endo, IR, and

## 2019-08-15 NOTE — PROGRESS NOTES
[facility] AND [facility]  Gastroenterology Progress Note    Shun Aden Patient Status:  Observation    1940 MRN [id number]   Location [facility] 2W/SW Attending Teresa Diana MD   Hosp Day # 0 PCP Jon Luna MD     Subjective:  Sangita Maciel Profuse bleeding yesterday, flex sig noted bleeding from post resection site s/p clipping with persistent bleeding. IR embolization followed. She has not had further bleeding since, hgb stable at 9.  Remains intubated but alert and oriented    -trend hgb q8

## 2019-08-15 NOTE — PROGRESS NOTES
[facility]    Progress Note    Summer Escobar Patient Status:  Observation    1940 MRN [id number]   Location [facility]5W Attending Nicole Kruse MD   Hosp Day # 0 PCP Delano Sheppard MD       Subjective:   Luna Laura diarrhea  Patient required significant small bowel resection and therefore continues to have diarrhea requiring Lomotil on a daily basis. Respiratory ventilation  Hopefully remove ventilator today. Disposition: Home today if okay with consultants.   Jaun Harden Ref Range Status   08/14/2019 62 >=60 Final   08/14/2019 75 >=60 Final   08/13/2019 54 (L) >=60 Final   01/29/2019 >60 >=60 Final     Comment:       Estimated GFR units: mL/min/1.73 square meters  eGFR calculated by the CKD-EPI equation.    09/21/2018 >60 > 03/03/2017 5.8 4.0 - 11.0 K/UL Final   02/11/2017 9.3 4.0 - 11.0 K/UL Final     HGB   Date Value Ref Range Status   08/14/2019 9.6 (L) 12.0 - 16.0 g/dL Final   08/14/2019 9.0 (L) 12.0 - 16.0 g/dL Final   08/14/2019 8.7 (L) 12.0 - 16.0 g/dL Final   08/14/ 01/06/2016 07:38:17 No significant changes have occurred Electronically signed on 08/14/2019 at 17:25 by Roark Lundborg, MD Jacquiline Kemps, MD  8/15/2019

## 2019-08-15 NOTE — PROGRESS NOTES
[facility]     Progress Note        [facility] Patient Status:  Observation    1940 MRN [id number]   Location [facility] 2W/SW Attending Bradly Kunz MD   Hosp Day # 0 PCP Jeremias Alegria MD       Subjective:   Valeria Cox Intravenous Q8H PRN   Normal Saline Flush 0.9 % injection 10 mL 10 mL Intravenous PRN       Continuous Infusions:   • sodium chloride 125 mL/hr at 08/15/19 0300   • propofol Stopped (08/15/19 0846)       Physical Exam  Constitutional: no acute distress  HE 2019.  -Colonoscopy performed on 8/14/2019 with evidence of active bleeding noticed at prior resection site.  -Evaluated by interventional radiology.   Underwent mesenteric angiography and successful embolization on 8/14/2019.  -No evidence of any ongoing G

## 2019-08-15 NOTE — RESPIRATORY THERAPY NOTE
PT put on weaning trial 10/5/40%. PT tolerating well. RT will continue to monitor. 0920:  Pt completed weaning trial successfully with the following parameters.    RSBI 14  Spontaneous tidal volume  450  NIF -35      0925: PT extubated with out inciden

## 2019-08-15 NOTE — PROGRESS NOTES
[facility] HOSP - [facility]    Hematology/Oncology   Progress Note    Ezra Mendoza Patient Status:  Observation    1940 MRN [id number]   Location [facility] 2W/SW Attending David Ribeiro MD   Hosp Day # 0 PCP MD Jacque Cardenas Asa hematochezia GI blood loss anemia. Appreciate IR/GI consults.   Transfuse prn for hgb <7  –We discussed that we will be able to offer her chemoradiotherapy for her rectal cancer here at [place] if she would like and she is interested in possibly having

## 2019-08-16 ENCOUNTER — APPOINTMENT (OUTPATIENT)
Dept: PICC SERVICES | Facility: HOSPITAL | Age: 79
DRG: 907 | End: 2019-08-16
Attending: INTERNAL MEDICINE
Payer: MEDICARE

## 2019-08-16 LAB
ANION GAP SERPL CALC-SCNC: 7 MMOL/L (ref 0–18)
BASOPHILS # BLD AUTO: 0.03 X10(3) UL (ref 0–0.2)
BASOPHILS NFR BLD AUTO: 0.3 %
BUN BLD-MCNC: 13 MG/DL (ref 7–18)
BUN/CREAT SERPL: 18.3 (ref 10–20)
CALCIUM BLD-MCNC: 7.1 MG/DL (ref 8.5–10.1)
CHLORIDE SERPL-SCNC: 119 MMOL/L (ref 98–112)
CO2 SERPL-SCNC: 22 MMOL/L (ref 21–32)
CREAT BLD-MCNC: 0.71 MG/DL (ref 0.55–1.02)
DEPRECATED RDW RBC AUTO: 62.5 FL (ref 35.1–46.3)
EOSINOPHIL # BLD AUTO: 0.23 X10(3) UL (ref 0–0.7)
EOSINOPHIL NFR BLD AUTO: 2 %
ERYTHROCYTE [DISTWIDTH] IN BLOOD BY AUTOMATED COUNT: 17.8 % (ref 11–15)
GLUCOSE BLD-MCNC: 119 MG/DL (ref 70–99)
HAV IGM SER QL: 1.9 MG/DL (ref 1.6–2.6)
HCT VFR BLD AUTO: 19.1 % (ref 35–48)
HCT VFR BLD AUTO: 22.9 % (ref 35–48)
HCT VFR BLD AUTO: 25 % (ref 35–48)
HGB BLD-MCNC: 6.4 G/DL (ref 12–16)
HGB BLD-MCNC: 8 G/DL (ref 12–16)
HGB BLD-MCNC: 8.5 G/DL (ref 12–16)
IMM GRANULOCYTES # BLD AUTO: 0.08 X10(3) UL (ref 0–1)
IMM GRANULOCYTES NFR BLD: 0.7 %
LYMPHOCYTES # BLD AUTO: 2.54 X10(3) UL (ref 1–4)
LYMPHOCYTES NFR BLD AUTO: 22.4 %
MCH RBC QN AUTO: 32.2 PG (ref 26–34)
MCHC RBC AUTO-ENTMCNC: 33.5 G/DL (ref 31–37)
MCV RBC AUTO: 96 FL (ref 80–100)
MONOCYTES # BLD AUTO: 1.03 X10(3) UL (ref 0.1–1)
MONOCYTES NFR BLD AUTO: 9.1 %
MRSA DNA SPEC QL NAA+PROBE: NEGATIVE
NEUTROPHILS # BLD AUTO: 7.42 X10 (3) UL (ref 1.5–7.7)
NEUTROPHILS # BLD AUTO: 7.42 X10(3) UL (ref 1.5–7.7)
NEUTROPHILS NFR BLD AUTO: 65.5 %
OSMOLALITY SERPL CALC.SUM OF ELEC: 307 MOSM/KG (ref 275–295)
PLATELET # BLD AUTO: 100 10(3)UL (ref 150–450)
POTASSIUM SERPL-SCNC: 3.7 MMOL/L (ref 3.5–5.1)
RBC # BLD AUTO: 1.99 X10(6)UL (ref 3.8–5.3)
SODIUM SERPL-SCNC: 148 MMOL/L (ref 136–145)
WBC # BLD AUTO: 11.3 X10(3) UL (ref 4–11)

## 2019-08-16 PROCEDURE — 99232 SBSQ HOSP IP/OBS MODERATE 35: CPT | Performed by: INTERNAL MEDICINE

## 2019-08-16 PROCEDURE — 02HV33Z INSERTION OF INFUSION DEVICE INTO SUPERIOR VENA CAVA, PERCUTANEOUS APPROACH: ICD-10-PCS | Performed by: INTERNAL MEDICINE

## 2019-08-16 RX ORDER — SODIUM CHLORIDE 9 MG/ML
INJECTION, SOLUTION INTRAVENOUS ONCE
Status: COMPLETED | OUTPATIENT
Start: 2019-08-16 | End: 2019-08-16

## 2019-08-16 RX ORDER — POTASSIUM CHLORIDE 1.5 G/1.77G
40 POWDER, FOR SOLUTION ORAL ONCE
Status: COMPLETED | OUTPATIENT
Start: 2019-08-16 | End: 2019-08-16

## 2019-08-16 RX ORDER — SODIUM CHLORIDE 0.9 % (FLUSH) 0.9 %
10 SYRINGE (ML) INJECTION AS NEEDED
Status: DISCONTINUED | OUTPATIENT
Start: 2019-08-16 | End: 2019-08-18

## 2019-08-16 RX ORDER — LIDOCAINE HYDROCHLORIDE 10 MG/ML
0.5 INJECTION, SOLUTION INFILTRATION; PERINEURAL ONCE AS NEEDED
Status: ACTIVE | OUTPATIENT
Start: 2019-08-16 | End: 2019-08-16

## 2019-08-16 NOTE — PROGRESS NOTES
[facility]     Progress Note        Vince Jain Patient Status:  Observation    1940 MRN [id number]   Location [facility] 2W/SW Attending Joby Vazquez MD   Hosp Day # 1 PCP Berenice Cornell MD       Subjective:   Reza Marie acetaminophen (TYLENOL) tab 650 mg 650 mg Oral Q6H PRN   ondansetron HCl (ZOFRAN) injection 4 mg 4 mg Intravenous Q6H PRN   Metoclopramide HCl (REGLAN) injection 10 mg 10 mg Intravenous Q8H PRN   Normal Saline Flush 0.9 % injection 10 mL 10 mL Intravenou presents with evidence of rectal bleeding.   Recent robotic assisted transanal resection of rectal adenocarcinoma in July 2019.  -Colonoscopy performed on 8/14/2019 with evidence of active bleeding noticed at prior resection site.  -Evaluated by palmer

## 2019-08-16 NOTE — PROGRESS NOTES
[facility]  Gastroenterology Progress Note    Tiffany Rodriguez Patient Status:  Inpatient    1940 MRN [id number]   Location [facility] 2W/SW Attending Emilio Garrison MD   Hosp Day # 1 PCP Benigno Colon MD     Subjective:  Zita Corral type      Assessment/Plan:  Ms Matthew Spangler is a 65 y/o female with hx of ovarian cancer and rectal cancer s/p recent robotic transanal minimally invasive resection of mass on 7/22/19. She presents with rectal bleeding.  Profuse bleeding yesterday, flex sig noted

## 2019-08-16 NOTE — PROGRESS NOTES
[facility]    Hematology/Oncology   Progress Note    Monserrat Cohen Patient Status:  Observation    1940 MRN [id number]   Location [facility] 2W/SW Attending Valentin Dixon, [facility] Day # 1 PCP MD Dani Huntley will be able to offer her chemoradiotherapy for her rectal cancer here at [place] if she would like and she is interested in possibly having this set up for convenience.   We will help arrange        The patient's emotional well being was assessed and reso

## 2019-08-16 NOTE — PLAN OF CARE
Problem: Patient Centered Care  Goal: Patient preferences are identified and integrated in the patient's plan of care  Description  Interventions:  - What would you like us to know as we care for you? My family likes to be updated.   - Provide timely, com ordered and tolerated  - Evaluate effectiveness of GI medications  - Encourage mobilization and activity  - Obtain nutritional consult as needed  - Establish a toileting routine/schedule  - Consider collaborating with pharmacy to review patient's medicatio Initiate interventions, skin care algorithm/standards of care as needed  Outcome: Progressing     Problem: HEMATOLOGIC - ADULT  Goal: Maintains hematologic stability  Description  INTERVENTIONS  - Assess for signs and symptoms of bleeding or hemorrhage  -

## 2019-08-16 NOTE — PLAN OF CARE
Problem: Patient Centered Care  Goal: Patient preferences are identified and integrated in the patient's plan of care  Description  Interventions:  - What would you like us to know as we care for you?    - Provide timely, complete, and accurate informati Evaluate effectiveness of GI medications  - Encourage mobilization and activity  - Obtain nutritional consult as needed  - Establish a toileting routine/schedule  - Consider collaborating with pharmacy to review patient's medication profile  Outcome: Progr care algorithm/standards of care as needed  Outcome: Progressing     Problem: HEMATOLOGIC - ADULT  Goal: Maintains hematologic stability  Description  INTERVENTIONS  - Assess for signs and symptoms of bleeding or hemorrhage  - Monitor labs and vital signs

## 2019-08-16 NOTE — PROGRESS NOTES
[facility]    Progress Note    Adron Block Patient Status:  Observation    1940 MRN [id number]   Location [facility]5W Attending Raffi Kelly MD   Hosp Day # 1 PCP Elmira Colin MD       Subjective:   Latasha Esparza radiation and chemo at [facility]. Short bowel syndrome with resultant diarrhea  Patient required significant small bowel resection and therefore continues to have diarrhea requiring Lomotil on a daily basis. Respiratory ventilation  Off all oxygen.     P 1.02 mg/dL Final   10/05/2018 1.09 (H) 0.55 - 1.02 mg/dL Final   09/21/2018 0.98 0.50 - 1.50 mg/dL Final   09/18/2018 0.93 0.50 - 1.50 mg/dL Final   03/23/2018 0.85 0.50 - 1.50 mg/dL Final   03/03/2017 0.88 0.50 - 1.50 mg/dL Final   02/11/2017 1.03 0.50 - WBC   Date Value Ref Range Status   08/16/2019 11.3 (H) 4.0 - 11.0 x10(3) uL Final   08/14/2019 8.8 4.0 - 11.0 x10(3) uL Final   08/14/2019 5.7 4.0 - 11.0 x10(3) uL Final   08/13/2019 7.8 4.0 - 11.0 x10(3) uL Final   01/29/2019 4.8 4.0 - 11.0 x10(3) uL Chloride ER  10 mEq Oral BID   • Sertraline HCl  50 mg Oral QPM       Continuous Infusions:   • sodium chloride 125 mL/hr at 08/16/19 0259   • propofol Stopped (08/15/19 0846)           Xr Chest Ap Portable  (cpt=71045)    Result Date: 8/14/2019  CONCLUSIO

## 2019-08-16 NOTE — SLP NOTE
SPEECH DAILY NOTE - INPATIENT    ASSESSMENT & PLAN   ASSESSMENT  SLP f/u for meal assessment and training of safe swallowing compensatory strategies. RN reports pt tolerates diet well with no overt CSA.  Pt's son, family friend, and patient participat solids and thin liquids with no overt CSA. Continue across x1 full meal assessment as able.    In Progress   Goal #2 The patient will utilize compensatory strategies as outlined by  BSSE (clinical evaluation) including Slow rate, Small bites, Small sips, No

## 2019-08-17 LAB
BASOPHILS # BLD AUTO: 0.04 X10(3) UL (ref 0–0.2)
BASOPHILS NFR BLD AUTO: 0.4 %
BLOOD TYPE BARCODE: 5100
DEPRECATED RDW RBC AUTO: 59.7 FL (ref 35.1–46.3)
EOSINOPHIL # BLD AUTO: 0.37 X10(3) UL (ref 0–0.7)
EOSINOPHIL NFR BLD AUTO: 3.7 %
ERYTHROCYTE [DISTWIDTH] IN BLOOD BY AUTOMATED COUNT: 17.6 % (ref 11–15)
HCT VFR BLD AUTO: 22.2 % (ref 35–48)
HCT VFR BLD AUTO: 24.4 % (ref 35–48)
HGB BLD-MCNC: 7.6 G/DL (ref 12–16)
HGB BLD-MCNC: 8.1 G/DL (ref 12–16)
IMM GRANULOCYTES # BLD AUTO: 0.09 X10(3) UL (ref 0–1)
IMM GRANULOCYTES NFR BLD: 0.9 %
LYMPHOCYTES # BLD AUTO: 2.01 X10(3) UL (ref 1–4)
LYMPHOCYTES NFR BLD AUTO: 20.3 %
MCH RBC QN AUTO: 32.5 PG (ref 26–34)
MCHC RBC AUTO-ENTMCNC: 34.2 G/DL (ref 31–37)
MCV RBC AUTO: 94.9 FL (ref 80–100)
MONOCYTES # BLD AUTO: 0.86 X10(3) UL (ref 0.1–1)
MONOCYTES NFR BLD AUTO: 8.7 %
NEUTROPHILS # BLD AUTO: 6.53 X10 (3) UL (ref 1.5–7.7)
NEUTROPHILS # BLD AUTO: 6.53 X10(3) UL (ref 1.5–7.7)
NEUTROPHILS NFR BLD AUTO: 66 %
PLATELET # BLD AUTO: 90 10(3)UL (ref 150–450)
POTASSIUM SERPL-SCNC: 4 MMOL/L (ref 3.5–5.1)
RBC # BLD AUTO: 2.34 X10(6)UL (ref 3.8–5.3)
WBC # BLD AUTO: 9.9 X10(3) UL (ref 4–11)

## 2019-08-17 PROCEDURE — 99232 SBSQ HOSP IP/OBS MODERATE 35: CPT | Performed by: INTERNAL MEDICINE

## 2019-08-17 RX ORDER — DIPHENHYDRAMINE HCL 25 MG
25 CAPSULE ORAL EVERY 4 HOURS PRN
Status: DISCONTINUED | OUTPATIENT
Start: 2019-08-17 | End: 2019-08-18

## 2019-08-17 NOTE — PROGRESS NOTES
[facility]  Gastroenterology Progress Note    Ry Nick Patient Status:  Inpatient    1940 MRN [id number]   Location [facility] 3W/SW Attending Devi De Leon MD   Hosp Day # 2 PCP Dexter Badillo MD     Subjective:  Alessandra Walters AM

## 2019-08-17 NOTE — PROGRESS NOTES
[facility]    Progress Note    Rafa Saucedo Patient Status:  Inpatient    1940 MRN [id number]   Location [facility] 3W/SW Attending Jh Salazar MD   Hosp Day # 2 PCP Stephen Carter MD        Subjective:     Respir CREATSERUM 0.71 08/16/2019    BUN 13 08/16/2019     (H) 08/16/2019    K 4.0 08/17/2019     (H) 08/16/2019    CO2 22.0 08/16/2019     (H) 08/16/2019    CA 7.1 (L) 08/16/2019    ALB 2.6 (L) 08/15/2019    ALKPHO 46 (L) 08/15/2019    BILT

## 2019-08-17 NOTE — PROGRESS NOTES
[facility] HOSP - [facility]    Hematology/Oncology   Progress Note    Charanjit Thornton Patient Status:  Observation    1940 MRN [id number]   Location [facility] 2W/SW Attending Wing Basilio MD   Hosp Day # 2 PCP MD Hanane Hernandez patient's emotional well being was assessed and resources were discussed.   Appropriate resources were reviewed and discussed with the pateint and family.   Elaine Esparza MD

## 2019-08-17 NOTE — PROGRESS NOTES
[facility]    Progress Note    Mitrascarlet Jean Baptistens Patient Status:  Observation    1940 MRN [id number]   Location [facility]5W Attending Kelly Mao, [address] [facility] Day # 2 PCP Colleen Zamorano MD       Subjective:   Harsha Brewer Celeste. Short bowel syndrome with resultant diarrhea  Patient required significant small bowel resection and therefore continues to have diarrhea requiring Lomotil on a daily basis. Respiratory ventilation  Off all oxygen.     Pharyngeal dysfunction 08/14/2019 0.86 0.55 - 1.02 mg/dL Final   08/13/2019 1.13 (H) 0.55 - 1.02 mg/dL Final   01/29/2019 0.90 0.50 - 1.50 mg/dL Final   11/05/2018 1.14 (H) 0.55 - 1.02 mg/dL Final   10/16/2018 1.11 (H) 0.55 - 1.02 mg/dL Final   10/05/2018 1.09 (H) 0.55 - 1.02 08/13/2019 47 (L) >=60 Final   01/29/2019 >60 >=60 Final   09/21/2018 56 (L) >=60 Final   09/18/2018 59 (L) >=60 Final   03/23/2018 >60 >=60 Final   03/03/2017 >60 >=60 Final   02/11/2017 52 (L) >=60 Final     WBC   Date Value Ref Range Status   08/17/20 MD  8/17/2019

## 2019-08-18 VITALS
TEMPERATURE: 99 F | OXYGEN SATURATION: 96 % | HEIGHT: 59.02 IN | SYSTOLIC BLOOD PRESSURE: 126 MMHG | WEIGHT: 152.88 LBS | DIASTOLIC BLOOD PRESSURE: 60 MMHG | RESPIRATION RATE: 18 BRPM | HEART RATE: 74 BPM | BODY MASS INDEX: 30.82 KG/M2

## 2019-08-18 LAB
BASOPHILS # BLD AUTO: 0.03 X10(3) UL (ref 0–0.2)
BASOPHILS NFR BLD AUTO: 0.3 %
DEPRECATED RDW RBC AUTO: 57.7 FL (ref 35.1–46.3)
EOSINOPHIL # BLD AUTO: 0.37 X10(3) UL (ref 0–0.7)
EOSINOPHIL NFR BLD AUTO: 4.2 %
ERYTHROCYTE [DISTWIDTH] IN BLOOD BY AUTOMATED COUNT: 16.9 % (ref 11–15)
HCT VFR BLD AUTO: 22.3 % (ref 35–48)
HGB BLD-MCNC: 7.5 G/DL (ref 12–16)
IMM GRANULOCYTES # BLD AUTO: 0.11 X10(3) UL (ref 0–1)
IMM GRANULOCYTES NFR BLD: 1.3 %
LYMPHOCYTES # BLD AUTO: 2.08 X10(3) UL (ref 1–4)
LYMPHOCYTES NFR BLD AUTO: 23.9 %
MCH RBC QN AUTO: 31.9 PG (ref 26–34)
MCHC RBC AUTO-ENTMCNC: 33.6 G/DL (ref 31–37)
MCV RBC AUTO: 94.9 FL (ref 80–100)
MONOCYTES # BLD AUTO: 0.68 X10(3) UL (ref 0.1–1)
MONOCYTES NFR BLD AUTO: 7.8 %
NEUTROPHILS # BLD AUTO: 5.44 X10 (3) UL (ref 1.5–7.7)
NEUTROPHILS # BLD AUTO: 5.44 X10(3) UL (ref 1.5–7.7)
NEUTROPHILS NFR BLD AUTO: 62.5 %
PLATELET # BLD AUTO: 108 10(3)UL (ref 150–450)
RBC # BLD AUTO: 2.35 X10(6)UL (ref 3.8–5.3)
WBC # BLD AUTO: 8.7 X10(3) UL (ref 4–11)

## 2019-08-18 PROCEDURE — 99232 SBSQ HOSP IP/OBS MODERATE 35: CPT | Performed by: INTERNAL MEDICINE

## 2019-08-18 RX ORDER — TOBRAMYCIN 3 MG/ML
2 SOLUTION/ DROPS OPHTHALMIC EVERY 4 HOURS
Qty: 1 BOTTLE | Refills: 0 | Status: SHIPPED | OUTPATIENT
Start: 2019-08-18 | End: 2019-08-25

## 2019-08-18 RX ORDER — DIPHENHYDRAMINE HCL 25 MG
25 CAPSULE ORAL EVERY 4 HOURS PRN
Refills: 0 | Status: ON HOLD | COMMUNITY
Start: 2019-08-18 | End: 2021-04-05

## 2019-08-18 RX ORDER — MELATONIN
325
Status: DISCONTINUED | OUTPATIENT
Start: 2019-08-19 | End: 2019-08-18

## 2019-08-18 NOTE — CM/SW NOTE
Patient has been referred to [facility]. Orders and face to face entered, dc confirmed for 8.18.19.       Laci Jaimes, [address]

## 2019-08-18 NOTE — PROGRESS NOTES
[facility]    Hematology/Oncology   Progress Note    Carley Chavez Patient Status:  Observation    1940 MRN [id number]   Location [facility] 2W/SW Attending Reddy Regan, [facility] Se Day # 3 PCP MD Winnie Plata

## 2019-08-18 NOTE — PROGRESS NOTES
[facility]    Progress Note    Kulwinder Quezada Patient Status:  Observation    1940 MRN [id number]   Location [address] [place] [address] Attending Hien Tejada, [facility] Day # 3 PCP Trent Houser MD       Subjective:   Gissel Aranda chemo at [facility]. Short bowel syndrome with resultant diarrhea  Patient required significant small bowel resection and therefore continues to have diarrhea requiring Lomotil on a daily basis. Respiratory ventilation  Off all oxygen.     Pharyngeal dysf 01/29/2019 0.90 0.50 - 1.50 mg/dL Final   11/05/2018 1.14 (H) 0.55 - 1.02 mg/dL Final   10/16/2018 1.11 (H) 0.55 - 1.02 mg/dL Final   10/05/2018 1.09 (H) 0.55 - 1.02 mg/dL Final   09/21/2018 0.98 0.50 - 1.50 mg/dL Final   09/18/2018 0.93 0.50 - 1.50 mg/d 09/18/2018 59 (L) >=60 Final   03/23/2018 >60 >=60 Final   03/03/2017 >60 >=60 Final   02/11/2017 52 (L) >=60 Final     WBC   Date Value Ref Range Status   08/18/2019 8.7 4.0 - 11.0 x10(3) uL Final   08/17/2019 9.9 4.0 - 11.0 x10(3) uL Final   08/16/2019

## 2019-08-18 NOTE — PROGRESS NOTES
[facility] AND [facility]  Gastroenterology Progress Note    Marilynn Tinajero Patient Status:  Inpatient    1940 MRN [id number]   Location [facility] 3W/SW Attending Jay Murphy, [address] [facility] Se Day # 3 PCP Michaela Rogel MD     Subjective:  Ivan Archuleta

## 2019-08-18 NOTE — HOME CARE LIAISON
Received notification from TOAN [place] that patient will need home health services. Clarified with RODOLFO Patel for [facility] to meet with patient, received confirmation. Met with patient at the bedside.  Patient is agreeable to [facility]

## 2019-08-20 ENCOUNTER — LAB REQUISITION (OUTPATIENT)
Dept: LAB | Facility: HOSPITAL | Age: 79
End: 2019-08-20
Payer: MEDICARE

## 2019-08-20 ENCOUNTER — OFFICE VISIT (OUTPATIENT)
Dept: HEMATOLOGY/ONCOLOGY | Facility: HOSPITAL | Age: 79
End: 2019-08-20
Attending: INTERNAL MEDICINE
Payer: MEDICARE

## 2019-08-20 VITALS
BODY MASS INDEX: 28.84 KG/M2 | RESPIRATION RATE: 18 BRPM | TEMPERATURE: 99 F | HEART RATE: 87 BPM | HEIGHT: 59 IN | OXYGEN SATURATION: 97 % | SYSTOLIC BLOOD PRESSURE: 131 MMHG | WEIGHT: 143.06 LBS | DIASTOLIC BLOOD PRESSURE: 56 MMHG

## 2019-08-20 DIAGNOSIS — C56.9 MALIGNANT NEOPLASM OF OVARY, UNSPECIFIED LATERALITY (HCC): Primary | ICD-10-CM

## 2019-08-20 DIAGNOSIS — Z51.11 CHEMOTHERAPY MANAGEMENT, ENCOUNTER FOR: ICD-10-CM

## 2019-08-20 DIAGNOSIS — C20 RECTAL CANCER (HCC): ICD-10-CM

## 2019-08-20 DIAGNOSIS — K92.2 GASTROINTESTINAL HEMORRHAGE: ICD-10-CM

## 2019-08-20 DIAGNOSIS — D50.8 OTHER IRON DEFICIENCY ANEMIA: ICD-10-CM

## 2019-08-20 LAB
BASOPHILS # BLD AUTO: 0.04 X10(3) UL (ref 0–0.2)
BASOPHILS NFR BLD AUTO: 0.5 %
DEPRECATED RDW RBC AUTO: 53.7 FL (ref 35.1–46.3)
EOSINOPHIL # BLD AUTO: 0.27 X10(3) UL (ref 0–0.7)
EOSINOPHIL NFR BLD AUTO: 3.6 %
ERYTHROCYTE [DISTWIDTH] IN BLOOD BY AUTOMATED COUNT: 15.7 % (ref 11–15)
HCT VFR BLD AUTO: 27.9 % (ref 35–48)
HGB BLD-MCNC: 9.3 G/DL (ref 12–16)
IMM GRANULOCYTES # BLD AUTO: 0.09 X10(3) UL (ref 0–1)
IMM GRANULOCYTES NFR BLD: 1.2 %
LYMPHOCYTES # BLD AUTO: 1.48 X10(3) UL (ref 1–4)
LYMPHOCYTES NFR BLD AUTO: 19.9 %
MCH RBC QN AUTO: 32.2 PG (ref 26–34)
MCHC RBC AUTO-ENTMCNC: 33.3 G/DL (ref 31–37)
MCV RBC AUTO: 96.5 FL (ref 80–100)
MONOCYTES # BLD AUTO: 0.74 X10(3) UL (ref 0.1–1)
MONOCYTES NFR BLD AUTO: 10 %
NEUTROPHILS # BLD AUTO: 4.81 X10 (3) UL (ref 1.5–7.7)
NEUTROPHILS # BLD AUTO: 4.81 X10(3) UL (ref 1.5–7.7)
NEUTROPHILS NFR BLD AUTO: 64.8 %
PLATELET # BLD AUTO: 172 10(3)UL (ref 150–450)
RBC # BLD AUTO: 2.89 X10(6)UL (ref 3.8–5.3)
WBC # BLD AUTO: 7.4 X10(3) UL (ref 4–11)

## 2019-08-20 PROCEDURE — 99215 OFFICE O/P EST HI 40 MIN: CPT | Performed by: INTERNAL MEDICINE

## 2019-08-20 PROCEDURE — 85025 COMPLETE CBC W/AUTO DIFF WBC: CPT

## 2019-08-20 RX ORDER — CAPECITABINE 150 MG/1
450 TABLET, FILM COATED ORAL 2 TIMES DAILY
Qty: 84 TABLET | Refills: 0 | Status: SHIPPED | OUTPATIENT
Start: 2019-08-20 | End: 2019-08-20

## 2019-08-20 RX ORDER — CAPECITABINE 500 MG/1
625 TABLET, FILM COATED ORAL 2 TIMES DAILY
Qty: 112 TABLET | Refills: 0 | Status: SHIPPED | OUTPATIENT
Start: 2019-08-20 | End: 2019-09-17

## 2019-08-20 RX ORDER — CAPECITABINE 500 MG/1
500 TABLET, FILM COATED ORAL 2 TIMES DAILY
Qty: 28 TABLET | Refills: 0 | Status: SHIPPED | OUTPATIENT
Start: 2019-08-20 | End: 2019-08-20

## 2019-08-20 NOTE — PROGRESS NOTES
Cancer Center Progress Note    Patient Name: Shante Pruitt   YOB: 1940   Medical Record Number: [id number]   Attending Physician: Kristopher Martell M.D.      Chief Complaint:  Rectal cancer, ovarian cancer    History of Present Illness:  Fabiola (monoclonal gammopathy of unknown significance)    • Pernicious anemia    • Type II or unspecified type diabetes mellitus without mention of complication, not stated as uncontrolled    • Unspecified essential hypertension        Past Surgical History:  Pas activity: Not on file    Other Topics      Concerns:         Service: Not Asked        Blood Transfusions: Not Asked        Caffeine Concern: No        Occupational Exposure: Not Asked        Hobby Hazards: Not Asked        Sleep Concern: Not Asked (ZOCOR) 20 MG Oral Tab, Take 20 mg by mouth daily. , Disp: , Rfl:   •  Sertraline HCl (ZOLOFT) 50 MG Oral Tab, Take 100 mg by mouth daily.   , Disp: , Rfl:   •  Pantoprazole Sodium (PROTONIX) 40 MG Oral Tab EC, Take 40 mg by mouth every morning before toni 08/17/2019     (H) 08/16/2019    CO2 22.0 08/16/2019       Radiology:  none    Cancer Staging  No matching staging information was found for the patient.     Impression and Plan:  26-year-old female with a history of non-muscle invasive bladder and di

## 2019-08-21 ENCOUNTER — TELEPHONE (OUTPATIENT)
Dept: HEMATOLOGY/ONCOLOGY | Facility: HOSPITAL | Age: 79
End: 2019-08-21

## 2019-08-21 NOTE — TELEPHONE ENCOUNTER
----- Message from Kenan Domínguez, RN sent at 8/20/2019  4:23 PM CDT -----  Regarding: Radiation oncology referral  Dr Alexis Centeno referring this patient to rad onc for rectal cancer, please schedule consult.     Thanks  Rolando

## 2019-08-21 NOTE — TELEPHONE ENCOUNTER
Pt called back and made her aware  is referring her to see rad onc. She stts she wants her son to come with her but is leaving out of town and not sure when he'll be back.  She will call back once she knows when he's available and will then reschedul

## 2019-08-23 ENCOUNTER — TELEPHONE (OUTPATIENT)
Dept: HEMATOLOGY/ONCOLOGY | Facility: HOSPITAL | Age: 79
End: 2019-08-23

## 2019-08-23 NOTE — TELEPHONE ENCOUNTER
Introduced myself as the GI Navigator. Provided direct number. Encouraged to call prn. [place] [facility] [place] [place], especially with daily radiation. She states she was called by [facility] regarding capecitabine Rx.   Instruct

## 2019-08-27 ENCOUNTER — LAB REQUISITION (OUTPATIENT)
Dept: LAB | Facility: HOSPITAL | Age: 79
End: 2019-08-27
Payer: MEDICARE

## 2019-08-27 DIAGNOSIS — D51.0 VITAMIN B12 DEFICIENCY ANEMIA DUE TO INTRINSIC FACTOR DEFICIENCY: ICD-10-CM

## 2019-08-27 LAB
BASOPHILS # BLD AUTO: 0.05 X10(3) UL (ref 0–0.2)
BASOPHILS NFR BLD AUTO: 0.9 %
DEPRECATED RDW RBC AUTO: 55.6 FL (ref 35.1–46.3)
EOSINOPHIL # BLD AUTO: 0.18 X10(3) UL (ref 0–0.7)
EOSINOPHIL NFR BLD AUTO: 3.1 %
ERYTHROCYTE [DISTWIDTH] IN BLOOD BY AUTOMATED COUNT: 15.5 % (ref 11–15)
HCT VFR BLD AUTO: 29.6 % (ref 35–48)
HGB BLD-MCNC: 9.7 G/DL (ref 12–16)
IMM GRANULOCYTES # BLD AUTO: 0.09 X10(3) UL (ref 0–1)
IMM GRANULOCYTES NFR BLD: 1.6 %
LYMPHOCYTES # BLD AUTO: 1.5 X10(3) UL (ref 1–4)
LYMPHOCYTES NFR BLD AUTO: 26 %
MCH RBC QN AUTO: 32.2 PG (ref 26–34)
MCHC RBC AUTO-ENTMCNC: 32.8 G/DL (ref 31–37)
MCV RBC AUTO: 98.3 FL (ref 80–100)
MONOCYTES # BLD AUTO: 0.35 X10(3) UL (ref 0.1–1)
MONOCYTES NFR BLD AUTO: 6.1 %
NEUTROPHILS # BLD AUTO: 3.61 X10 (3) UL (ref 1.5–7.7)
NEUTROPHILS # BLD AUTO: 3.61 X10(3) UL (ref 1.5–7.7)
NEUTROPHILS NFR BLD AUTO: 62.3 %
PLATELET # BLD AUTO: 304 10(3)UL (ref 150–450)
RBC # BLD AUTO: 3.01 X10(6)UL (ref 3.8–5.3)
WBC # BLD AUTO: 5.8 X10(3) UL (ref 4–11)

## 2019-08-27 PROCEDURE — 85025 COMPLETE CBC W/AUTO DIFF WBC: CPT | Performed by: INTERNAL MEDICINE

## 2019-08-29 ENCOUNTER — TELEPHONE (OUTPATIENT)
Dept: HEMATOLOGY/ONCOLOGY | Facility: HOSPITAL | Age: 79
End: 2019-08-29

## 2019-08-29 NOTE — TELEPHONE ENCOUNTER
Micah Been returned call from Steve she asked if when call is returned it can be on her home phone she can't hear cell [phone].  Thanks

## 2019-08-29 NOTE — TELEPHONE ENCOUNTER
Pt called asking to have Alessandro return her call today. She has questions about chemo and other \"things\". Return call to [phone].

## 2019-08-30 ENCOUNTER — OFFICE VISIT (OUTPATIENT)
Dept: RADIATION ONCOLOGY | Facility: HOSPITAL | Age: 79
End: 2019-08-30
Attending: RADIOLOGY
Payer: MEDICARE

## 2019-08-30 ENCOUNTER — OFFICE VISIT (OUTPATIENT)
Dept: HEMATOLOGY/ONCOLOGY | Facility: HOSPITAL | Age: 79
End: 2019-08-30
Attending: PHYSICIAN ASSISTANT
Payer: MEDICARE

## 2019-08-30 VITALS
RESPIRATION RATE: 18 BRPM | DIASTOLIC BLOOD PRESSURE: 54 MMHG | HEIGHT: 59 IN | SYSTOLIC BLOOD PRESSURE: 107 MMHG | WEIGHT: 143.63 LBS | BODY MASS INDEX: 28.96 KG/M2 | TEMPERATURE: 98 F | HEART RATE: 72 BPM

## 2019-08-30 DIAGNOSIS — C20 RECTAL CANCER (HCC): Primary | ICD-10-CM

## 2019-08-30 PROCEDURE — 99215 OFFICE O/P EST HI 40 MIN: CPT | Performed by: PHYSICIAN ASSISTANT

## 2019-08-30 PROCEDURE — 99212 OFFICE O/P EST SF 10 MIN: CPT

## 2019-08-30 NOTE — PROGRESS NOTES
Medication Education Record: Oral Therapy    Learner:  Patient    Barriers / Limitations:  None    Psychosocial Assessment:  patient psychosocial response appropriate    Diagnosis:   Rectal cancer      Medication Name Dose/Strength Frequency   Capecitabine needed     Helpful hints during cancer treatment:    Diet:  o Avoid greasy or spicy foods on days surrounding chemotherapy  o Eat small frequent meals per day (6-7 meals) rather than 3 large meals  o Choose high calorie/high protein foods (chicken, hard co hat and use sunscreen. Apply alcohol-free moisturizer as needed.       When to call the doctor:  • Fever of 100.5 or greater or shaking chills  • Nausea/vomiting not controlled with anti-nausea medications: Unable to drink for 24 hours or have signs of deh medication. Handling oral medication:    1. Confirm that the medication is appropriately labeled. 2. Only patients who need chemotherapy should take or touch the medication.     3. If caregivers are involved, caregivers should wear gloves when administ vomit.  Dispose of the used gloves after each use and wash hands with soap and water. 2. Any sheets or clothes soiled with the bodily fluids should be machine washed twice in hot water with regular laundry detergent.   Do not wash soiled garments with hand education, verbalized understanding, all questions were answered and patient was instructed to call with further questions. Reinforcement of education is needed at next visit? Yes  Include language provided and  information (if applicable).

## 2019-08-30 NOTE — PROGRESS NOTES
Nursing Consultation Note  Patient: Richi Aguillon  YOB: 1940  Age: 66year old  Radiation Oncologist: Dr. Bethel Gant  Referring Physician: Sb Sibley  Diagnosis:No diagnosis found.   Consult Date: 8/30/2019      Chemotherapy: daily. Disp:  Rfl:    aspirin 81 MG Oral Tab Take 81 mg by mouth daily. Disp:  Rfl:    LORazepam 1 MG Oral Tab Take 1 mg by mouth every 8 (eight) hours as needed (breakthrough nausea). Disp:  Rfl:    MAGNESIUM OR Take by mouth daily.  Disp:  Rfl:    ondaashley anemia    • Pernicious anemia    • Rectal cancer (HCC)    • Transfusion history    • Type II or unspecified type diabetes mellitus without mention of complication, not stated as uncontrolled    • Unspecified essential hypertension        Past Surgical Hist Not Asked        Blood Transfusions: Not Asked        Caffeine Concern: No        Occupational Exposure: Not Asked        Hobby Hazards: Not Asked        Sleep Concern: Not Asked        Stress Concern: Not Asked        Weight Concern: Not Asked        Spec used as needed, during radiation,  depending on her skin reaction. CT simulation appointment given for 9/5 @ 1000. Primary language:  English  Language line required?  no  Comprehension Ability:  excellent  Able to read?  yes  Able to write?   yes  Comm Outcome:  Unchanged    Pamphlets/Handouts Given to Patient:  Understanding radiation therapy     Diarrhea Plan Of Care:    Problem:  Diarrhea    Problems related to:    Side effects of treatment    Interventions:  Monitor bowel function  Instruct patient/f Patient:  Rectal skin care sheet provided.

## 2019-08-30 NOTE — PATIENT INSTRUCTIONS
Medication Education Record: Oral Therapy    Learner:  Patient    Barriers / Limitations:  None    Psychosocial Assessment:  patient psychosocial response appropriate    Diagnosis:   Rectal cancer      Medication Name Dose/Strength Frequency   Capecitabine needed     Helpful hints during cancer treatment:    Diet:  o Avoid greasy or spicy foods on days surrounding chemotherapy  o Eat small frequent meals per day (6-7 meals) rather than 3 large meals  o Choose high calorie/high protein foods (chicken, hard co hat and use sunscreen. Apply alcohol-free moisturizer as needed.       When to call the doctor:  • Fever of 100.5 or greater or shaking chills  • Nausea/vomiting not controlled with anti-nausea medications: Unable to drink for 24 hours or have signs of deh medication. Handling oral medication:    1. Confirm that the medication is appropriately labeled. 2. Only patients who need chemotherapy should take or touch the medication.     3. If caregivers are involved, caregivers should wear gloves when administ vomit.  Dispose of the used gloves after each use and wash hands with soap and water. 2. Any sheets or clothes soiled with the bodily fluids should be machine washed twice in hot water with regular laundry detergent.   Do not wash soiled garments with hand

## 2019-09-01 ENCOUNTER — APPOINTMENT (OUTPATIENT)
Dept: RADIATION ONCOLOGY | Facility: HOSPITAL | Age: 79
End: 2019-09-01
Attending: RADIOLOGY
Payer: MEDICARE

## 2019-09-05 ENCOUNTER — APPOINTMENT (OUTPATIENT)
Dept: RADIATION ONCOLOGY | Facility: HOSPITAL | Age: 79
End: 2019-09-05
Attending: RADIOLOGY
Payer: MEDICARE

## 2019-09-05 PROCEDURE — 77470 SPECIAL RADIATION TREATMENT: CPT | Performed by: RADIOLOGY

## 2019-09-05 PROCEDURE — 77334 RADIATION TREATMENT AID(S): CPT | Performed by: RADIOLOGY

## 2019-09-06 NOTE — DISCHARGE SUMMARY
[facility]    PATIENT'S NAME: Kit Rubin NINA   ATTENDING PHYSICIAN: Brett Machado MD   PATIENT ACCOUNT#:   [id number]    LOCATION:  [address] [place] [address] RECORD #:   [id number]       YOB: 1940  ADMISSION DATE:       08/13/ discontinued, and Interventional Radiology was asked to see the patient for possible embolization. So, a consultation was obtained with Dr. Dontae Rendon, and the patient underwent embolization of the culprit artery.    The patient did well postprocedure and was m

## 2019-09-09 PROCEDURE — 77301 RADIOTHERAPY DOSE PLAN IMRT: CPT | Performed by: RADIOLOGY

## 2019-09-09 PROCEDURE — 77300 RADIATION THERAPY DOSE PLAN: CPT | Performed by: RADIOLOGY

## 2019-09-09 PROCEDURE — 77338 DESIGN MLC DEVICE FOR IMRT: CPT | Performed by: RADIOLOGY

## 2019-09-09 NOTE — CONSULTS
[facility]    PATIENT'S NAME: Beard Zeke NINA   RADIATION ONCOLOGIST: Anna Dorman.  Dexter Kaplan MD   PATIENT ACCOUNT #: [de-identified] LOCATION: [address] [place] [address] RECORD #: [id number] YOB: 1940   CONSULTATION DATE: 08/30/2019       EMMANUEL thought to be a good candidate for additional surgery and was referred for concomitant chemoradiotherapy. The patient was admitted to [facility] after developing some sudden hematochezia and GI blood loss, and her hemoglobin dropped to 7.1.   She un A 14-point review of systems was performed. Pertinent positives and negatives are as per HPI. PHYSICAL EXAMINATION:    GENERAL:  Physical exam reveals a 77-year-old female who is pleasant, cooperative and alert, awake, and oriented x3.   She is in no To that extent, I recommend 4500 cGy to the pelvis with an additional 540 cGy to the resection site. This should be done in conjunction with chemotherapy. The chemotherapy will be administered by Dr. Debbie Anna.   I am optimistic that with this treatment, we wi MD Kenn Wagoner.  MD Dr. Codi Sheehan

## 2019-09-11 ENCOUNTER — TELEPHONE (OUTPATIENT)
Dept: HEMATOLOGY/ONCOLOGY | Facility: HOSPITAL | Age: 79
End: 2019-09-11

## 2019-09-11 ENCOUNTER — LAB REQUISITION (OUTPATIENT)
Dept: LAB | Facility: HOSPITAL | Age: 79
End: 2019-09-11
Payer: MEDICARE

## 2019-09-11 DIAGNOSIS — D64.9 ANEMIA: ICD-10-CM

## 2019-09-11 DIAGNOSIS — D51.0 VITAMIN B12 DEFICIENCY ANEMIA DUE TO INTRINSIC FACTOR DEFICIENCY: ICD-10-CM

## 2019-09-11 DIAGNOSIS — C20 RECTAL CANCER (HCC): Primary | ICD-10-CM

## 2019-09-11 LAB
BASOPHILS # BLD AUTO: 0.05 X10(3) UL (ref 0–0.2)
BASOPHILS NFR BLD AUTO: 1.1 %
DEPRECATED RDW RBC AUTO: 55.5 FL (ref 35.1–46.3)
EOSINOPHIL # BLD AUTO: 0.22 X10(3) UL (ref 0–0.7)
EOSINOPHIL NFR BLD AUTO: 4.7 %
ERYTHROCYTE [DISTWIDTH] IN BLOOD BY AUTOMATED COUNT: 15.7 % (ref 11–15)
HCT VFR BLD AUTO: 27.2 % (ref 35–48)
HGB BLD-MCNC: 9.1 G/DL (ref 12–16)
IMM GRANULOCYTES # BLD AUTO: 0.03 X10(3) UL (ref 0–1)
IMM GRANULOCYTES NFR BLD: 0.6 %
LYMPHOCYTES # BLD AUTO: 1.64 X10(3) UL (ref 1–4)
LYMPHOCYTES NFR BLD AUTO: 34.8 %
MCH RBC QN AUTO: 32.3 PG (ref 26–34)
MCHC RBC AUTO-ENTMCNC: 33.5 G/DL (ref 31–37)
MCV RBC AUTO: 96.5 FL (ref 80–100)
MONOCYTES # BLD AUTO: 0.46 X10(3) UL (ref 0.1–1)
MONOCYTES NFR BLD AUTO: 9.8 %
NEUTROPHILS # BLD AUTO: 2.31 X10 (3) UL (ref 1.5–7.7)
NEUTROPHILS # BLD AUTO: 2.31 X10(3) UL (ref 1.5–7.7)
NEUTROPHILS NFR BLD AUTO: 49 %
PLATELET # BLD AUTO: 169 10(3)UL (ref 150–450)
RBC # BLD AUTO: 2.82 X10(6)UL (ref 3.8–5.3)
WBC # BLD AUTO: 4.7 X10(3) UL (ref 4–11)

## 2019-09-11 PROCEDURE — 85025 COMPLETE CBC W/AUTO DIFF WBC: CPT | Performed by: INTERNAL MEDICINE

## 2019-09-11 NOTE — TELEPHONE ENCOUNTER
Called and spoke with patient- confirmed with Yahaira Hair PA that patient can continue on her Pantoprazole while on Capecitabine- we do not want her to take tums or rolaids. Patient stated understanding.   Discussed with patient that we will need to have la

## 2019-09-17 ENCOUNTER — NURSE ONLY (OUTPATIENT)
Dept: HEMATOLOGY/ONCOLOGY | Facility: HOSPITAL | Age: 79
End: 2019-09-17
Attending: RADIOLOGY
Payer: MEDICARE

## 2019-09-17 ENCOUNTER — APPOINTMENT (OUTPATIENT)
Dept: RADIATION ONCOLOGY | Facility: HOSPITAL | Age: 79
End: 2019-09-17
Attending: RADIOLOGY
Payer: MEDICARE

## 2019-09-17 VITALS
DIASTOLIC BLOOD PRESSURE: 59 MMHG | SYSTOLIC BLOOD PRESSURE: 117 MMHG | RESPIRATION RATE: 18 BRPM | WEIGHT: 145 LBS | HEIGHT: 59 IN | TEMPERATURE: 98 F | BODY MASS INDEX: 29.23 KG/M2 | HEART RATE: 59 BPM

## 2019-09-17 DIAGNOSIS — C20 RECTAL CANCER (HCC): Primary | ICD-10-CM

## 2019-09-17 DIAGNOSIS — C20 RECTAL CANCER (HCC): ICD-10-CM

## 2019-09-17 LAB
ALBUMIN SERPL-MCNC: 3.7 G/DL (ref 3.4–5)
ALBUMIN/GLOB SERPL: 1 {RATIO} (ref 1–2)
ALP LIVER SERPL-CCNC: 84 U/L (ref 55–142)
ALT SERPL-CCNC: 18 U/L (ref 13–56)
ANION GAP SERPL CALC-SCNC: 8 MMOL/L (ref 0–18)
AST SERPL-CCNC: 15 U/L (ref 15–37)
BASOPHILS # BLD AUTO: 0.06 X10(3) UL (ref 0–0.2)
BASOPHILS NFR BLD AUTO: 1.1 %
BILIRUB SERPL-MCNC: 0.3 MG/DL (ref 0.1–2)
BUN BLD-MCNC: 26 MG/DL (ref 7–18)
BUN/CREAT SERPL: 23.2 (ref 10–20)
CALCIUM BLD-MCNC: 9.1 MG/DL (ref 8.5–10.1)
CHLORIDE SERPL-SCNC: 108 MMOL/L (ref 98–112)
CO2 SERPL-SCNC: 22 MMOL/L (ref 21–32)
CREAT BLD-MCNC: 1.12 MG/DL (ref 0.55–1.02)
DEPRECATED RDW RBC AUTO: 55.7 FL (ref 35.1–46.3)
EOSINOPHIL # BLD AUTO: 0.21 X10(3) UL (ref 0–0.7)
EOSINOPHIL NFR BLD AUTO: 3.7 %
ERYTHROCYTE [DISTWIDTH] IN BLOOD BY AUTOMATED COUNT: 15.7 % (ref 11–15)
GLOBULIN PLAS-MCNC: 3.7 G/DL (ref 2.8–4.4)
GLUCOSE BLD-MCNC: 91 MG/DL (ref 70–99)
HCT VFR BLD AUTO: 28.8 % (ref 35–48)
HGB BLD-MCNC: 9.6 G/DL (ref 12–16)
IMM GRANULOCYTES # BLD AUTO: 0.04 X10(3) UL (ref 0–1)
IMM GRANULOCYTES NFR BLD: 0.7 %
LYMPHOCYTES # BLD AUTO: 2.26 X10(3) UL (ref 1–4)
LYMPHOCYTES NFR BLD AUTO: 39.6 %
M PROTEIN MFR SERPL ELPH: 7.4 G/DL (ref 6.4–8.2)
MCH RBC QN AUTO: 32.4 PG (ref 26–34)
MCHC RBC AUTO-ENTMCNC: 33.3 G/DL (ref 31–37)
MCV RBC AUTO: 97.3 FL (ref 80–100)
MONOCYTES # BLD AUTO: 0.57 X10(3) UL (ref 0.1–1)
MONOCYTES NFR BLD AUTO: 10 %
NEUTROPHILS # BLD AUTO: 2.57 X10 (3) UL (ref 1.5–7.7)
NEUTROPHILS # BLD AUTO: 2.57 X10(3) UL (ref 1.5–7.7)
NEUTROPHILS NFR BLD AUTO: 44.9 %
OSMOLALITY SERPL CALC.SUM OF ELEC: 290 MOSM/KG (ref 275–295)
PATIENT FASTING: NO
PLATELET # BLD AUTO: 184 10(3)UL (ref 150–450)
POTASSIUM SERPL-SCNC: 4.2 MMOL/L (ref 3.5–5.1)
RBC # BLD AUTO: 2.96 X10(6)UL (ref 3.8–5.3)
SODIUM SERPL-SCNC: 138 MMOL/L (ref 136–145)
WBC # BLD AUTO: 5.7 X10(3) UL (ref 4–11)

## 2019-09-17 PROCEDURE — 80053 COMPREHEN METABOLIC PANEL: CPT

## 2019-09-17 PROCEDURE — 36415 COLL VENOUS BLD VENIPUNCTURE: CPT

## 2019-09-17 PROCEDURE — 85025 COMPLETE CBC W/AUTO DIFF WBC: CPT

## 2019-09-17 PROCEDURE — 77386 HC IMRT COMPLEX: CPT | Performed by: RADIOLOGY

## 2019-09-17 NOTE — PROGRESS NOTES
[facility] Radiation Treatment Management Note 1-5    Patient:  Carrie Holden  Age:  66year old  Visit Diagnosis:    1.  Rectal cancer [facility])      Primary Rad/Onc:  Dr. Kathia Saxena    Site Delivered Dose (Gy) Prescribed Dose (G

## 2019-09-18 PROCEDURE — 77386 HC IMRT COMPLEX: CPT | Performed by: RADIOLOGY

## 2019-09-19 ENCOUNTER — DIETICIAN VISIT (OUTPATIENT)
Dept: NUTRITION | Facility: HOSPITAL | Age: 79
End: 2019-09-19

## 2019-09-19 VITALS — WEIGHT: 143.63 LBS | BODY MASS INDEX: 29 KG/M2

## 2019-09-19 PROCEDURE — 77386 HC IMRT COMPLEX: CPT | Performed by: RADIOLOGY

## 2019-09-19 NOTE — PROGRESS NOTES
Oncology Nutrition Assessment    Ht Readings from Last 1 Encounters:  09/17/19 : 149.9 cm (4' 11\")      Wt Readings from Last 1 Encounters:  09/19/19 : 65.1 kg (143 lb 9.6 oz)    BMI Calculated: Body mass index is 29 kg/m². Weight History:   Wt Readings Interventions:   Discussed importance of good oral intake for wt maint during tx. Urged adequate fluids. Balance rest and activity. Provided handout on diet for diarrhea. Pt follows to some degree already. Will review stricter compliance if needed.

## 2019-09-20 ENCOUNTER — TELEPHONE (OUTPATIENT)
Dept: HEMATOLOGY/ONCOLOGY | Facility: HOSPITAL | Age: 79
End: 2019-09-20

## 2019-09-20 PROCEDURE — 77386 HC IMRT COMPLEX: CPT | Performed by: RADIOLOGY

## 2019-09-20 NOTE — TELEPHONE ENCOUNTER
Pt arrived at  for RT requesting to be seen by Samanta Garces. On Xeloda/concurrent RT. This RN evaluated pt who reported severe nausea w/o vomiting, epigastric pain et \"fullness\", as well as diplopia while driving.   States that she was seeing two

## 2019-09-23 ENCOUNTER — APPOINTMENT (OUTPATIENT)
Dept: GENERAL RADIOLOGY | Facility: HOSPITAL | Age: 79
End: 2019-09-23
Attending: EMERGENCY MEDICINE
Payer: MEDICARE

## 2019-09-23 ENCOUNTER — TELEPHONE (OUTPATIENT)
Dept: RADIATION ONCOLOGY | Facility: HOSPITAL | Age: 79
End: 2019-09-23

## 2019-09-23 ENCOUNTER — HOSPITAL ENCOUNTER (EMERGENCY)
Facility: HOSPITAL | Age: 79
Discharge: HOME | End: 2019-09-23
Attending: EMERGENCY MEDICINE
Payer: MEDICARE

## 2019-09-23 VITALS
OXYGEN SATURATION: 98 % | TEMPERATURE: 97 F | DIASTOLIC BLOOD PRESSURE: 55 MMHG | HEIGHT: 59 IN | SYSTOLIC BLOOD PRESSURE: 117 MMHG | WEIGHT: 141 LBS | HEART RATE: 48 BPM | RESPIRATION RATE: 12 BRPM | BODY MASS INDEX: 28.43 KG/M2

## 2019-09-23 DIAGNOSIS — R53.83 FATIGUE, UNSPECIFIED TYPE: Primary | ICD-10-CM

## 2019-09-23 LAB
ALBUMIN SERPL-MCNC: 3.6 G/DL (ref 3.4–5)
ALP LIVER SERPL-CCNC: 83 U/L (ref 55–142)
ALT SERPL-CCNC: 16 U/L (ref 13–56)
ANION GAP SERPL CALC-SCNC: 8 MMOL/L (ref 0–18)
ANTIBODY SCREEN: NEGATIVE
AST SERPL-CCNC: 13 U/L (ref 15–37)
BASOPHILS # BLD AUTO: 0.03 X10(3) UL (ref 0–0.2)
BASOPHILS NFR BLD AUTO: 0.7 %
BILIRUB DIRECT SERPL-MCNC: <0.1 MG/DL (ref 0–0.2)
BILIRUB SERPL-MCNC: 0.3 MG/DL (ref 0.1–2)
BILIRUB UR QL: NEGATIVE
BUN BLD-MCNC: 30 MG/DL (ref 7–18)
BUN/CREAT SERPL: 23.1 (ref 10–20)
CALCIUM BLD-MCNC: 8.6 MG/DL (ref 8.5–10.1)
CHLORIDE SERPL-SCNC: 108 MMOL/L (ref 98–112)
CLARITY UR: CLEAR
CO2 SERPL-SCNC: 23 MMOL/L (ref 21–32)
COLOR UR: COLORLESS
CREAT BLD-MCNC: 1.3 MG/DL (ref 0.55–1.02)
DEPRECATED RDW RBC AUTO: 52.6 FL (ref 35.1–46.3)
EOSINOPHIL # BLD AUTO: 0.26 X10(3) UL (ref 0–0.7)
EOSINOPHIL NFR BLD AUTO: 5.7 %
ERYTHROCYTE [DISTWIDTH] IN BLOOD BY AUTOMATED COUNT: 15.3 % (ref 11–15)
GLUCOSE BLD-MCNC: 120 MG/DL (ref 70–99)
GLUCOSE UR-MCNC: NEGATIVE MG/DL
HAV IGM SER QL: 1.3 MG/DL (ref 1.6–2.6)
HCT VFR BLD AUTO: 27.3 % (ref 35–48)
HGB BLD-MCNC: 9.3 G/DL (ref 12–16)
HGB UR QL STRIP.AUTO: NEGATIVE
IMM GRANULOCYTES # BLD AUTO: 0.03 X10(3) UL (ref 0–1)
IMM GRANULOCYTES NFR BLD: 0.7 %
KETONES UR-MCNC: NEGATIVE MG/DL
LEUKOCYTE ESTERASE UR QL STRIP.AUTO: NEGATIVE
LYMPHOCYTES # BLD AUTO: 1.23 X10(3) UL (ref 1–4)
LYMPHOCYTES NFR BLD AUTO: 26.7 %
M PROTEIN MFR SERPL ELPH: 7.2 G/DL (ref 6.4–8.2)
MCH RBC QN AUTO: 32.7 PG (ref 26–34)
MCHC RBC AUTO-ENTMCNC: 34.1 G/DL (ref 31–37)
MCV RBC AUTO: 96.1 FL (ref 80–100)
MONOCYTES # BLD AUTO: 0.45 X10(3) UL (ref 0.1–1)
MONOCYTES NFR BLD AUTO: 9.8 %
NEUTROPHILS # BLD AUTO: 2.6 X10 (3) UL (ref 1.5–7.7)
NEUTROPHILS # BLD AUTO: 2.6 X10(3) UL (ref 1.5–7.7)
NEUTROPHILS NFR BLD AUTO: 56.4 %
NITRITE UR QL STRIP.AUTO: NEGATIVE
OSMOLALITY SERPL CALC.SUM OF ELEC: 295 MOSM/KG (ref 275–295)
PH UR: 6 [PH] (ref 5–8)
PLATELET # BLD AUTO: 161 10(3)UL (ref 150–450)
POTASSIUM SERPL-SCNC: 3.2 MMOL/L (ref 3.5–5.1)
PROT UR-MCNC: NEGATIVE MG/DL
RBC # BLD AUTO: 2.84 X10(6)UL (ref 3.8–5.3)
RH BLOOD TYPE: POSITIVE
SODIUM SERPL-SCNC: 139 MMOL/L (ref 136–145)
SP GR UR STRIP: 1 (ref 1–1.03)
UROBILINOGEN UR STRIP-ACNC: <2
WBC # BLD AUTO: 4.6 X10(3) UL (ref 4–11)

## 2019-09-23 PROCEDURE — 86900 BLOOD TYPING SEROLOGIC ABO: CPT | Performed by: EMERGENCY MEDICINE

## 2019-09-23 PROCEDURE — 71045 X-RAY EXAM CHEST 1 VIEW: CPT | Performed by: EMERGENCY MEDICINE

## 2019-09-23 PROCEDURE — 80076 HEPATIC FUNCTION PANEL: CPT | Performed by: EMERGENCY MEDICINE

## 2019-09-23 PROCEDURE — 81003 URINALYSIS AUTO W/O SCOPE: CPT | Performed by: EMERGENCY MEDICINE

## 2019-09-23 PROCEDURE — 86850 RBC ANTIBODY SCREEN: CPT | Performed by: EMERGENCY MEDICINE

## 2019-09-23 PROCEDURE — 86901 BLOOD TYPING SEROLOGIC RH(D): CPT | Performed by: EMERGENCY MEDICINE

## 2019-09-23 PROCEDURE — 96360 HYDRATION IV INFUSION INIT: CPT

## 2019-09-23 PROCEDURE — 93010 ELECTROCARDIOGRAM REPORT: CPT | Performed by: EMERGENCY MEDICINE

## 2019-09-23 PROCEDURE — 80048 BASIC METABOLIC PNL TOTAL CA: CPT | Performed by: EMERGENCY MEDICINE

## 2019-09-23 PROCEDURE — 77386 HC IMRT COMPLEX: CPT | Performed by: RADIOLOGY

## 2019-09-23 PROCEDURE — 99284 EMERGENCY DEPT VISIT MOD MDM: CPT

## 2019-09-23 PROCEDURE — 83735 ASSAY OF MAGNESIUM: CPT | Performed by: EMERGENCY MEDICINE

## 2019-09-23 PROCEDURE — 93005 ELECTROCARDIOGRAM TRACING: CPT

## 2019-09-23 PROCEDURE — 85025 COMPLETE CBC W/AUTO DIFF WBC: CPT | Performed by: EMERGENCY MEDICINE

## 2019-09-23 RX ORDER — MAGNESIUM OXIDE 400 MG (241.3 MG MAGNESIUM) TABLET
200 TABLET ONCE
Status: COMPLETED | OUTPATIENT
Start: 2019-09-23 | End: 2019-09-23

## 2019-09-23 RX ORDER — POTASSIUM CHLORIDE 20 MEQ/1
40 TABLET, EXTENDED RELEASE ORAL ONCE
Status: COMPLETED | OUTPATIENT
Start: 2019-09-23 | End: 2019-09-23

## 2019-09-23 NOTE — ED PROVIDER NOTES
Patient Seen in: [facility] Emergency Department      History   Patient presents with:  Weakness    Stated Complaint: lethargy, dizziness on friday, receiving XRT for colon CA    HPI    40-year-old female with history of colon cancer status post r Never Used    Alcohol use: Yes    Drug use: No             Review of Systems    Positive for stated complaint: lethargy, dizziness on friday, receiving XRT for colon CA  Other systems are as noted in HPI. Constitutional and vital signs reviewed.       All Abnormal; Notable for the following components:    AST 13 (*)     All other components within normal limits   MAGNESIUM - Abnormal; Notable for the following components:    Magnesium 1.3 (*)     All other components within normal limits   CBC W/ DIFFERENTI 9/23/2019 at 11:01                  [facility]     51-year-old female presenting for evaluation of generalized weakness and fatigue. Overall she is well-appearing and neurologically intact.   Plan to check electrolytes, CBC, urinalysis and chest x-ray to evaluate

## 2019-09-23 NOTE — TELEPHONE ENCOUNTER
Pt came in with continued c/o nausea, epigastric pain and fullness, and minimal dizziness agrees to go to ER post RT tx.  ER triage and  aware pt to brought via w/c.
02-Aug-2017

## 2019-09-23 NOTE — ED INITIAL ASSESSMENT (HPI)
Patient sent from Cancer center for evaluation of weakness and dizziness. Apparently she had an episode of blurred vision and dizziness on Friday before receiving radiation.  She reports when she feels like this her blood count is usually low    Patient sta

## 2019-09-24 ENCOUNTER — TELEPHONE (OUTPATIENT)
Dept: HEMATOLOGY/ONCOLOGY | Facility: HOSPITAL | Age: 79
End: 2019-09-24

## 2019-09-24 ENCOUNTER — OFFICE VISIT (OUTPATIENT)
Dept: RADIATION ONCOLOGY | Facility: HOSPITAL | Age: 79
End: 2019-09-24
Attending: RADIOLOGY
Payer: MEDICARE

## 2019-09-24 ENCOUNTER — APPOINTMENT (OUTPATIENT)
Dept: HEMATOLOGY/ONCOLOGY | Facility: HOSPITAL | Age: 79
End: 2019-09-24
Attending: INTERNAL MEDICINE
Payer: MEDICARE

## 2019-09-24 ENCOUNTER — TELEPHONE (OUTPATIENT)
Dept: RADIATION ONCOLOGY | Facility: HOSPITAL | Age: 79
End: 2019-09-24

## 2019-09-24 VITALS
SYSTOLIC BLOOD PRESSURE: 137 MMHG | RESPIRATION RATE: 18 BRPM | TEMPERATURE: 98 F | DIASTOLIC BLOOD PRESSURE: 60 MMHG | HEART RATE: 66 BPM | OXYGEN SATURATION: 100 %

## 2019-09-24 DIAGNOSIS — C20 RECTAL CANCER (HCC): Primary | ICD-10-CM

## 2019-09-24 PROCEDURE — 77386 HC IMRT COMPLEX: CPT | Performed by: RADIOLOGY

## 2019-09-24 NOTE — PROGRESS NOTES
[facility] Radiation Treatment Management Note 6-10    Patient:  Monserrat Cohen  Age:  66year old  Visit Diagnosis:    1.  Rectal cancer [facility])      Primary Rad/Onc:  Dr. Cecilia Kim    Site Delivered Dose (Gy) Prescribed Dose (

## 2019-09-24 NOTE — TELEPHONE ENCOUNTER
Tacho Naidu returned my call. She took one lomotil when she got home. I reviewed that she may have up to 8 in 24 hrs. She said she has not had another BM since getting home. She will follow the BRAT diet & push caffeine free fluids/gatorade.   She will call i

## 2019-09-24 NOTE — TELEPHONE ENCOUNTER
Pt seen for OTV with Dr Fredrick Lee before RT treatment today. Dr Ingris Caputo nurse notified patient having frequent stools. I attempted to reach the patient. No answer on her cell or home number.  I left a  msg asking her to please return my call so I may do a

## 2019-09-24 NOTE — TELEPHONE ENCOUNTER
MSG left for Vonzell Najjar RN per Dr. Iona Burgos please have Dr. Sonido Vargas office check in with pt due to diarrhea probably xeloda induced she has only had 5 radiation txs. This morning has had 4 diarrhea episodes.

## 2019-09-25 ENCOUNTER — TELEPHONE (OUTPATIENT)
Dept: HEMATOLOGY/ONCOLOGY | Facility: HOSPITAL | Age: 79
End: 2019-09-25

## 2019-09-25 PROCEDURE — 77386 HC IMRT COMPLEX: CPT | Performed by: RADIOLOGY

## 2019-09-25 NOTE — TELEPHONE ENCOUNTER
Spoke with Dain this morning, he said that his mom had bladder cancer 10 years ago and last saw a urologist (Dr Berlin Barkley from [place]) a few years ago. He asked if Dr Raúl Huang recommends a scan of her bladder.   Spoke with Dr Raúl Huang and called patient back, u

## 2019-09-26 ENCOUNTER — DIETICIAN VISIT (OUTPATIENT)
Dept: NUTRITION | Facility: HOSPITAL | Age: 79
End: 2019-09-26

## 2019-09-26 VITALS — BODY MASS INDEX: 29 KG/M2 | WEIGHT: 144.19 LBS

## 2019-09-26 PROCEDURE — 77386 HC IMRT COMPLEX: CPT | Performed by: RADIOLOGY

## 2019-09-26 NOTE — PROGRESS NOTES
Oncology Nutrition Assessment    Ht Readings from Last 1 Encounters:  09/23/19 : 149.9 cm (4' 11\")      Wt Readings from Last 1 Encounters:  09/26/19 : 65.4 kg (144 lb 3.2 oz)    BMI Calculated: Body mass index is 29.12 kg/m². Weight History:   Wt Readin maintain weight within 5%, use of nutritional supplements and aid in management of treatment side effects via dietary measures  Handouts Provided:  Nutriton Therapy for Diarrhea     Weekly Visits:  Nutritional Interventions:   9/19/19 Discussed importance

## 2019-09-27 PROCEDURE — 77386 HC IMRT COMPLEX: CPT | Performed by: RADIOLOGY

## 2019-09-27 PROCEDURE — 77336 RADIATION PHYSICS CONSULT: CPT | Performed by: RADIOLOGY

## 2019-09-30 ENCOUNTER — ORDER TRANSCRIPTION (OUTPATIENT)
Dept: PHYSICAL THERAPY | Facility: HOSPITAL | Age: 79
End: 2019-09-30

## 2019-09-30 DIAGNOSIS — R26.89 BALANCE PROBLEM: Primary | ICD-10-CM

## 2019-09-30 PROCEDURE — 77386 HC IMRT COMPLEX: CPT | Performed by: RADIOLOGY

## 2019-10-01 ENCOUNTER — OFFICE VISIT (OUTPATIENT)
Dept: HEMATOLOGY/ONCOLOGY | Facility: HOSPITAL | Age: 79
End: 2019-10-01
Attending: RADIOLOGY
Payer: MEDICARE

## 2019-10-01 ENCOUNTER — OFFICE VISIT (OUTPATIENT)
Dept: RADIATION ONCOLOGY | Facility: HOSPITAL | Age: 79
End: 2019-10-01
Attending: RADIOLOGY
Payer: MEDICARE

## 2019-10-01 ENCOUNTER — TELEPHONE (OUTPATIENT)
Dept: HEMATOLOGY/ONCOLOGY | Facility: HOSPITAL | Age: 79
End: 2019-10-01

## 2019-10-01 VITALS
SYSTOLIC BLOOD PRESSURE: 106 MMHG | TEMPERATURE: 98 F | BODY MASS INDEX: 28.91 KG/M2 | HEART RATE: 54 BPM | DIASTOLIC BLOOD PRESSURE: 43 MMHG | HEIGHT: 59 IN | RESPIRATION RATE: 18 BRPM | WEIGHT: 143.38 LBS

## 2019-10-01 DIAGNOSIS — E87.6 HYPOKALEMIA: ICD-10-CM

## 2019-10-01 DIAGNOSIS — C20 RECTAL CANCER (HCC): Primary | ICD-10-CM

## 2019-10-01 DIAGNOSIS — C56.9 MALIGNANT NEOPLASM OF OVARY, UNSPECIFIED LATERALITY (HCC): ICD-10-CM

## 2019-10-01 DIAGNOSIS — Z51.11 CHEMOTHERAPY MANAGEMENT, ENCOUNTER FOR: ICD-10-CM

## 2019-10-01 DIAGNOSIS — C20 RECTAL CANCER (HCC): ICD-10-CM

## 2019-10-01 DIAGNOSIS — D50.8 OTHER IRON DEFICIENCY ANEMIA: ICD-10-CM

## 2019-10-01 PROCEDURE — 80053 COMPREHEN METABOLIC PANEL: CPT

## 2019-10-01 PROCEDURE — 36415 COLL VENOUS BLD VENIPUNCTURE: CPT

## 2019-10-01 PROCEDURE — 99215 OFFICE O/P EST HI 40 MIN: CPT | Performed by: INTERNAL MEDICINE

## 2019-10-01 PROCEDURE — 83735 ASSAY OF MAGNESIUM: CPT

## 2019-10-01 PROCEDURE — 77386 HC IMRT COMPLEX: CPT | Performed by: RADIOLOGY

## 2019-10-01 PROCEDURE — 85025 COMPLETE CBC W/AUTO DIFF WBC: CPT

## 2019-10-01 NOTE — PROGRESS NOTES
Cancer Center Progress Note    Patient Name: Frannie Mix   YOB: 1940   Medical Record Number: [id number]   Attending Physician: Cedric Bazzi M.D.      Chief Complaint:  Rectal cancer, ovarian cancer    History of Present Illness:  Fabiola slowly improving.       Performance Status:  ECOG 0  Past Medical History:  Past Medical History:   Diagnosis Date   • Anxiety state, unspecified    • Bladder cancer ([facility] [address]    • Depression    • MGUS (monoclonal gammopathy of unknown significance)    • Ovaria Active member of club or organization: Not on file        Attends meetings of clubs or organizations: Not on file        Relationship status: Not on file      Intimate partner violence:        Fear of current or ex partner: Not on file        Emotionally a mouth 4 (four) times daily as needed for Diarrhea., Disp: , Rfl:   •  simvastatin (ZOCOR) 20 MG Oral Tab, Take 20 mg by mouth daily. , Disp: , Rfl:   •  Sertraline HCl (ZOLOFT) 50 MG Oral Tab, Take 100 mg by mouth daily.   , Disp: , Rfl:   •  Pantoprazole Radiology:  none    Cancer Staging  No matching staging information was found for the patient.     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

## 2019-10-01 NOTE — PROGRESS NOTES
[facility] Radiation Treatment Management Note 11-15    Patient:  Lorena Patton  Age:  66year old  Visit Diagnosis:    1.  Rectal cancer [facility])      Primary Rad/Onc:  Dr. Garza Big    Site Delivered Dose (Gy) Prescribed Dose

## 2019-10-02 ENCOUNTER — TELEPHONE (OUTPATIENT)
Dept: RADIATION ONCOLOGY | Facility: HOSPITAL | Age: 79
End: 2019-10-02

## 2019-10-03 ENCOUNTER — OFFICE VISIT (OUTPATIENT)
Dept: HEMATOLOGY/ONCOLOGY | Facility: HOSPITAL | Age: 79
End: 2019-10-03
Attending: RADIOLOGY
Payer: MEDICARE

## 2019-10-03 VITALS
TEMPERATURE: 98 F | OXYGEN SATURATION: 99 % | SYSTOLIC BLOOD PRESSURE: 128 MMHG | HEART RATE: 63 BPM | DIASTOLIC BLOOD PRESSURE: 43 MMHG | RESPIRATION RATE: 18 BRPM

## 2019-10-03 DIAGNOSIS — E87.6 HYPOKALEMIA: Primary | ICD-10-CM

## 2019-10-03 DIAGNOSIS — D50.8 OTHER IRON DEFICIENCY ANEMIA: ICD-10-CM

## 2019-10-03 PROCEDURE — 96365 THER/PROPH/DIAG IV INF INIT: CPT

## 2019-10-03 PROCEDURE — 96366 THER/PROPH/DIAG IV INF ADDON: CPT

## 2019-10-03 PROCEDURE — 77386 HC IMRT COMPLEX: CPT | Performed by: RADIOLOGY

## 2019-10-03 NOTE — PROGRESS NOTES
Nicki Jade presents for magnesium and potasium infusion for lab values drawn on 10/1 of magnesium 0.8 and potassium 2.9  Feeling fatigue and overall weakness. No chest pain. Does have diarrhea at times.     PIV established with brisk blood return noted, infus

## 2019-10-04 PROCEDURE — 77386 HC IMRT COMPLEX: CPT | Performed by: RADIOLOGY

## 2019-10-04 PROCEDURE — 77336 RADIATION PHYSICS CONSULT: CPT | Performed by: RADIOLOGY

## 2019-10-07 PROCEDURE — 77386 HC IMRT COMPLEX: CPT | Performed by: RADIOLOGY

## 2019-10-08 ENCOUNTER — OFFICE VISIT (OUTPATIENT)
Dept: RADIATION ONCOLOGY | Facility: HOSPITAL | Age: 79
End: 2019-10-08
Attending: RADIOLOGY
Payer: MEDICARE

## 2019-10-08 ENCOUNTER — TELEPHONE (OUTPATIENT)
Dept: HEMATOLOGY/ONCOLOGY | Facility: HOSPITAL | Age: 79
End: 2019-10-08

## 2019-10-08 ENCOUNTER — OFFICE VISIT (OUTPATIENT)
Dept: HEMATOLOGY/ONCOLOGY | Facility: HOSPITAL | Age: 79
End: 2019-10-08
Attending: RADIOLOGY
Payer: MEDICARE

## 2019-10-08 VITALS
HEART RATE: 54 BPM | DIASTOLIC BLOOD PRESSURE: 61 MMHG | WEIGHT: 143 LBS | TEMPERATURE: 98 F | SYSTOLIC BLOOD PRESSURE: 112 MMHG | HEIGHT: 59 IN | RESPIRATION RATE: 18 BRPM | BODY MASS INDEX: 28.83 KG/M2

## 2019-10-08 VITALS
BODY MASS INDEX: 28.83 KG/M2 | WEIGHT: 143 LBS | HEIGHT: 59 IN | RESPIRATION RATE: 18 BRPM | SYSTOLIC BLOOD PRESSURE: 112 MMHG | DIASTOLIC BLOOD PRESSURE: 61 MMHG | HEART RATE: 54 BPM | TEMPERATURE: 98 F

## 2019-10-08 DIAGNOSIS — R19.7 DIARRHEA DUE TO MALABSORPTION: ICD-10-CM

## 2019-10-08 DIAGNOSIS — C20 RECTAL CANCER (HCC): ICD-10-CM

## 2019-10-08 DIAGNOSIS — C20 RECTAL CANCER (HCC): Primary | ICD-10-CM

## 2019-10-08 DIAGNOSIS — E83.42 HYPOMAGNESEMIA: ICD-10-CM

## 2019-10-08 DIAGNOSIS — K90.9 DIARRHEA DUE TO MALABSORPTION: ICD-10-CM

## 2019-10-08 DIAGNOSIS — E87.6 HYPOKALEMIA: ICD-10-CM

## 2019-10-08 DIAGNOSIS — E83.42 HYPOMAGNESEMIA: Primary | ICD-10-CM

## 2019-10-08 PROCEDURE — 83735 ASSAY OF MAGNESIUM: CPT

## 2019-10-08 PROCEDURE — 85025 COMPLETE CBC W/AUTO DIFF WBC: CPT

## 2019-10-08 PROCEDURE — 80053 COMPREHEN METABOLIC PANEL: CPT

## 2019-10-08 PROCEDURE — 36415 COLL VENOUS BLD VENIPUNCTURE: CPT

## 2019-10-08 PROCEDURE — 96366 THER/PROPH/DIAG IV INF ADDON: CPT

## 2019-10-08 PROCEDURE — 99214 OFFICE O/P EST MOD 30 MIN: CPT | Performed by: PHYSICIAN ASSISTANT

## 2019-10-08 PROCEDURE — 96365 THER/PROPH/DIAG IV INF INIT: CPT

## 2019-10-08 PROCEDURE — 77386 HC IMRT COMPLEX: CPT | Performed by: RADIOLOGY

## 2019-10-08 RX ORDER — POTASSIUM CHLORIDE 750 MG/1
20 TABLET, EXTENDED RELEASE ORAL 2 TIMES DAILY
Qty: 120 TABLET | Refills: 2 | Status: SHIPPED | OUTPATIENT
Start: 2019-10-08 | End: 2020-01-31

## 2019-10-08 RX ORDER — DIPHENOXYLATE HYDROCHLORIDE AND ATROPINE SULFATE 2.5; .025 MG/1; MG/1
1-2 TABLET ORAL 4 TIMES DAILY PRN
Qty: 100 TABLET | Refills: 0 | Status: SHIPPED | OUTPATIENT
Start: 2019-10-08 | End: 2019-11-26

## 2019-10-08 NOTE — PROGRESS NOTES
Pt added on for hypokalemia/hypomagnesemia/diarrhea. Pt reports fatigue from being up early this morning using the restroom. Pt rested while here. Discharged from infusion, ambulating independently w/ steady gait.

## 2019-10-08 NOTE — PROGRESS NOTES
[facility] Radiation Treatment Management Note 11-15    Patient:  Lorena Patton  Age:  66year old  Visit Diagnosis:    1.  Rectal cancer [facility])      Primary Rad/Onc:  Dr. Garza Big    Site Delivered Dose (Gy) Prescribed Dose radiotherapy per plan    Next visit:  1 week    Dr. Rogene Runner

## 2019-10-08 NOTE — PROGRESS NOTES
Cancer Center Progress Note    Patient Name: Monserrat Cohen   YOB: 1940   Medical Record Number: [id number]   Attending Physician: Alvarado Ram M.D.      Chief Complaint:  Rectal cancer, ovarian cancer    History of Present Illness:  Fabiola denies mouth sores, nausea/vomiting, dysuria, decreased urine output and rash. She does report intermittent muscle cramps in both calves and fatigue, although her fatigue improved after IV K+ and Mg2+ replacement on 10/3/19.        She is using 2 tablets o Substance and Sexual Activity      Alcohol use: Yes      Drug use: No      Sexual activity: Not on file    Lifestyle      Physical activity:        Days per week: Not on file        Minutes per session: Not on file      Stress: Not on file    Relationships MAGNESIUM OR, Take by mouth daily. , Disp: , Rfl:   •  ondansetron 8 MG Oral Tablet Dispersible, Take 8 mg by mouth every 8 (eight) hours as needed for Nausea., Disp: , Rfl:   •  ALPRAZolam 0.25 MG Oral Tab, TAKE 1 TABLET BY MOUTH EVERY DAY AS NEEDED, Disp: GFRAA 59 (L) 10/08/2019    CA 7.9 (L) 10/08/2019    OSMOCALC 290 10/08/2019    ALKPHO 69 10/08/2019    AST 11 (L) 10/08/2019    ALT 17 10/08/2019    BILT 0.3 10/08/2019    TP 6.7 10/08/2019    ALB 3.4 10/08/2019    GLOBULIN 3.3 10/08/2019     10/08/2

## 2019-10-09 PROCEDURE — 77386 HC IMRT COMPLEX: CPT | Performed by: RADIOLOGY

## 2019-10-10 ENCOUNTER — DIETICIAN VISIT (OUTPATIENT)
Dept: NUTRITION | Facility: HOSPITAL | Age: 79
End: 2019-10-10

## 2019-10-10 VITALS — BODY MASS INDEX: 29 KG/M2 | WEIGHT: 144.63 LBS

## 2019-10-10 PROCEDURE — 77386 HC IMRT COMPLEX: CPT | Performed by: RADIOLOGY

## 2019-10-10 NOTE — PROGRESS NOTES
Oncology Nutrition Assessment    Ht Readings from Last 1 Encounters:  10/08/19 : 149.9 cm (4' 11\")      Wt Readings from Last 1 Encounters:  10/10/19 : 65.6 kg (144 lb 9.6 oz)    BMI Calculated: Body mass index is 29.21 kg/m². Weight History:   Wt Readin maintain weight within 5%, use of nutritional supplements and aid in management of treatment side effects via dietary measures  Handouts Provided:  Nutriton Therapy for Diarrhea     Weekly Visits:  Nutritional Interventions:   9/19/19 Discussed importance

## 2019-10-11 PROCEDURE — 77386 HC IMRT COMPLEX: CPT | Performed by: RADIOLOGY

## 2019-10-11 PROCEDURE — 77336 RADIATION PHYSICS CONSULT: CPT | Performed by: RADIOLOGY

## 2019-10-14 PROCEDURE — 77338 DESIGN MLC DEVICE FOR IMRT: CPT | Performed by: RADIOLOGY

## 2019-10-14 PROCEDURE — 77386 HC IMRT COMPLEX: CPT | Performed by: RADIOLOGY

## 2019-10-14 PROCEDURE — 77300 RADIATION THERAPY DOSE PLAN: CPT | Performed by: RADIOLOGY

## 2019-10-15 ENCOUNTER — OFFICE VISIT (OUTPATIENT)
Dept: RADIATION ONCOLOGY | Facility: HOSPITAL | Age: 79
End: 2019-10-15
Attending: RADIOLOGY
Payer: MEDICARE

## 2019-10-15 VITALS
BODY MASS INDEX: 28.3 KG/M2 | DIASTOLIC BLOOD PRESSURE: 69 MMHG | HEART RATE: 61 BPM | WEIGHT: 140.38 LBS | SYSTOLIC BLOOD PRESSURE: 119 MMHG | HEIGHT: 59 IN | RESPIRATION RATE: 18 BRPM | TEMPERATURE: 98 F

## 2019-10-15 DIAGNOSIS — C20 RECTAL CANCER (HCC): Primary | ICD-10-CM

## 2019-10-15 PROCEDURE — 77386 HC IMRT COMPLEX: CPT | Performed by: RADIOLOGY

## 2019-10-15 NOTE — PROGRESS NOTES
[facility] Radiation Treatment Management Note 16-20    Patient:  Richi Aguillon  Age:  66year old  Visit Diagnosis:    1.  Rectal cancer [facility])      Primary Rad/Onc:  Dr. Bethel Gant    Site Delivered Dose (Gy) Prescribed Dose

## 2019-10-16 ENCOUNTER — APPOINTMENT (OUTPATIENT)
Dept: HEMATOLOGY/ONCOLOGY | Facility: HOSPITAL | Age: 79
End: 2019-10-16
Attending: RADIOLOGY
Payer: MEDICARE

## 2019-10-16 VITALS
HEART RATE: 74 BPM | WEIGHT: 140.19 LBS | RESPIRATION RATE: 18 BRPM | HEIGHT: 59 IN | SYSTOLIC BLOOD PRESSURE: 122 MMHG | TEMPERATURE: 98 F | DIASTOLIC BLOOD PRESSURE: 43 MMHG | OXYGEN SATURATION: 97 % | BODY MASS INDEX: 28.26 KG/M2

## 2019-10-16 DIAGNOSIS — E87.6 HYPOKALEMIA: ICD-10-CM

## 2019-10-16 DIAGNOSIS — Z51.11 CHEMOTHERAPY MANAGEMENT, ENCOUNTER FOR: ICD-10-CM

## 2019-10-16 DIAGNOSIS — C20 RECTAL CANCER (HCC): ICD-10-CM

## 2019-10-16 DIAGNOSIS — E83.42 HYPOMAGNESEMIA: ICD-10-CM

## 2019-10-16 DIAGNOSIS — E83.42 HYPOMAGNESEMIA: Primary | ICD-10-CM

## 2019-10-16 DIAGNOSIS — C56.9 MALIGNANT NEOPLASM OF OVARY, UNSPECIFIED LATERALITY (HCC): ICD-10-CM

## 2019-10-16 PROCEDURE — 80053 COMPREHEN METABOLIC PANEL: CPT

## 2019-10-16 PROCEDURE — 36415 COLL VENOUS BLD VENIPUNCTURE: CPT

## 2019-10-16 PROCEDURE — 99215 OFFICE O/P EST HI 40 MIN: CPT | Performed by: INTERNAL MEDICINE

## 2019-10-16 PROCEDURE — 83735 ASSAY OF MAGNESIUM: CPT

## 2019-10-16 PROCEDURE — 85025 COMPLETE CBC W/AUTO DIFF WBC: CPT

## 2019-10-16 PROCEDURE — 77386 HC IMRT COMPLEX: CPT | Performed by: RADIOLOGY

## 2019-10-16 NOTE — PROGRESS NOTES
Cancer Center Progress Note    Patient Name: Joy Justice   YOB: 1940   Medical Record Number: [id number]   Attending Physician: Quinn Hess M.D.      Chief Complaint:  Rectal cancer, ovarian cancer    History of Present Illness:  Fabiola slowly improving.       Performance Status:  ECOG 0  Past Medical History:  Past Medical History:   Diagnosis Date   • Anxiety state, unspecified    • Bladder cancer ([facility] [address]    • Depression    • MGUS (monoclonal gammopathy of unknown significance)    • Ovaria Active member of club or organization: Not on file        Attends meetings of clubs or organizations: Not on file        Relationship status: Not on file      Intimate partner violence:        Fear of current or ex partner: Not on file        Emotionally a daily.  , Disp: , Rfl:   •  Levothyroxine Sodium 137 MCG Oral Tab, Take 137 mcg by mouth before breakfast.  , Disp: , Rfl:   •  simvastatin (ZOCOR) 20 MG Oral Tab, Take 20 mg by mouth daily.   , Disp: , Rfl:   •  Sertraline HCl (ZOLOFT) 50 MG Oral Tab, Take 10/16/2019    ALT 15 10/16/2019    BILT 0.3 10/16/2019    TP 6.5 10/16/2019    ALB 3.4 10/16/2019    GLOBULIN 3.1 10/16/2019     10/16/2019    K 3.1 (L) 10/16/2019     10/16/2019    CO2 20.0 (L) 10/16/2019       Radiology:  none    Cancer Stagi

## 2019-10-17 ENCOUNTER — OFFICE VISIT (OUTPATIENT)
Dept: HEMATOLOGY/ONCOLOGY | Facility: HOSPITAL | Age: 79
End: 2019-10-17
Attending: RADIOLOGY
Payer: MEDICARE

## 2019-10-17 ENCOUNTER — DIETICIAN VISIT (OUTPATIENT)
Dept: NUTRITION | Facility: HOSPITAL | Age: 79
End: 2019-10-17

## 2019-10-17 VITALS — WEIGHT: 139.63 LBS | BODY MASS INDEX: 28 KG/M2

## 2019-10-17 VITALS
TEMPERATURE: 99 F | RESPIRATION RATE: 16 BRPM | SYSTOLIC BLOOD PRESSURE: 117 MMHG | HEART RATE: 67 BPM | DIASTOLIC BLOOD PRESSURE: 46 MMHG

## 2019-10-17 DIAGNOSIS — D50.8 OTHER IRON DEFICIENCY ANEMIA: ICD-10-CM

## 2019-10-17 DIAGNOSIS — K90.9 DIARRHEA DUE TO MALABSORPTION: ICD-10-CM

## 2019-10-17 DIAGNOSIS — E87.6 HYPOKALEMIA: ICD-10-CM

## 2019-10-17 DIAGNOSIS — R19.7 DIARRHEA DUE TO MALABSORPTION: ICD-10-CM

## 2019-10-17 DIAGNOSIS — E83.42 HYPOMAGNESEMIA: Primary | ICD-10-CM

## 2019-10-17 PROCEDURE — 96366 THER/PROPH/DIAG IV INF ADDON: CPT

## 2019-10-17 PROCEDURE — 77386 HC IMRT COMPLEX: CPT | Performed by: RADIOLOGY

## 2019-10-17 PROCEDURE — 96365 THER/PROPH/DIAG IV INF INIT: CPT

## 2019-10-17 NOTE — PROGRESS NOTES
Oncology Nutrition Assessment    Ht Readings from Last 1 Encounters:  10/16/19 : 149.9 cm (4' 11\")      Wt Readings from Last 1 Encounters:  10/17/19 : 63.3 kg (139 lb 9.6 oz)    BMI Calculated: Body mass index is 28.2 kg/m². Weight History:   Wt Reading maintain weight within 5%, use of nutritional supplements and aid in management of treatment side effects via dietary measures  Handouts Provided:  Nutriton Therapy for Diarrhea     Weekly Visits:  Nutritional Interventions:   9/19/19 Discussed importance as with gatorade. Did not try rehydration recipes as above. Appetite only fair, but forcing good intake with at least 2 supplements per day. Moderate to severe fatigue. Anxiously awaiting treatment completion next week.   TIAGO Mullins RD, LDN

## 2019-10-17 NOTE — PROGRESS NOTES
Jayla Aman to infusion today for a magnesium rider for lab value of 1.2 on 10/16/19. She arrives alert and independent. Complains of being very tired. Still having diarrhea, in which she states has slowed a little bit.      PIV established to left AC, x1 atte

## 2019-10-18 PROCEDURE — 77336 RADIATION PHYSICS CONSULT: CPT | Performed by: RADIOLOGY

## 2019-10-18 PROCEDURE — 77386 HC IMRT COMPLEX: CPT | Performed by: RADIOLOGY

## 2019-10-21 PROCEDURE — 77386 HC IMRT COMPLEX: CPT | Performed by: RADIOLOGY

## 2019-10-22 ENCOUNTER — OFFICE VISIT (OUTPATIENT)
Dept: RADIATION ONCOLOGY | Facility: HOSPITAL | Age: 79
End: 2019-10-22
Attending: RADIOLOGY
Payer: MEDICARE

## 2019-10-22 ENCOUNTER — OFFICE VISIT (OUTPATIENT)
Dept: HEMATOLOGY/ONCOLOGY | Facility: HOSPITAL | Age: 79
End: 2019-10-22
Attending: RADIOLOGY
Payer: MEDICARE

## 2019-10-22 VITALS
DIASTOLIC BLOOD PRESSURE: 69 MMHG | SYSTOLIC BLOOD PRESSURE: 115 MMHG | RESPIRATION RATE: 16 BRPM | TEMPERATURE: 98 F | HEART RATE: 72 BPM | BODY MASS INDEX: 27.49 KG/M2 | WEIGHT: 136.38 LBS | HEIGHT: 59 IN

## 2019-10-22 DIAGNOSIS — K90.9 DIARRHEA DUE TO MALABSORPTION: ICD-10-CM

## 2019-10-22 DIAGNOSIS — E83.42 HYPOMAGNESEMIA: ICD-10-CM

## 2019-10-22 DIAGNOSIS — C20 RECTAL CANCER (HCC): Primary | ICD-10-CM

## 2019-10-22 DIAGNOSIS — R19.7 DIARRHEA DUE TO MALABSORPTION: ICD-10-CM

## 2019-10-22 DIAGNOSIS — D50.8 OTHER IRON DEFICIENCY ANEMIA: Primary | ICD-10-CM

## 2019-10-22 DIAGNOSIS — E87.6 HYPOKALEMIA: ICD-10-CM

## 2019-10-22 PROCEDURE — 77386 HC IMRT COMPLEX: CPT | Performed by: RADIOLOGY

## 2019-10-22 PROCEDURE — 96360 HYDRATION IV INFUSION INIT: CPT

## 2019-10-22 PROCEDURE — 96361 HYDRATE IV INFUSION ADD-ON: CPT

## 2019-10-22 NOTE — PROGRESS NOTES
[facility] Radiation Treatment Management Note 21-25    Patient:  Bg Cuevas  Age:  66year old  Visit Diagnosis:    1.  Rectal cancer [facility])      Primary Rad/Onc:  Dr. Jm Weiner    Site Delivered Dose (Gy) Prescribed Dose reviewed: Yes    Assessment/Plan:   To complete XRT this week,    IVF today  Using imodium ATC,   History of pernicious anemia requiring B12 injections, pt will f/u with Dr Barton Postal radiotherapy per plan    Next visit:  1 week    Heather Hernandez MD for

## 2019-10-22 NOTE — PATIENT INSTRUCTIONS
Follow up with Dr Lissa Padgett 11/21 @ 800 am.     Continue to moisturize for the next 2 weeks. Please call with any questions or concerns.

## 2019-10-23 ENCOUNTER — APPOINTMENT (OUTPATIENT)
Dept: RADIATION ONCOLOGY | Facility: HOSPITAL | Age: 79
End: 2019-10-23
Attending: RADIOLOGY
Payer: MEDICARE

## 2019-10-23 VITALS
DIASTOLIC BLOOD PRESSURE: 53 MMHG | TEMPERATURE: 98 F | RESPIRATION RATE: 16 BRPM | SYSTOLIC BLOOD PRESSURE: 117 MMHG | WEIGHT: 136.38 LBS | HEART RATE: 63 BPM | OXYGEN SATURATION: 100 % | BODY MASS INDEX: 28 KG/M2

## 2019-10-23 PROCEDURE — 77386 HC IMRT COMPLEX: CPT | Performed by: RADIOLOGY

## 2019-10-23 NOTE — PROGRESS NOTES
Late entry: 10/22/19 1140: Pt added on from RT for hydration for orthostatic hypotension. 1LNS ordered over 1hour. After 1L pt VS very much improved; however, still c/o dizziness, nausea, weakness.   Called Calderon JOYA who stated that per Dr Levin Done ok to giv

## 2019-10-24 ENCOUNTER — DIETICIAN VISIT (OUTPATIENT)
Dept: NUTRITION | Facility: HOSPITAL | Age: 79
End: 2019-10-24

## 2019-10-24 VITALS — BODY MASS INDEX: 29 KG/M2 | WEIGHT: 141.38 LBS

## 2019-10-24 PROCEDURE — 77386 HC IMRT COMPLEX: CPT | Performed by: RADIOLOGY

## 2019-10-24 NOTE — PROGRESS NOTES
Oncology Nutrition Assessment    Ht Readings from Last 1 Encounters:  10/22/19 : 149.9 cm (4' 11\")      Wt Readings from Last 1 Encounters:  10/24/19 : 64.1 kg (141 lb 6.4 oz)    BMI Calculated: Body mass index is 28.56 kg/m². Weight History:   Wt Readin Goals:  maintain weight within 5%, use of nutritional supplements and aid in management of treatment side effects via dietary measures  Handouts Provided:  Nutriton Therapy for Diarrhea     Weekly Visits:  Nutritional Interventions:   9/19/19 Discussed imp not as bad as with gatorade. Did not try rehydration recipes as above. Appetite only fair, but forcing good intake with at least 2 supplements per day. Moderate to severe fatigue. Anxiously awaiting treatment completion next week.   CPM  10/24/19 141.4#-

## 2019-10-25 PROCEDURE — 77336 RADIATION PHYSICS CONSULT: CPT | Performed by: RADIOLOGY

## 2019-10-25 PROCEDURE — 77386 HC IMRT COMPLEX: CPT | Performed by: RADIOLOGY

## 2019-10-28 ENCOUNTER — TELEPHONE (OUTPATIENT)
Dept: HEMATOLOGY/ONCOLOGY | Facility: HOSPITAL | Age: 79
End: 2019-10-28

## 2019-10-28 ENCOUNTER — HOSPITAL ENCOUNTER (INPATIENT)
Facility: HOSPITAL | Age: 79
LOS: 3 days | Discharge: HOME HEALTH | DRG: 395 | End: 2019-10-31
Attending: EMERGENCY MEDICINE | Admitting: INTERNAL MEDICINE
Payer: MEDICARE

## 2019-10-28 DIAGNOSIS — R19.7 DIARRHEA, UNSPECIFIED TYPE: Primary | ICD-10-CM

## 2019-10-28 DIAGNOSIS — D64.9 ANEMIA, UNSPECIFIED TYPE: ICD-10-CM

## 2019-10-28 RX ORDER — FAMOTIDINE 20 MG/1
40 TABLET ORAL NIGHTLY
Status: DISCONTINUED | OUTPATIENT
Start: 2019-10-28 | End: 2019-10-31

## 2019-10-28 RX ORDER — SODIUM CHLORIDE 9 MG/ML
INJECTION, SOLUTION INTRAVENOUS CONTINUOUS
Status: DISCONTINUED | OUTPATIENT
Start: 2019-10-28 | End: 2019-10-29

## 2019-10-28 RX ORDER — MAGNESIUM OXIDE 400 MG (241.3 MG MAGNESIUM) TABLET
200 TABLET DAILY
Status: DISCONTINUED | OUTPATIENT
Start: 2019-10-29 | End: 2019-10-31

## 2019-10-28 RX ORDER — LEVOTHYROXINE SODIUM 137 UG/1
137 TABLET ORAL
Status: DISCONTINUED | OUTPATIENT
Start: 2019-10-29 | End: 2019-10-31

## 2019-10-28 RX ORDER — LORAZEPAM 1 MG/1
1 TABLET ORAL EVERY 8 HOURS PRN
Status: DISCONTINUED | OUTPATIENT
Start: 2019-10-28 | End: 2019-10-31

## 2019-10-28 RX ORDER — HEPARIN SODIUM 5000 [USP'U]/ML
5000 INJECTION, SOLUTION INTRAVENOUS; SUBCUTANEOUS EVERY 12 HOURS SCHEDULED
Status: DISCONTINUED | OUTPATIENT
Start: 2019-10-28 | End: 2019-10-29

## 2019-10-28 RX ORDER — ATORVASTATIN CALCIUM 10 MG/1
10 TABLET, FILM COATED ORAL NIGHTLY
Status: DISCONTINUED | OUTPATIENT
Start: 2019-10-28 | End: 2019-10-31

## 2019-10-28 RX ORDER — POTASSIUM CHLORIDE 20 MEQ/1
20 TABLET, EXTENDED RELEASE ORAL 2 TIMES DAILY
Status: DISCONTINUED | OUTPATIENT
Start: 2019-10-29 | End: 2019-10-31

## 2019-10-28 RX ORDER — ACETAMINOPHEN 325 MG/1
650 TABLET ORAL EVERY 6 HOURS PRN
Status: DISCONTINUED | OUTPATIENT
Start: 2019-10-28 | End: 2019-10-31

## 2019-10-28 RX ORDER — LOSARTAN POTASSIUM 100 MG/1
100 TABLET ORAL DAILY
Status: DISCONTINUED | OUTPATIENT
Start: 2019-10-29 | End: 2019-10-31

## 2019-10-28 RX ORDER — ONDANSETRON 8 MG/1
8 TABLET, ORALLY DISINTEGRATING ORAL EVERY 8 HOURS PRN
Status: DISCONTINUED | OUTPATIENT
Start: 2019-10-28 | End: 2019-10-31

## 2019-10-28 RX ORDER — DIPHENOXYLATE HYDROCHLORIDE AND ATROPINE SULFATE 2.5; .025 MG/1; MG/1
1-2 TABLET ORAL 4 TIMES DAILY PRN
Status: DISCONTINUED | OUTPATIENT
Start: 2019-10-28 | End: 2019-10-31

## 2019-10-28 RX ORDER — DIPHENHYDRAMINE HCL 25 MG
25 CAPSULE ORAL EVERY 4 HOURS PRN
Status: DISCONTINUED | OUTPATIENT
Start: 2019-10-28 | End: 2019-10-31

## 2019-10-28 RX ORDER — ATENOLOL 50 MG/1
50 TABLET ORAL DAILY
Status: DISCONTINUED | OUTPATIENT
Start: 2019-10-29 | End: 2019-10-31

## 2019-10-28 RX ORDER — ALPRAZOLAM 0.5 MG/1
0.25 TABLET ORAL NIGHTLY PRN
Status: DISCONTINUED | OUTPATIENT
Start: 2019-10-28 | End: 2019-10-31

## 2019-10-28 RX ORDER — SERTRALINE HYDROCHLORIDE 100 MG/1
100 TABLET, FILM COATED ORAL DAILY
Status: DISCONTINUED | OUTPATIENT
Start: 2019-10-29 | End: 2019-10-31

## 2019-10-28 RX ORDER — ASPIRIN 81 MG/1
81 TABLET, CHEWABLE ORAL DAILY
Status: DISCONTINUED | OUTPATIENT
Start: 2019-10-29 | End: 2019-10-31

## 2019-10-28 RX ORDER — PANTOPRAZOLE SODIUM 40 MG/1
40 TABLET, DELAYED RELEASE ORAL
Status: DISCONTINUED | OUTPATIENT
Start: 2019-10-29 | End: 2019-10-31

## 2019-10-28 RX ORDER — SODIUM CHLORIDE 0.9 % (FLUSH) 0.9 %
3 SYRINGE (ML) INJECTION AS NEEDED
Status: DISCONTINUED | OUTPATIENT
Start: 2019-10-28 | End: 2019-10-31

## 2019-10-28 NOTE — TELEPHONE ENCOUNTER
Charlie Mcgarry called - Rectal Cancer - completed RT and chemo on Friday(capecitabine)    Diarrhea - Grade 3 -has had 12 stools despite using diphenoxylate-atropine 3 times today. Liquid brown.     Dehydration - Grade 2 - attempting to take in oral but feeling

## 2019-10-29 ENCOUNTER — TELEPHONE (OUTPATIENT)
Dept: HEMATOLOGY/ONCOLOGY | Facility: HOSPITAL | Age: 79
End: 2019-10-29

## 2019-10-29 ENCOUNTER — TELEPHONE (OUTPATIENT)
Dept: PHYSICAL THERAPY | Facility: HOSPITAL | Age: 79
End: 2019-10-29

## 2019-10-29 PROCEDURE — 99222 1ST HOSP IP/OBS MODERATE 55: CPT | Performed by: INTERNAL MEDICINE

## 2019-10-29 RX ORDER — DEXTROSE, SODIUM CHLORIDE, AND POTASSIUM CHLORIDE 5; .45; .3 G/100ML; G/100ML; G/100ML
INJECTION INTRAVENOUS CONTINUOUS
Status: DISCONTINUED | OUTPATIENT
Start: 2019-10-29 | End: 2019-10-31

## 2019-10-29 RX ORDER — POTASSIUM CHLORIDE 14.9 MG/ML
20 INJECTION INTRAVENOUS ONCE
Status: COMPLETED | OUTPATIENT
Start: 2019-10-29 | End: 2019-10-29

## 2019-10-29 NOTE — TELEPHONE ENCOUNTER
Rajeev Orellana called to see if Dr. Chelsea Degroot is going to be seeing her in the hospital. She is in room 444 and was told he would come see her to see if she needed a transfusion.  Please advise

## 2019-10-29 NOTE — CM/SW NOTE
10/31 1127am: Plan is for the pt. To discharge home today 10/31. [facility] is aware and Charli Alarcon orders and face to face have been entered. ------------------  10/29 1211pm: RODOLFO was notified the PT is recommending home with HH. RODOLFO met with the pt.  At

## 2019-10-29 NOTE — ED PROVIDER NOTES
Patient Seen in: [facility] AND [facility] Emergency Department      History   Patient presents with:  Nausea/Vomiting/Diarrhea (gastrointestinal)    Stated Complaint:     HPI    77-year-old with history of colon cancer presents for evaluation of diarrhea.   Bibiana 9\")   Wt 63.5 kg   SpO2 98%   BMI 30.30 kg/m²         Physical Exam  Vitals signs and nursing note reviewed. Constitutional:       General: She is not in acute distress. Appearance: She is well-developed.    HENT:      Head: Normocephalic and atrauma Narrative: The following orders were created for panel order CBC WITH DIFFERENTIAL WITH PLATELET.   Procedure                               Abnormality         Status                     ---------                               -----------         ------

## 2019-10-29 NOTE — PROGRESS NOTES
Nepafenac 0.3 % SUSP 137 mg daily  is Non-Formulary Medication &  Auto-Substituted to Ketorolac 0.5% ophthalmic 1 drop qid to affected eye(s) Per P&T PROTOCOL

## 2019-10-29 NOTE — PLAN OF CARE
Patient is alert and oriented. Anxious about being in the hospital. Stand by assist to the bathroom. Multiple watery/loose stool occurences. Failure to obtain specimen due to contamination of urine. Potassium 2.8 this morning, being replaced.  Mepilex place (undernourished)  Description  INTERVENTIONS:  - Monitor percentage of each meal consumed  - Identify factors contributing to decreased intake, treat as appropriate  - Assist with meals as needed  - Monitor I&O, WT and lab values  - Obtain nutritional cons

## 2019-10-29 NOTE — PLAN OF CARE
Problem: Patient Centered Care  Goal: Patient preferences are identified and integrated in the patient's plan of care  Description  Interventions:  - What would you like us to know as we care for you?  I am feeling overwhelmed by my current state of healt allow for food preferences  - Enhance eating environment  Outcome: Progressing     Problem: Patient/Family Goals  Goal: Patient/Family Long Term Goal  Description  Patient's Long Term Goal: Go home upon discharge  - monitor labs  - Administer medications

## 2019-10-29 NOTE — CONSULTS
[facility]    Report of Consultation    Carley Chavez Patient Status:  Inpatient    1940 MRN [id number]   Location [facility] 4W/SW/SE Attending Reddy Regan MD   Hosp Day # 1 PCP Amado Escalona MD     Date of  • KNEE REPLACEMENT SURGERY Right        Family History  Family History   Problem Relation Age of Onset   • Cancer Sister        Social History  Patient Guardians:  Not on file    Other Topics            Concern  Caffeine Concern        No    Social Histo Itching. aspirin 81 MG Oral Tab, Take 81 mg by mouth daily. MAGNESIUM OR, Take by mouth daily. ondansetron 8 MG Oral Tablet Dispersible, Take 8 mg by mouth every 8 (eight) hours as needed for Nausea.   ALPRAZolam 0.25 MG Oral Tab, TAKE 1 TABLET BY MOUTH affect  NUT: no cachexia      Results:     Laboratory Data:  Lab Results   Component Value Date    WBC 4.1 10/29/2019    HGB 7.3 (L) 10/29/2019    HCT 20.9 (L) 10/29/2019    .0 (L) 10/29/2019    CREATSERUM 1.00 10/29/2019    BUN 14 10/29/2019    NA

## 2019-10-29 NOTE — H&P
[facility]    PATIENT'S NAME: Antoinette Sintia KELLE   ATTENDING PHYSICIAN: Brett Mendez MD   PATIENT ACCOUNT#:   [id number]    LOCATION:  [address] [place] [address] RECORD #:   [id number]       YOB: 1940  ADMISSION DATE:       10/2 tablets twice a day; Zantac 300 mg a day; sertraline 100 mg daily; simvastatin 20 mg nightly. ALLERGIES:  Sulfa. FAMILY HISTORY:  Mother  at 80 of old age. Father  at 46 of myocardial infarction. SOCIAL HISTORY:  Habits:   The patient quit monitor hemograms. We will replace potassium. Will ambulate with assistance. Further recommendations pending clinical course. Dictated By Burnis Munroe Clemmie Runner, MD  d: 10/29/2019 07:47:06  t: 10/29/2019 08:24:26  Job [id number]  [facility]/

## 2019-10-29 NOTE — CONSULTS
Inpatient Oncology Consult    Date of Consult:  10/29/19    Chief Complaint:  Rectal cancer, ovarian cancer, diarrhea weakness anemia    History of Present Illness:  Cancer history:  79-year-old female with a history of non-muscle invasive bladder and dist overall. She denies any current melena or hematochezia her energy level is slowly improving.       Performance Status:  ECOG 0  Past Medical History:  Past Medical History:   Diagnosis Date   • Anxiety state, unspecified    • Bladder cancer ([facility] [address]    • Depr together: Not on file        Attends Anabaptism service: Not on file        Active member of club or organization: Not on file        Attends meetings of clubs or organizations: Not on file        Relationship status: Not on file      Intimate partner viole motor x4.         Laboratory:  Recent Labs   Lab 10/29/19  0504   WBC 4.1   HGB 7.3*   .0*   NE 2.51         Lab Results   Component Value Date    GLU 98 10/29/2019    BUN 14 10/29/2019    BUNCREA 14.0 10/29/2019    CREATSERUM 1.00 10/29/2019    ANIO

## 2019-10-30 PROCEDURE — 99232 SBSQ HOSP IP/OBS MODERATE 35: CPT | Performed by: INTERNAL MEDICINE

## 2019-10-30 RX ORDER — SERTRALINE HYDROCHLORIDE 100 MG/1
100 TABLET, FILM COATED ORAL DAILY
Qty: 30 TABLET | Refills: 2 | Status: SHIPPED | OUTPATIENT
Start: 2019-10-30

## 2019-10-30 RX ORDER — CYANOCOBALAMIN 1000 UG/ML
1000 INJECTION INTRAMUSCULAR; SUBCUTANEOUS
Refills: 0 | Status: SHIPPED | COMMUNITY
Start: 2019-11-29

## 2019-10-30 RX ORDER — CYANOCOBALAMIN 1000 UG/ML
1000 INJECTION INTRAMUSCULAR; SUBCUTANEOUS
Status: DISCONTINUED | OUTPATIENT
Start: 2019-10-30 | End: 2019-10-31

## 2019-10-30 RX ORDER — FAMOTIDINE 40 MG/1
40 TABLET, FILM COATED ORAL NIGHTLY
Qty: 30 TABLET | Refills: 2 | Status: SHIPPED | OUTPATIENT
Start: 2019-10-31

## 2019-10-30 NOTE — PROGRESS NOTES
[facility]    Hematology/Oncology   Progress Note    Benita Isbell Patient Status:  Inpatient    1940 MRN [id number]   Location [facility] 4W/SW/SE Attending Zehra Bailey MD   Hosp Day # 2 PCP Juanis Cormier MD

## 2019-10-30 NOTE — PROGRESS NOTES
[facility]  Gastroenterology Progress Note    Adron Block Patient Status:  Inpatient    1940 MRN [id number]   Location [facility] 4W/SW/SE Attending Raffi Kelly MD   Hosp Day # 2 PCP Elmira Colin MD     Subjective:  Geryl Drivers still with symptoms  -Infectious stool studies negative  -c/w lomotil prn  -Will provide trial of rifaximin 550mg TID for possible SIBO given hx of radiation therapy as well      Clair Raymundo MD  10/30/2019  10:20 AM

## 2019-10-30 NOTE — PLAN OF CARE
Patient alert and oriented. Patient is anxious about being in the hospital and her health. Continuing to have watery loose stools. Labs reviewed. Frequent rounds made. Safety precautions in place. Will continue to monitor.      Problem: Patient Centered Car appropriate  - Assist with meals as needed  - Monitor I&O, WT and lab values  - Obtain nutritional consult as needed  - Optimize oral hygiene and moisture  - Encourage food from home; allow for food preferences  - Enhance eating environment  Outcome: Progr

## 2019-10-30 NOTE — HOME CARE LIAISON
Received referral from [facility] Sanders for residential home health. Met with patient at the bedside. Patient is agreeable to [facility] services upon discharge. Patient given [facility] brochure with contact information.  All questions and concerns were a

## 2019-10-30 NOTE — PROGRESS NOTES
[facility]    Progress Note    Adron Block Patient Status:  Inpatient    1940 MRN [id number]   Location [facility] 4W/SW/SE Attending Raffi Kelly MD   Hosp Day # 2 PCP Elmira Colin MD       Subjective:   Shantelle Baker No results for input(s): LIP, VIPUL in the last 168 hours. No results for input(s): TROP, CK in the last 168 hours. No results for input(s): PT, INR in the last 168 hours. No results for input(s): PGLU in the last 168 hours.     Creatinine   Da Ref Range Status   10/05/2018 372 150 - 450 10^3/uL Final     PLT   Date Value Ref Range Status   10/30/2019 141.0 (L) 150.0 - 450.0 10(3)uL Final   10/29/2019 142.0 (L) 150.0 - 450.0 10(3)uL Final   10/28/2019 171.0 150.0 - 450.0 10(3)uL Final   10/16/201

## 2019-10-30 NOTE — DIETARY NOTE
ADULT NUTRITION INITIAL ASSESSMENT    Pt is at moderate nutrition risk. Pt does not meet malnutrition criteria.       RECOMMENDATIONS TO MD:  See Nutrition Intervention     NUTRITION DIAGNOSIS/PROBLEM:  Increased nutrient needs related to altered GI functi rehydration formula in place of plain gatorade for diarrhea (2 cups regular gatorade, 2 cups water, 1/2 tsp salt).     - Coordination of nutrition care: collaboration with other providers  - Discharge and transfer of nutrition care to new setting or provide added supplements  Food Allergies: No new food allergies  Cultural/Ethnic/Restoration Preferences: Obtained    MEDICATIONS: reviewed; noted trial of rifaximin; receiving oral and IV K replacment  • cyanocobalamin  1,000 mcg Subcutaneous Q30 Days   • rifaximi supplement intake and tolerance of supplement intake.   - Anthropometric Measurement:      Monitor: wt  - Nutrition Goals:      PO greater than 75% of meals, good supplement intake, labs WNL, promote healing, compliance with diet, monitor fluid status and i

## 2019-10-30 NOTE — PHYSICAL THERAPY NOTE
PHYSICAL THERAPY EVALUATION - INPATIENT     Room Number: 444/444-A  Evaluation Date: 10/30/2019  Type of Evaluation: Initial   Physician Order: PT Eval and Treat    Presenting Problem: diarrhea  Reason for Therapy: Mobility Dysfunction and Discharge Plann MEDICAL/SOCIAL HISTORY   Problem List  Principal Problem:    Diarrhea, unspecified type  Active Problems:    Anemia, unspecified type    Diarrhea    Weakness    Chemotherapy induced diarrhea      Past Medical History  Past Medical History:   Diagnosis Date Good  Dynamic Sitting: Good  Static Standing: Fair +  Dynamic Standin [facility] [place] 2 '6-Clicks' INPATIENT SHORT FORM - BASIC MOBILITY  How much difficulty does the patient currently have. ..  -   Turning over in bed (including adjusting bedclothes, shee home activity/exercise instructions provided to patient in preparation for discharge.    Goal #5   Current Status    Goal #6    Goal #6  Current Status

## 2019-10-31 ENCOUNTER — APPOINTMENT (OUTPATIENT)
Dept: PHYSICAL THERAPY | Facility: HOSPITAL | Age: 79
End: 2019-10-31
Attending: INTERNAL MEDICINE
Payer: MEDICARE

## 2019-10-31 VITALS
HEART RATE: 75 BPM | TEMPERATURE: 98 F | OXYGEN SATURATION: 100 % | WEIGHT: 140 LBS | BODY MASS INDEX: 30.2 KG/M2 | RESPIRATION RATE: 18 BRPM | SYSTOLIC BLOOD PRESSURE: 119 MMHG | DIASTOLIC BLOOD PRESSURE: 48 MMHG | HEIGHT: 57 IN

## 2019-10-31 PROCEDURE — 99232 SBSQ HOSP IP/OBS MODERATE 35: CPT | Performed by: INTERNAL MEDICINE

## 2019-10-31 NOTE — PROGRESS NOTES
[facility]  Gastroenterology Progress Note    Isabel Mosquera Patient Status:  Inpatient    1940 MRN [id number]   Location [facility] 4W/SW/SE Attending Aditya Haywood MD   Hosp Day # 3 PCP Piper Dejesus MD     Subjective:  Marianne Collier Trial of rifaximin started for possible SIBO given radiation therapy as well. Started on 10/30, can continue for total of 14 days  -Infectious stool studies negative  -c/w lomotil prn  -Diarrhea has improved with prn lomotil, now also on PO rifaxamin.  Wou

## 2019-10-31 NOTE — PLAN OF CARE
Patient alert and oriented, able to ambulate well with walker. Mepilex on sacrum. Patient reports less watery stools and more gas. Mood is calm and content. Frequent rounds made. Safety precautions in place. Will continue to monitor.      Problem: Patient C decreased intake, treat as appropriate  - Assist with meals as needed  - Monitor I&O, WT and lab values  - Obtain nutritional consult as needed  - Optimize oral hygiene and moisture  - Encourage food from home; allow for food preferences  - Enhance eating

## 2019-10-31 NOTE — PROGRESS NOTES
[facility]    Progress Note    Che Chester Patient Status:  Inpatient    1940 MRN [id number]   Location [facility] 4W/SW/SE Attending Jose Jones MD   Hosp Day # 3 PCP Byron Neves MD       Subjective:   Isael Dos Santos AST  --  12*  --    ALT  --  12*  --    BILT  --  0.3  --    TP  --  5.8*  --        No results for input(s): LIP, VIPUL in the last 168 hours. No results for input(s): TROP, CK in the last 168 hours.     No results for input(s): PT, INR in the last 168 Status   10/05/2018 7.9 (L) 12.0 - 16.0 g/dL Final     Platelet Count   Date Value Ref Range Status   10/05/2018 372 150 - 450 10^3/uL Final     PLT   Date Value Ref Range Status   10/31/2019 127.0 (L) 150.0 - 450.0 10(3)uL Final   10/30/2019 141.0 (L) 150

## 2019-10-31 NOTE — PLAN OF CARE
Problem: Patient Centered Care  Goal: Patient preferences are identified and integrated in the patient's plan of care  Description  Interventions:  - What would you like us to know as we care for you? I would like to go home soon.   - Provide timely, comp preferences  - Enhance eating environment  Outcome: Progressing     Problem: Patient/Family Goals  Goal: Patient/Family Long Term Goal  Description  Patient's Long Term Goal: Go home upon discharge  - monitor labs  - Administer medications  - Pain manageme

## 2019-10-31 NOTE — PROGRESS NOTES
[facility]    Hematology/Oncology   Progress Note    Ry Nick Patient Status:  Inpatient    1940 MRN [id number]   Location [facility] 4W/SW/SE Attending Devi De Leon MD   Hosp Day # 3 PCP Dexter Badillo MD

## 2019-11-05 ENCOUNTER — APPOINTMENT (OUTPATIENT)
Dept: PHYSICAL THERAPY | Facility: HOSPITAL | Age: 79
End: 2019-11-05
Attending: INTERNAL MEDICINE
Payer: MEDICARE

## 2019-11-06 ENCOUNTER — NURSE ONLY (OUTPATIENT)
Dept: HEMATOLOGY/ONCOLOGY | Facility: HOSPITAL | Age: 79
End: 2019-11-06
Attending: RADIOLOGY
Payer: MEDICARE

## 2019-11-06 ENCOUNTER — TELEPHONE (OUTPATIENT)
Dept: HEMATOLOGY/ONCOLOGY | Facility: HOSPITAL | Age: 79
End: 2019-11-06

## 2019-11-06 VITALS
DIASTOLIC BLOOD PRESSURE: 61 MMHG | RESPIRATION RATE: 18 BRPM | TEMPERATURE: 98 F | OXYGEN SATURATION: 99 % | WEIGHT: 138.69 LBS | BODY MASS INDEX: 27.96 KG/M2 | HEIGHT: 59 IN | SYSTOLIC BLOOD PRESSURE: 108 MMHG | HEART RATE: 80 BPM

## 2019-11-06 DIAGNOSIS — C56.9 MALIGNANT NEOPLASM OF OVARY, UNSPECIFIED LATERALITY (HCC): ICD-10-CM

## 2019-11-06 DIAGNOSIS — C20 RECTAL CANCER (HCC): ICD-10-CM

## 2019-11-06 DIAGNOSIS — D50.8 OTHER IRON DEFICIENCY ANEMIA: ICD-10-CM

## 2019-11-06 DIAGNOSIS — C20 RECTAL CANCER (HCC): Primary | ICD-10-CM

## 2019-11-06 DIAGNOSIS — C66.2 URETERAL CANCER, LEFT (HCC): ICD-10-CM

## 2019-11-06 PROCEDURE — 80053 COMPREHEN METABOLIC PANEL: CPT

## 2019-11-06 PROCEDURE — 99214 OFFICE O/P EST MOD 30 MIN: CPT | Performed by: INTERNAL MEDICINE

## 2019-11-06 PROCEDURE — 36415 COLL VENOUS BLD VENIPUNCTURE: CPT

## 2019-11-06 PROCEDURE — 85025 COMPLETE CBC W/AUTO DIFF WBC: CPT

## 2019-11-06 PROCEDURE — 83735 ASSAY OF MAGNESIUM: CPT

## 2019-11-06 NOTE — TELEPHONE ENCOUNTER
Spoke with patient, her K and Mag levels were still pending at the time of her visit with Dr Corey Irwin earlier. MD aware of her K and Mag levels, no action needed at this time, I updated patient of this. She verbalized understanding.

## 2019-11-06 NOTE — PROGRESS NOTES
Cancer Center Progress Note    Patient Name: Isabel Mosquera   YOB: 1940   Medical Record Number: [id number]   Attending Physician: Ritika Jarrett M.D.      Chief Complaint:  Rectal cancer, ovarian cancer    History of Present Illness:  Fabiola Status:  ECOG 0  Past Medical History:  Past Medical History:   Diagnosis Date   • Anxiety state, unspecified    • [facility]    • Depression    • MGUS (monoclonal gammopathy of unknown significance)    • Ovarian ca ([facility] 75.)    • Pernicious anemia organization: Not on file        Attends meetings of clubs or organizations: Not on file        Relationship status: Not on file      Intimate partner violence:        Fear of current or ex partner: Not on file        Emotionally abused: Not on file mg by mouth daily. , Disp: , Rfl:   •  LORazepam 1 MG Oral Tab, Take 1 mg by mouth every 8 (eight) hours as needed (breakthrough nausea). , Disp: , Rfl:   •  ondansetron 8 MG Oral Tablet Dispersible, Take 8 mg by mouth every 8 (eight) hours as needed for [facility] 21 (H) 11/06/2019    BUNCREA 16.9 11/06/2019    CREATSERUM 1.24 (H) 11/06/2019    ANIONGAP 7 11/06/2019    GFRNAA 42 (L) 11/06/2019    GFRAA 48 (L) 11/06/2019    CA 8.5 11/06/2019    OSMOCALC 293 11/06/2019    ALKPHO 85 11/06/2019    AST 12 (L) 11/06/2019

## 2019-11-07 ENCOUNTER — APPOINTMENT (OUTPATIENT)
Dept: PHYSICAL THERAPY | Facility: HOSPITAL | Age: 79
End: 2019-11-07
Attending: INTERNAL MEDICINE
Payer: MEDICARE

## 2019-11-12 ENCOUNTER — APPOINTMENT (OUTPATIENT)
Dept: PHYSICAL THERAPY | Facility: HOSPITAL | Age: 79
End: 2019-11-12
Attending: INTERNAL MEDICINE
Payer: MEDICARE

## 2019-11-13 ENCOUNTER — TELEPHONE (OUTPATIENT)
Dept: HEMATOLOGY/ONCOLOGY | Facility: HOSPITAL | Age: 79
End: 2019-11-13

## 2019-11-13 NOTE — TELEPHONE ENCOUNTER
Called and spoke with patient- offered her a sick visit with Alessandro MCHUGH and . Patient did not think it was necessary at this time, she would like to try BRAT diet first- reviewed what to eat on BRAT diet with patient she stated understanding.   Encouraged her

## 2019-11-13 NOTE — PROGRESS NOTES
[facility]    PATIENT'S NAME: Roddy NINA   RADIATION ONCOLOGIST: Mesfin Rodriguez MD   PATIENT ACCOUNT #: [de-identified] LOCATION: [address] [place] [address] RECORD #: [id number] YOB: 1940   DATE: 10/25/2019       RADIATION ONCO was treated to the pelvis to a dose of 4500 cGy in 180 cGy daily fractions. This was done with RapidArc IMRT and 6 MV photons. This was then followed by a boost to the tumor site of additional 540 cGy in 180 cGy fractions.   This was again done with Rapid

## 2019-11-13 NOTE — TELEPHONE ENCOUNTER
Home health called to say the patient is very fatigued would like a blood test to see if her iron has gone up. She is also having explosive diarrhea and hemorrhoids. The home health nurse asked that she please get a call back.  Unsure if I should put this i

## 2019-11-13 NOTE — TELEPHONE ENCOUNTER
Condition Update: Fatigue/Diarrhea    Fatigue: (Pt continues to feel very fatigued. Denies dyspnea at rest or with exertion.)  Diarrhea: (Having diarrhea since completing chemo & radiation 3.5 weeks ago.  Today has had 5 episodes of explosive \"yellow/orang

## 2019-11-14 ENCOUNTER — APPOINTMENT (OUTPATIENT)
Dept: PHYSICAL THERAPY | Facility: HOSPITAL | Age: 79
End: 2019-11-14
Attending: INTERNAL MEDICINE
Payer: MEDICARE

## 2019-11-19 ENCOUNTER — APPOINTMENT (OUTPATIENT)
Dept: PHYSICAL THERAPY | Facility: HOSPITAL | Age: 79
End: 2019-11-19
Attending: INTERNAL MEDICINE
Payer: MEDICARE

## 2019-11-21 ENCOUNTER — APPOINTMENT (OUTPATIENT)
Dept: PHYSICAL THERAPY | Facility: HOSPITAL | Age: 79
End: 2019-11-21
Attending: INTERNAL MEDICINE
Payer: MEDICARE

## 2019-11-26 ENCOUNTER — TELEPHONE (OUTPATIENT)
Dept: HEMATOLOGY/ONCOLOGY | Facility: HOSPITAL | Age: 79
End: 2019-11-26

## 2019-11-26 RX ORDER — DIPHENOXYLATE HYDROCHLORIDE AND ATROPINE SULFATE 2.5; .025 MG/1; MG/1
1-2 TABLET ORAL 4 TIMES DAILY PRN
Qty: 100 TABLET | Refills: 0 | Status: CANCELLED | OUTPATIENT
Start: 2019-11-26

## 2019-11-26 RX ORDER — DIPHENOXYLATE HYDROCHLORIDE AND ATROPINE SULFATE 2.5; .025 MG/1; MG/1
1-2 TABLET ORAL 4 TIMES DAILY PRN
Qty: 100 TABLET | Refills: 0 | Status: SHIPPED | OUTPATIENT
Start: 2019-11-26 | End: 2020-04-09

## 2019-11-26 NOTE — TELEPHONE ENCOUNTER
Danielle Bharti was wondering when her medication will be ready, because she is leaving tomorrow and needs to pick it up before then.  Please call

## 2019-11-26 NOTE — TELEPHONE ENCOUNTER
Mayelin at [phone] I calling regarding the patient's medication Dithenoxylate-Atrot, patient called the pharmacy looking for the medication, Please call Mikayla Quinones.

## 2019-12-10 ENCOUNTER — TELEPHONE (OUTPATIENT)
Dept: HEMATOLOGY/ONCOLOGY | Facility: HOSPITAL | Age: 79
End: 2019-12-10

## 2019-12-10 PROBLEM — D51.0 PERNICIOUS ANEMIA: Status: ACTIVE | Noted: 2019-12-10

## 2019-12-10 RX ORDER — CYANOCOBALAMIN 1000 UG/ML
1000 INJECTION INTRAMUSCULAR; SUBCUTANEOUS ONCE
Status: CANCELLED
Start: 2019-12-10

## 2019-12-10 RX ORDER — ALPRAZOLAM 0.25 MG/1
TABLET ORAL
Qty: 60 TABLET | Refills: 0 | Status: ON HOLD | OUTPATIENT
Start: 2019-12-10 | End: 2020-02-18

## 2019-12-10 NOTE — TELEPHONE ENCOUNTER
Toshia Harris called to ask Alessandro a question. She would not give specifics about what the question was about she just asked that she get a call back.

## 2019-12-10 NOTE — TELEPHONE ENCOUNTER
Patient called stating she had tingling in her fingertips which is getting better with time. She is inquiring if related to chemotherapy. S/p capecitabine. No PPE.     Explained that it is more likely related to her h/o pernicious anemia for which she re

## 2019-12-11 ENCOUNTER — TELEPHONE (OUTPATIENT)
Dept: HEMATOLOGY/ONCOLOGY | Facility: HOSPITAL | Age: 79
End: 2019-12-11

## 2019-12-11 ENCOUNTER — NURSE ONLY (OUTPATIENT)
Dept: HEMATOLOGY/ONCOLOGY | Facility: HOSPITAL | Age: 79
End: 2019-12-11
Attending: RADIOLOGY
Payer: MEDICARE

## 2019-12-11 VITALS
DIASTOLIC BLOOD PRESSURE: 49 MMHG | TEMPERATURE: 98 F | HEART RATE: 68 BPM | RESPIRATION RATE: 18 BRPM | SYSTOLIC BLOOD PRESSURE: 141 MMHG

## 2019-12-11 DIAGNOSIS — D51.0 PERNICIOUS ANEMIA: Primary | ICD-10-CM

## 2019-12-11 DIAGNOSIS — D50.8 OTHER IRON DEFICIENCY ANEMIA: ICD-10-CM

## 2019-12-11 DIAGNOSIS — C20 RECTAL CANCER (HCC): ICD-10-CM

## 2019-12-11 DIAGNOSIS — D64.9 ANEMIA, UNSPECIFIED TYPE: Primary | ICD-10-CM

## 2019-12-11 PROCEDURE — 86850 RBC ANTIBODY SCREEN: CPT

## 2019-12-11 PROCEDURE — 83540 ASSAY OF IRON: CPT

## 2019-12-11 PROCEDURE — 80053 COMPREHEN METABOLIC PANEL: CPT

## 2019-12-11 PROCEDURE — 85060 BLOOD SMEAR INTERPRETATION: CPT

## 2019-12-11 PROCEDURE — 86880 COOMBS TEST DIRECT: CPT

## 2019-12-11 PROCEDURE — 83615 LACTATE (LD) (LDH) ENZYME: CPT

## 2019-12-11 PROCEDURE — 36415 COLL VENOUS BLD VENIPUNCTURE: CPT

## 2019-12-11 PROCEDURE — 86920 COMPATIBILITY TEST SPIN: CPT

## 2019-12-11 PROCEDURE — 84466 ASSAY OF TRANSFERRIN: CPT

## 2019-12-11 PROCEDURE — 96372 THER/PROPH/DIAG INJ SC/IM: CPT

## 2019-12-11 PROCEDURE — 86900 BLOOD TYPING SEROLOGIC ABO: CPT

## 2019-12-11 PROCEDURE — 82378 CARCINOEMBRYONIC ANTIGEN: CPT

## 2019-12-11 PROCEDURE — 85025 COMPLETE CBC W/AUTO DIFF WBC: CPT

## 2019-12-11 PROCEDURE — 83010 ASSAY OF HAPTOGLOBIN QUANT: CPT

## 2019-12-11 PROCEDURE — 86901 BLOOD TYPING SEROLOGIC RH(D): CPT

## 2019-12-11 RX ORDER — CYANOCOBALAMIN 1000 UG/ML
1000 INJECTION INTRAMUSCULAR; SUBCUTANEOUS ONCE
Status: COMPLETED | OUTPATIENT
Start: 2019-12-11 | End: 2019-12-11

## 2019-12-11 RX ORDER — CYANOCOBALAMIN 1000 UG/ML
INJECTION INTRAMUSCULAR; SUBCUTANEOUS
Status: COMPLETED
Start: 2019-12-11 | End: 2019-12-11

## 2019-12-11 RX ORDER — CYANOCOBALAMIN 1000 UG/ML
1000 INJECTION INTRAMUSCULAR; SUBCUTANEOUS ONCE
Status: CANCELLED
Start: 2020-01-08

## 2019-12-11 RX ADMIN — CYANOCOBALAMIN 1000 MCG: 1000 INJECTION INTRAMUSCULAR; SUBCUTANEOUS at 08:26:00

## 2019-12-11 NOTE — TELEPHONE ENCOUNTER
Patient called back. She has been more fatigued recently and has dyspnea with exertion. No chest pain.   Explained that she should go back to lab to have type and screen drawn, and that the  will be calling her to get her set up for one unit of b

## 2019-12-11 NOTE — TELEPHONE ENCOUNTER
Received notification of critical Hg of 6.7. Updated Dr Marj Torres who ordered 1 u PRBCs. Iron level added on to existing labs. Called patient at home number, NA- LM asking for call back soon regarding labs. Also called cell number, NA- LMTCB.

## 2019-12-11 NOTE — DISCHARGE SUMMARY
[facility]    PATIENT'S NAME: Hilario NINA   ATTENDING PHYSICIAN: Charles C. Clemmie Runner, MD   PATIENT ACCOUNT#:   [id number]    LOCATION:  [address] [place] [address] RECORD #:   [id number]       YOB: 1940  ADMISSION DATE:       10/2 instructions. ARRANGEMENT OF FUTURE CARE:  Patient to be followed by specialists as directed and will see Dr. Andrea Shirley in 1 week. Dictated By Carly Shirley MD  d: 12/11/2019 07:17:15  t: 12/11/2019 08:33:18  Job [id number]  [facility]    cc: C

## 2019-12-12 ENCOUNTER — OFFICE VISIT (OUTPATIENT)
Dept: HEMATOLOGY/ONCOLOGY | Facility: HOSPITAL | Age: 79
End: 2019-12-12
Attending: RADIOLOGY
Payer: MEDICARE

## 2019-12-12 VITALS
OXYGEN SATURATION: 99 % | SYSTOLIC BLOOD PRESSURE: 116 MMHG | DIASTOLIC BLOOD PRESSURE: 56 MMHG | RESPIRATION RATE: 16 BRPM | TEMPERATURE: 98 F | HEART RATE: 67 BPM

## 2019-12-12 DIAGNOSIS — D64.9 ANEMIA, UNSPECIFIED TYPE: Primary | ICD-10-CM

## 2019-12-12 PROCEDURE — 36430 TRANSFUSION BLD/BLD COMPNT: CPT

## 2019-12-12 NOTE — PROGRESS NOTES
Assumed care of patient at . Patient sleeping most of transfusion, but feeling well when awake. At 1610, noticed patient's PIV edematous and patient states it is sore. PIV infiltrated. Site removed, applied 2x2 gauze and coban to site.  New PIV site est

## 2019-12-12 NOTE — PROGRESS NOTES
Pt here for 1 unit packed RBC.   H/H = 6.7/ 20.3  Reports fatigue, dyspnea with exertion  She drove herself today  States she isn't eating as well lately due to fatigue  Ankles are puffy - elevated during visit, encouraged Ensure for protein when too tired

## 2019-12-13 ENCOUNTER — OFFICE VISIT (OUTPATIENT)
Dept: RADIATION ONCOLOGY | Facility: HOSPITAL | Age: 79
End: 2019-12-13
Attending: RADIOLOGY
Payer: MEDICARE

## 2019-12-13 VITALS
BODY MASS INDEX: 29.31 KG/M2 | RESPIRATION RATE: 16 BRPM | SYSTOLIC BLOOD PRESSURE: 122 MMHG | TEMPERATURE: 99 F | WEIGHT: 145.38 LBS | DIASTOLIC BLOOD PRESSURE: 51 MMHG | HEART RATE: 72 BPM | HEIGHT: 59 IN

## 2019-12-13 DIAGNOSIS — C20 RECTAL CANCER (HCC): Primary | ICD-10-CM

## 2019-12-13 PROCEDURE — 99211 OFF/OP EST MAY X REQ PHY/QHP: CPT

## 2019-12-13 NOTE — PROGRESS NOTES
Pt seen in 1.5 month follow up with Dr Radha Barney, having completed radiation to the rectum/pelvis 10/25/19. Pt received 1 unit RPC's yesterday for a HGB 6.7.  Feeling very well today, and states she is going to the casino today with her girlfriends for [facility]

## 2019-12-14 NOTE — PROGRESS NOTES
[facility]    PATIENT'S NAME: Chris NINA   RADIATION ONCOLOGIST: Melina Workman.  Binu Jernigan MD   PATIENT ACCOUNT #: [de-identified] LOCATION: [address] [place] [address] RECORD #: [id number] YOB: 1940   FOLLOW-UP DATE: 12/13/2019       RADI referred for chemoradiotherapy and she received a total of 4500 cGy to the whole pelvis followed by an additional 540 cGy boost to the tumor bed.   This was done in conjunction with chemotherapy as given by Dr. Hodan Hall, and she completed these treatments on 10 overall is fairly delicate, but I am very pleased with her recovery to this point. She will continue to follow with Dr. Beatriz Chong.   She did not have good experiences [place] in the past and would like to move all of her care here to [facility].  She does

## 2019-12-17 ENCOUNTER — LAB ENCOUNTER (OUTPATIENT)
Dept: LAB | Age: 79
End: 2019-12-17
Attending: PHYSICIAN ASSISTANT
Payer: MEDICARE

## 2019-12-17 DIAGNOSIS — D64.9 ANEMIA, UNSPECIFIED TYPE: ICD-10-CM

## 2019-12-17 DIAGNOSIS — C20 RECTAL CANCER (HCC): ICD-10-CM

## 2019-12-17 PROCEDURE — 84466 ASSAY OF TRANSFERRIN: CPT

## 2019-12-17 PROCEDURE — 80053 COMPREHEN METABOLIC PANEL: CPT

## 2019-12-17 PROCEDURE — 83540 ASSAY OF IRON: CPT

## 2019-12-17 PROCEDURE — 36415 COLL VENOUS BLD VENIPUNCTURE: CPT

## 2019-12-17 PROCEDURE — 82607 VITAMIN B-12: CPT

## 2019-12-17 PROCEDURE — 85025 COMPLETE CBC W/AUTO DIFF WBC: CPT

## 2019-12-18 ENCOUNTER — OFFICE VISIT (OUTPATIENT)
Dept: HEMATOLOGY/ONCOLOGY | Facility: HOSPITAL | Age: 79
End: 2019-12-18
Attending: RADIOLOGY
Payer: MEDICARE

## 2019-12-18 ENCOUNTER — HOSPITAL ENCOUNTER (OUTPATIENT)
Dept: ULTRASOUND IMAGING | Facility: HOSPITAL | Age: 79
Discharge: HOME | End: 2019-12-18
Attending: INTERNAL MEDICINE
Payer: MEDICARE

## 2019-12-18 VITALS
BODY MASS INDEX: 29.19 KG/M2 | DIASTOLIC BLOOD PRESSURE: 51 MMHG | HEIGHT: 59 IN | WEIGHT: 144.81 LBS | HEART RATE: 66 BPM | SYSTOLIC BLOOD PRESSURE: 121 MMHG | RESPIRATION RATE: 16 BRPM | TEMPERATURE: 98 F | OXYGEN SATURATION: 96 %

## 2019-12-18 DIAGNOSIS — R60.0 BILATERAL LEG EDEMA: ICD-10-CM

## 2019-12-18 DIAGNOSIS — C20 RECTAL CANCER (HCC): Primary | ICD-10-CM

## 2019-12-18 DIAGNOSIS — C56.9 MALIGNANT NEOPLASM OF OVARY, UNSPECIFIED LATERALITY (HCC): ICD-10-CM

## 2019-12-18 DIAGNOSIS — C66.2 URETERAL CANCER, LEFT (HCC): ICD-10-CM

## 2019-12-18 DIAGNOSIS — D64.9 ANEMIA, UNSPECIFIED TYPE: ICD-10-CM

## 2019-12-18 PROCEDURE — 99214 OFFICE O/P EST MOD 30 MIN: CPT | Performed by: INTERNAL MEDICINE

## 2019-12-18 PROCEDURE — 93970 EXTREMITY STUDY: CPT | Performed by: INTERNAL MEDICINE

## 2019-12-18 NOTE — PROGRESS NOTES
Cancer Center Progress Note    Patient Name: Benita Isbell   YOB: 1940   Medical Record Number: [id number]   Attending Physician: Lavelle Leal M.D.      Chief Complaint:  Rectal cancer, ovarian cancer    History of Present Illness:  Fabiola Date   • Anxiety state, unspecified    • Bladder cancer ([facility]    • Depression    • MGUS (monoclonal gammopathy of unknown significance)    • Ovarian ca [facility])    • Pernicious anemia    • Pernicious anemia    • Rectal cancer ([facility]    • Transfusion history file        Relationship status: Not on file      Intimate partner violence:        Fear of current or ex partner: Not on file        Emotionally abused: Not on file        Physically abused: Not on file        Forced sexual activity: Not on file    Other Rfl: 0  •  aspirin 81 MG Oral Tab, Take 81 mg by mouth daily. , Disp: , Rfl:   •  LORazepam 1 MG Oral Tab, Take 1 mg by mouth every 8 (eight) hours as needed (breakthrough nausea). , Disp: , Rfl:   •  ondansetron 8 MG Oral Tablet Dispersible, Take 8 mg by mo 1.05 (H) 12/17/2019    ANIONGAP 7 12/17/2019    GFRNAA 51 (L) 12/17/2019    GFRAA 58 (L) 12/17/2019    CA 6.9 (L) 12/17/2019    OSMOCALC 295 12/17/2019    ALKPHO 89 12/17/2019    AST 17 12/17/2019    ALT 18 12/17/2019    BILT 0.2 12/17/2019    TP 6.9 12/17

## 2020-01-08 ENCOUNTER — NURSE ONLY (OUTPATIENT)
Dept: HEMATOLOGY/ONCOLOGY | Facility: HOSPITAL | Age: 80
End: 2020-01-08
Attending: RADIOLOGY
Payer: MEDICARE

## 2020-01-08 VITALS
OXYGEN SATURATION: 98 % | SYSTOLIC BLOOD PRESSURE: 146 MMHG | TEMPERATURE: 98 F | DIASTOLIC BLOOD PRESSURE: 63 MMHG | HEART RATE: 73 BPM | RESPIRATION RATE: 16 BRPM

## 2020-01-08 DIAGNOSIS — D51.0 PERNICIOUS ANEMIA: Primary | ICD-10-CM

## 2020-01-08 PROCEDURE — 96372 THER/PROPH/DIAG INJ SC/IM: CPT

## 2020-01-08 RX ORDER — CYANOCOBALAMIN 1000 UG/ML
INJECTION INTRAMUSCULAR; SUBCUTANEOUS
Status: COMPLETED
Start: 2020-01-08 | End: 2020-01-08

## 2020-01-08 RX ORDER — CYANOCOBALAMIN 1000 UG/ML
1000 INJECTION INTRAMUSCULAR; SUBCUTANEOUS ONCE
Status: CANCELLED
Start: 2020-02-05

## 2020-01-08 RX ORDER — CYANOCOBALAMIN 1000 UG/ML
1000 INJECTION INTRAMUSCULAR; SUBCUTANEOUS ONCE
Status: COMPLETED | OUTPATIENT
Start: 2020-01-08 | End: 2020-01-08

## 2020-01-08 RX ADMIN — CYANOCOBALAMIN 1000 MCG: 1000 INJECTION INTRAMUSCULAR; SUBCUTANEOUS at 08:09:00

## 2020-01-08 NOTE — PROGRESS NOTES
Alessandra Walters arrived ambulating independently by self for B12 injection. Patient reports she is feeling well today. B12 given to left deltoid. No oozing or bleeding noted. Left open to air.  Patient discharged aware of future appointments and declined need for

## 2020-01-22 ENCOUNTER — TELEPHONE (OUTPATIENT)
Dept: HEMATOLOGY/ONCOLOGY | Facility: HOSPITAL | Age: 80
End: 2020-01-22

## 2020-01-22 NOTE — TELEPHONE ENCOUNTER
Jen Cortez called to say that she has recognized a clot in the place that she has previously had the cancer. She asked that she please get a call back to go over this.

## 2020-01-23 ENCOUNTER — OFFICE VISIT (OUTPATIENT)
Dept: HEMATOLOGY/ONCOLOGY | Facility: HOSPITAL | Age: 80
End: 2020-01-23
Attending: INTERNAL MEDICINE
Payer: MEDICARE

## 2020-01-23 VITALS
DIASTOLIC BLOOD PRESSURE: 77 MMHG | SYSTOLIC BLOOD PRESSURE: 142 MMHG | HEART RATE: 69 BPM | BODY MASS INDEX: 29 KG/M2 | RESPIRATION RATE: 16 BRPM | TEMPERATURE: 98 F | WEIGHT: 144.81 LBS | OXYGEN SATURATION: 96 %

## 2020-01-23 DIAGNOSIS — C20 RECTAL CANCER (HCC): Primary | ICD-10-CM

## 2020-01-23 DIAGNOSIS — C56.9 MALIGNANT NEOPLASM OF OVARY, UNSPECIFIED LATERALITY (HCC): ICD-10-CM

## 2020-01-23 DIAGNOSIS — C66.2 URETERAL CANCER, LEFT (HCC): ICD-10-CM

## 2020-01-23 LAB
ALBUMIN SERPL-MCNC: 3.6 G/DL (ref 3.4–5)
ALBUMIN/GLOB SERPL: 1 {RATIO} (ref 1–2)
ALP LIVER SERPL-CCNC: 83 U/L (ref 55–142)
ALT SERPL-CCNC: 13 U/L (ref 13–56)
ANION GAP SERPL CALC-SCNC: 7 MMOL/L (ref 0–18)
AST SERPL-CCNC: 14 U/L (ref 15–37)
BASOPHILS # BLD AUTO: 0.03 X10(3) UL (ref 0–0.2)
BASOPHILS NFR BLD AUTO: 0.7 %
BILIRUB SERPL-MCNC: 0.3 MG/DL (ref 0.1–2)
BUN BLD-MCNC: 21 MG/DL (ref 7–18)
BUN/CREAT SERPL: 18.4 (ref 10–20)
CALCIUM BLD-MCNC: 8.3 MG/DL (ref 8.5–10.1)
CEA SERPL-MCNC: 4.8 NG/ML (ref ?–5)
CHLORIDE SERPL-SCNC: 112 MMOL/L (ref 98–112)
CO2 SERPL-SCNC: 23 MMOL/L (ref 21–32)
CREAT BLD-MCNC: 1.14 MG/DL (ref 0.55–1.02)
DEPRECATED RDW RBC AUTO: 48 FL (ref 35.1–46.3)
EOSINOPHIL # BLD AUTO: 0.26 X10(3) UL (ref 0–0.7)
EOSINOPHIL NFR BLD AUTO: 5.8 %
ERYTHROCYTE [DISTWIDTH] IN BLOOD BY AUTOMATED COUNT: 12.1 % (ref 11–15)
GLOBULIN PLAS-MCNC: 3.6 G/DL (ref 2.8–4.4)
GLUCOSE BLD-MCNC: 86 MG/DL (ref 70–99)
HCT VFR BLD AUTO: 26.2 % (ref 35–48)
HGB BLD-MCNC: 8.7 G/DL (ref 12–16)
IMM GRANULOCYTES # BLD AUTO: 0.05 X10(3) UL (ref 0–1)
IMM GRANULOCYTES NFR BLD: 1.1 %
LYMPHOCYTES # BLD AUTO: 0.76 X10(3) UL (ref 1–4)
LYMPHOCYTES NFR BLD AUTO: 17.1 %
M PROTEIN MFR SERPL ELPH: 7.2 G/DL (ref 6.4–8.2)
MCH RBC QN AUTO: 35.4 PG (ref 26–34)
MCHC RBC AUTO-ENTMCNC: 33.2 G/DL (ref 31–37)
MCV RBC AUTO: 106.5 FL (ref 80–100)
MONOCYTES # BLD AUTO: 0.47 X10(3) UL (ref 0.1–1)
MONOCYTES NFR BLD AUTO: 10.6 %
NEUTROPHILS # BLD AUTO: 2.88 X10 (3) UL (ref 1.5–7.7)
NEUTROPHILS # BLD AUTO: 2.88 X10(3) UL (ref 1.5–7.7)
NEUTROPHILS NFR BLD AUTO: 64.7 %
OSMOLALITY SERPL CALC.SUM OF ELEC: 296 MOSM/KG (ref 275–295)
PLATELET # BLD AUTO: 194 10(3)UL (ref 150–450)
POTASSIUM SERPL-SCNC: 3.9 MMOL/L (ref 3.5–5.1)
RBC # BLD AUTO: 2.46 X10(6)UL (ref 3.8–5.3)
SODIUM SERPL-SCNC: 142 MMOL/L (ref 136–145)
WBC # BLD AUTO: 4.5 X10(3) UL (ref 4–11)

## 2020-01-23 PROCEDURE — 99214 OFFICE O/P EST MOD 30 MIN: CPT | Performed by: INTERNAL MEDICINE

## 2020-01-23 NOTE — PROGRESS NOTES
Cancer Center Progress Note    Patient Name: Tara Hassan   YOB: 1940   Medical Record Number: [id number]   Attending Physician: Mariana Devries M.D.      Chief Complaint:  Rectal cancer, ovarian cancer    History of Present Illness:  Fabiola her energy level is slowly improving.       Performance Status:  ECOG 0  Past Medical History:  Past Medical History:   Diagnosis Date   • Anxiety state, unspecified    • Bladder cancer ([facility]    • Depression    • MGUS (monoclonal gammopathy of unknown signi Not on file        Active member of club or organization: Not on file        Attends meetings of clubs or organizations: Not on file        Relationship status: Not on file      Intimate partner violence:        Fear of current or ex partner: Not on file diphenhydrAMINE HCl 25 MG Oral Cap, Take 1 capsule (25 mg total) by mouth every 4 (four) hours as needed for Itching., Disp: , Rfl: 0  •  aspirin 81 MG Oral Tab, Take 81 mg by mouth daily. , Disp: , Rfl:   •  LORazepam 1 MG Oral Tab, Take 1 mg by mouth taurus Date    GLU 86 12/17/2019    BUN 28 (H) 12/17/2019    BUNCREA 26.7 (H) 12/17/2019    CREATSERUM 1.05 (H) 12/17/2019    ANIONGAP 7 12/17/2019    GFRNAA 51 (L) 12/17/2019    GFRAA 58 (L) 12/17/2019    CA 6.9 (L) 12/17/2019    OSMOCALC 295 12/17/2019    ALKPH she has recurrence we will set her up to see surgery here    RTC 6 weeks MRI labs    The patient's emotional well being was assessed and resources were discussed. Appropriate resources were reviewed and discussed with the patient and family.      Oleg Quinones

## 2020-01-24 ENCOUNTER — TELEPHONE (OUTPATIENT)
Dept: HEMATOLOGY/ONCOLOGY | Facility: HOSPITAL | Age: 80
End: 2020-01-24

## 2020-01-24 NOTE — TELEPHONE ENCOUNTER
Returned call to Mitch Silva. She called Dr Ange Ortiz as instructed, and was told by his office that Dr Ange Ortiz sees inpatients only and that she would have to see his partner Dr Cole Fletcher.  Encouraged her to call their office back and schedule an appt with Dr Cole Fletcher, who

## 2020-01-24 NOTE — TELEPHONE ENCOUNTER
Pamela Zaira called to check on her blood results. She says she doesn't see them in [place]. She also says the doctor she spoke with Alejandro Lee about does not have office hours.  Please call

## 2020-01-29 ENCOUNTER — APPOINTMENT (OUTPATIENT)
Dept: HEMATOLOGY/ONCOLOGY | Facility: HOSPITAL | Age: 80
End: 2020-01-29
Attending: RADIOLOGY
Payer: MEDICARE

## 2020-01-31 RX ORDER — POTASSIUM CHLORIDE 750 MG/1
TABLET, EXTENDED RELEASE ORAL
Qty: 360 TABLET | Refills: 0 | Status: ON HOLD | OUTPATIENT
Start: 2020-01-31 | End: 2020-02-18

## 2020-02-04 ENCOUNTER — TELEPHONE (OUTPATIENT)
Dept: HEMATOLOGY/ONCOLOGY | Facility: HOSPITAL | Age: 80
End: 2020-02-04

## 2020-02-04 NOTE — TELEPHONE ENCOUNTER
Called Lizzie to confirm appt for 2/5 and wants to speak to [facility]. She has to have teeth extracted and wants to make sure it will be ok.  candice

## 2020-02-04 NOTE — TELEPHONE ENCOUNTER
Called patient, no restrictions from oncology viewpoint regarding dental work, she verbalized understanding.

## 2020-02-05 ENCOUNTER — TELEPHONE (OUTPATIENT)
Dept: HEMATOLOGY/ONCOLOGY | Facility: HOSPITAL | Age: 80
End: 2020-02-05

## 2020-02-05 ENCOUNTER — APPOINTMENT (OUTPATIENT)
Dept: HEMATOLOGY/ONCOLOGY | Facility: HOSPITAL | Age: 80
End: 2020-02-05
Attending: RADIOLOGY
Payer: MEDICARE

## 2020-02-05 NOTE — TELEPHONE ENCOUNTER
Rectal Cancer -  B 12 injection 1/8/20 - Judith    Canceled today's B 12 injection apt. She has been having lower right sciatic back pain, radiated down leg or generally lower back, no numbness or tingling.  for about a week, using walker but pain no

## 2020-02-11 ENCOUNTER — APPOINTMENT (OUTPATIENT)
Dept: HEMATOLOGY/ONCOLOGY | Facility: HOSPITAL | Age: 80
End: 2020-02-11
Attending: RADIOLOGY
Payer: MEDICARE

## 2020-02-12 PROBLEM — F41.9 ANXIETY: Status: ACTIVE | Noted: 2018-12-20

## 2020-02-12 PROBLEM — E78.2 MIXED HYPERLIPIDEMIA: Status: ACTIVE | Noted: 2019-06-25

## 2020-02-12 PROBLEM — R19.00 PELVIC MASS: Status: ACTIVE | Noted: 2018-12-13

## 2020-02-12 PROBLEM — E03.9 HYPOTHYROIDISM: Status: ACTIVE | Noted: 2018-12-20

## 2020-02-12 PROBLEM — Z98.890 POST-OPERATIVE STATE: Status: ACTIVE | Noted: 2018-12-20

## 2020-02-12 PROBLEM — I49.1 PAC (PREMATURE ATRIAL CONTRACTION): Status: ACTIVE | Noted: 2019-06-25

## 2020-02-14 ENCOUNTER — APPOINTMENT (OUTPATIENT)
Dept: MRI IMAGING | Facility: HOSPITAL | Age: 80
DRG: 552 | End: 2020-02-14
Attending: EMERGENCY MEDICINE
Payer: MEDICARE

## 2020-02-14 ENCOUNTER — HOSPITAL ENCOUNTER (INPATIENT)
Facility: HOSPITAL | Age: 80
LOS: 4 days | Discharge: NURSING, REHAB OR LONG-TERM CARE | DRG: 552 | End: 2020-02-18
Attending: EMERGENCY MEDICINE | Admitting: INTERNAL MEDICINE
Payer: MEDICARE

## 2020-02-14 DIAGNOSIS — M54.9 SEVERE BACK PAIN: ICD-10-CM

## 2020-02-14 DIAGNOSIS — E83.42 HYPOMAGNESEMIA: Primary | ICD-10-CM

## 2020-02-14 DIAGNOSIS — E87.6 HYPOKALEMIA: ICD-10-CM

## 2020-02-14 LAB
ANION GAP SERPL CALC-SCNC: 8 MMOL/L (ref 0–18)
BASOPHILS # BLD AUTO: 0.07 X10(3) UL (ref 0–0.2)
BASOPHILS NFR BLD AUTO: 1.5 %
BUN BLD-MCNC: 16 MG/DL (ref 7–18)
BUN/CREAT SERPL: 17.8 (ref 10–20)
CALCIUM BLD-MCNC: 9.3 MG/DL (ref 8.5–10.1)
CHLORIDE SERPL-SCNC: 108 MMOL/L (ref 98–112)
CO2 SERPL-SCNC: 25 MMOL/L (ref 21–32)
CREAT BLD-MCNC: 0.9 MG/DL (ref 0.55–1.02)
DEPRECATED RDW RBC AUTO: 45.6 FL (ref 35.1–46.3)
EOSINOPHIL # BLD AUTO: 0.32 X10(3) UL (ref 0–0.7)
EOSINOPHIL NFR BLD AUTO: 6.6 %
ERYTHROCYTE [DISTWIDTH] IN BLOOD BY AUTOMATED COUNT: 12.2 % (ref 11–15)
GLUCOSE BLD-MCNC: 114 MG/DL (ref 70–99)
HAV IGM SER QL: 1.2 MG/DL (ref 1.6–2.6)
HCT VFR BLD AUTO: 29.1 % (ref 35–48)
HGB BLD-MCNC: 9.8 G/DL (ref 12–16)
IMM GRANULOCYTES # BLD AUTO: 0.04 X10(3) UL (ref 0–1)
IMM GRANULOCYTES NFR BLD: 0.8 %
LYMPHOCYTES # BLD AUTO: 0.65 X10(3) UL (ref 1–4)
LYMPHOCYTES NFR BLD AUTO: 13.5 %
MCH RBC QN AUTO: 34.4 PG (ref 26–34)
MCHC RBC AUTO-ENTMCNC: 33.7 G/DL (ref 31–37)
MCV RBC AUTO: 102.1 FL (ref 80–100)
MONOCYTES # BLD AUTO: 0.43 X10(3) UL (ref 0.1–1)
MONOCYTES NFR BLD AUTO: 8.9 %
NEUTROPHILS # BLD AUTO: 3.31 X10 (3) UL (ref 1.5–7.7)
NEUTROPHILS # BLD AUTO: 3.31 X10(3) UL (ref 1.5–7.7)
NEUTROPHILS NFR BLD AUTO: 68.7 %
OSMOLALITY SERPL CALC.SUM OF ELEC: 294 MOSM/KG (ref 275–295)
PLATELET # BLD AUTO: 275 10(3)UL (ref 150–450)
POTASSIUM SERPL-SCNC: 2.6 MMOL/L (ref 3.5–5.1)
RBC # BLD AUTO: 2.85 X10(6)UL (ref 3.8–5.3)
SODIUM SERPL-SCNC: 141 MMOL/L (ref 136–145)
WBC # BLD AUTO: 4.8 X10(3) UL (ref 4–11)

## 2020-02-14 PROCEDURE — 72158 MRI LUMBAR SPINE W/O & W/DYE: CPT | Performed by: EMERGENCY MEDICINE

## 2020-02-14 RX ORDER — DEXAMETHASONE SODIUM PHOSPHATE 10 MG/ML
10 INJECTION, SOLUTION INTRAMUSCULAR; INTRAVENOUS ONCE
Status: COMPLETED | OUTPATIENT
Start: 2020-02-14 | End: 2020-02-14

## 2020-02-14 RX ORDER — SERTRALINE HYDROCHLORIDE 100 MG/1
100 TABLET, FILM COATED ORAL DAILY
Status: DISCONTINUED | OUTPATIENT
Start: 2020-02-14 | End: 2020-02-18

## 2020-02-14 RX ORDER — DIPHENHYDRAMINE HCL 25 MG
25 CAPSULE ORAL EVERY 4 HOURS PRN
Status: DISCONTINUED | OUTPATIENT
Start: 2020-02-14 | End: 2020-02-18

## 2020-02-14 RX ORDER — FAMOTIDINE 40 MG/1
40 TABLET, FILM COATED ORAL NIGHTLY
Status: DISCONTINUED | OUTPATIENT
Start: 2020-02-14 | End: 2020-02-18

## 2020-02-14 RX ORDER — KETOROLAC TROMETHAMINE 15 MG/ML
15 INJECTION, SOLUTION INTRAMUSCULAR; INTRAVENOUS ONCE
Status: COMPLETED | OUTPATIENT
Start: 2020-02-14 | End: 2020-02-14

## 2020-02-14 RX ORDER — SODIUM CHLORIDE 0.9 % (FLUSH) 0.9 %
3 SYRINGE (ML) INJECTION AS NEEDED
Status: DISCONTINUED | OUTPATIENT
Start: 2020-02-14 | End: 2020-02-18

## 2020-02-14 RX ORDER — ENOXAPARIN SODIUM 100 MG/ML
40 INJECTION SUBCUTANEOUS DAILY
Status: DISCONTINUED | OUTPATIENT
Start: 2020-02-14 | End: 2020-02-15

## 2020-02-14 RX ORDER — ACETAMINOPHEN 325 MG/1
650 TABLET ORAL EVERY 6 HOURS PRN
Status: DISCONTINUED | OUTPATIENT
Start: 2020-02-14 | End: 2020-02-15

## 2020-02-14 RX ORDER — POTASSIUM CHLORIDE 20 MEQ/1
40 TABLET, EXTENDED RELEASE ORAL ONCE
Status: COMPLETED | OUTPATIENT
Start: 2020-02-14 | End: 2020-02-14

## 2020-02-14 RX ORDER — PANTOPRAZOLE SODIUM 40 MG/1
40 TABLET, DELAYED RELEASE ORAL
Status: DISCONTINUED | OUTPATIENT
Start: 2020-02-15 | End: 2020-02-18

## 2020-02-14 RX ORDER — ALPRAZOLAM 0.25 MG/1
0.25 TABLET ORAL 2 TIMES DAILY PRN
Status: DISCONTINUED | OUTPATIENT
Start: 2020-02-14 | End: 2020-02-18

## 2020-02-14 RX ORDER — ATENOLOL 50 MG/1
50 TABLET ORAL DAILY
Status: DISCONTINUED | OUTPATIENT
Start: 2020-02-14 | End: 2020-02-18

## 2020-02-14 RX ORDER — POTASSIUM CHLORIDE 20 MEQ/1
40 TABLET, EXTENDED RELEASE ORAL 2 TIMES DAILY
Status: DISCONTINUED | OUTPATIENT
Start: 2020-02-14 | End: 2020-02-15

## 2020-02-14 RX ORDER — TRAMADOL HYDROCHLORIDE 50 MG/1
50 TABLET ORAL EVERY 6 HOURS PRN
Status: DISCONTINUED | OUTPATIENT
Start: 2020-02-14 | End: 2020-02-16

## 2020-02-14 RX ORDER — ONDANSETRON 4 MG/1
8 TABLET, ORALLY DISINTEGRATING ORAL EVERY 8 HOURS PRN
Status: DISCONTINUED | OUTPATIENT
Start: 2020-02-14 | End: 2020-02-18

## 2020-02-14 RX ORDER — MORPHINE SULFATE 4 MG/ML
4 INJECTION, SOLUTION INTRAMUSCULAR; INTRAVENOUS ONCE
Status: COMPLETED | OUTPATIENT
Start: 2020-02-14 | End: 2020-02-14

## 2020-02-14 RX ORDER — DIPHENOXYLATE HYDROCHLORIDE AND ATROPINE SULFATE 2.5; .025 MG/1; MG/1
1-2 TABLET ORAL 4 TIMES DAILY PRN
Status: DISCONTINUED | OUTPATIENT
Start: 2020-02-14 | End: 2020-02-18

## 2020-02-14 RX ORDER — LEVOTHYROXINE SODIUM 137 UG/1
137 TABLET ORAL
Status: DISCONTINUED | OUTPATIENT
Start: 2020-02-14 | End: 2020-02-18

## 2020-02-14 RX ORDER — LOSARTAN POTASSIUM 100 MG/1
100 TABLET ORAL DAILY
Status: DISCONTINUED | OUTPATIENT
Start: 2020-02-14 | End: 2020-02-18

## 2020-02-14 NOTE — H&P
[facility]    PATIENT'S NAME: Orly Garcia KELLE   ATTENDING PHYSICIAN: Brett Rodarte MD   PATIENT ACCOUNT#:   [id number]    LOCATION:  [place] [address] #:   [id number]       YOB: 1940  ADMISSION DATE:       02/14/20 chloride 20 mEq twice a day, Zantac 300 mg a day, sertraline 50 mg a day, and simvastatin 20 mg nightly. ALLERGIES:  Sulfa. FAMILY HISTORY:  Mother  at 80 of old age. Father  at 46 of myocardial infarction.     SOCIAL HISTORY:  Patient quit s retention. 4.   Severe hypokalemia and hypomagnesemia. 5.   History of hypertension. 6.   History of multiple pelvic cancers. PLAN:  Patient will be admitted to the general med/surg floor, and neurosurgical and physiatry consultations obtained.   Will

## 2020-02-14 NOTE — ED INITIAL ASSESSMENT (HPI)
Patient brought in by EMS for c/o lower back pain x 3.5 weeks. Diagnosed with sciatica--given muscle relaxers. Patient states pain radiates down right leg. Pt took 1 norco pta with some relief, but states she has not had any back imaging done.  Patient has

## 2020-02-15 LAB
ALBUMIN SERPL-MCNC: 2.9 G/DL (ref 3.4–5)
ANION GAP SERPL CALC-SCNC: 4 MMOL/L (ref 0–18)
BUN BLD-MCNC: 17 MG/DL (ref 7–18)
BUN/CREAT SERPL: 19.8 (ref 10–20)
C DIFF TOX B STL QL: NEGATIVE
CALCIUM BLD-MCNC: 8.7 MG/DL (ref 8.5–10.1)
CHLORIDE SERPL-SCNC: 113 MMOL/L (ref 98–112)
CO2 SERPL-SCNC: 23 MMOL/L (ref 21–32)
CREAT BLD-MCNC: 0.86 MG/DL (ref 0.55–1.02)
DEPRECATED HBV CORE AB SER IA-ACNC: 1532.7 NG/ML (ref 18–340)
GLUCOSE BLD-MCNC: 110 MG/DL (ref 70–99)
IRON SATURATION: 18 % (ref 15–50)
IRON SERPL-MCNC: 44 UG/DL (ref 50–170)
OSMOLALITY SERPL CALC.SUM OF ELEC: 292 MOSM/KG (ref 275–295)
PHOSPHATE SERPL-MCNC: 1.5 MG/DL (ref 2.5–4.9)
POTASSIUM SERPL-SCNC: 4.7 MMOL/L (ref 3.5–5.1)
SODIUM SERPL-SCNC: 140 MMOL/L (ref 136–145)
TOTAL IRON BINDING CAPACITY: 250 UG/DL (ref 240–450)
TRANSFERRIN SERPL-MCNC: 168 MG/DL (ref 200–360)

## 2020-02-15 PROCEDURE — 99222 1ST HOSP IP/OBS MODERATE 55: CPT | Performed by: PHYSICAL MEDICINE & REHABILITATION

## 2020-02-15 RX ORDER — METHYLPREDNISOLONE 4 MG/1
4 TABLET ORAL
Status: DISCONTINUED | OUTPATIENT
Start: 2020-02-20 | End: 2020-02-17

## 2020-02-15 RX ORDER — METHYLPREDNISOLONE 4 MG/1
4 TABLET ORAL 2 TIMES DAILY
Status: DISCONTINUED | OUTPATIENT
Start: 2020-02-19 | End: 2020-02-17

## 2020-02-15 RX ORDER — METHYLPREDNISOLONE 4 MG/1
4 TABLET ORAL
Status: COMPLETED | OUTPATIENT
Start: 2020-02-16 | End: 2020-02-16

## 2020-02-15 RX ORDER — METHYLPREDNISOLONE 4 MG/1
4 TABLET ORAL
Status: DISCONTINUED | OUTPATIENT
Start: 2020-02-18 | End: 2020-02-17

## 2020-02-15 RX ORDER — GABAPENTIN 100 MG/1
100 CAPSULE ORAL 3 TIMES DAILY
Status: DISCONTINUED | OUTPATIENT
Start: 2020-02-15 | End: 2020-02-18

## 2020-02-15 RX ORDER — METHYLPREDNISOLONE 4 MG/1
8 TABLET ORAL
Status: COMPLETED | OUTPATIENT
Start: 2020-02-15 | End: 2020-02-15

## 2020-02-15 RX ORDER — METHYLPREDNISOLONE 4 MG/1
8 TABLET ORAL
Status: COMPLETED | OUTPATIENT
Start: 2020-02-16 | End: 2020-02-16

## 2020-02-15 RX ORDER — HYDROCODONE BITARTRATE AND ACETAMINOPHEN 5; 325 MG/1; MG/1
1 TABLET ORAL EVERY 6 HOURS PRN
Status: DISCONTINUED | OUTPATIENT
Start: 2020-02-15 | End: 2020-02-18

## 2020-02-15 RX ORDER — METHYLPREDNISOLONE 4 MG/1
4 TABLET ORAL
Status: COMPLETED | OUTPATIENT
Start: 2020-02-15 | End: 2020-02-15

## 2020-02-15 RX ORDER — POTASSIUM CHLORIDE 20 MEQ/1
20 TABLET, EXTENDED RELEASE ORAL DAILY
Status: DISCONTINUED | OUTPATIENT
Start: 2020-02-16 | End: 2020-02-16

## 2020-02-15 RX ORDER — METHYLPREDNISOLONE 4 MG/1
4 TABLET ORAL
Status: DISCONTINUED | OUTPATIENT
Start: 2020-02-17 | End: 2020-02-17

## 2020-02-15 NOTE — PROGRESS NOTES
[facility]    Progress Note    Charanjit Thornton Patient Status:  Inpatient    1940 MRN [id number]   Location [facility] 4W/SW/SE Attending Wing Basilio MD   Hosp Day # 1 PCP Annika Vega MD       Subjective:   Alcides Doty 1.24 (H) 0.55 - 1.02 mg/dL Final   11/05/2018 1.14 (H) 0.55 - 1.02 mg/dL Final   10/16/2018 1.11 (H) 0.55 - 1.02 mg/dL Final   10/05/2018 1.09 (H) 0.55 - 1.02 mg/dL Final     GFR, -American   Date Value Ref Range Status   02/15/2020 74 >=60 Final Levothyroxine Sodium  137 mcg Oral Before breakfast   • losartan  100 mg Oral Daily   • Pantoprazole Sodium  40 mg Oral QAM AC   • rifaximin  550 mg Oral TID   • Sertraline HCl  100 mg Oral Daily   • enoxaparin  40 mg Subcutaneous Daily       Continuous In

## 2020-02-15 NOTE — CONSULTS
[facility] - [facility]    Neurosurgery Consultation    Evgenyjud Ryan Patient Status:  Inpatient    1940 MRN [id number]   Location [facility] 4W/SW/SE Attending Jaki Bradford MD   Hosp Day # 1 PCP Sindy Manning MD     Date of Ad Cancer Sister       reports that she has quit smoking. She has never used smokeless tobacco. She reports current alcohol use. She reports that she does not use drugs.     Allergies:    Compazine [Prochlor*    OTHER (SEE COMMENTS)    Comment:Pt does not reca 137 mcg, Oral, Before breakfast  •  losartan Potassium (COZAAR) tab 100 mg, 100 mg, Oral, Daily  •  ondansetron (ZOFRAN-ODT) disintegrating tab 8 mg, 8 mg, Oral, Q8H PRN  •  Pantoprazole Sodium (PROTONIX) EC tab 40 mg, 40 mg, Oral, QAM AC  •  rifaximin (XI facet degeneration at other lumbar levels as described. 6. Marked distention of the urinary bladder. 7. Edema in the perirectal soft tissue seen at the edge of the field of view probably reflecting post radiation changes. 8. Colonic diverticulosis.     Dict

## 2020-02-15 NOTE — CONSULTS
[address] [facility]  NEW PATIENT EVALUATION    Consultation as a request of Dr. Alesia Mark    Chief Complaint: back pain.     HISTORY OF PRESENT ILLNESS:   Patient presents with:  Back Pain      The patient is a 78 year ol endorses that today her pain is 0 out of 10 while seated but when she tries to sit up or stand and walk the pain can be 10 out of 10. Neurosurgery has been consulted as well. Patient slept approximately 4 to 6 hours last night.   She lives alone with no s tab 4 mg, 4 mg, Oral, BID  [START ON 2/20/2020] methylPREDNISolone (MEDROL) tab 4 mg, 4 mg, Oral, Daily with breakfast  gabapentin (NEURONTIN) cap 100 mg, 100 mg, Oral, TID  HYDROcodone-acetaminophen (NORCO) 5-325 MG per tab 1 tablet, 1 tablet, Oral, Q6H P the reaction  Sulfa Antibiotics       RASH      FAMILY HISTORY:     Family History   Problem Relation Age of Onset   • Cancer Sister           SOCIAL HISTORY:   Social History    Tobacco Use      Smoking status: Former Smoker      Smokeless tobacco: Never deformity. Palpation: Non tender to palpation of the spinous process, lumbar paraspinal muscles.   ROM: Could not assess due to pain  Strength: 5/5 in bilateral lower extremities  Sensation: Intact to light touch in all dermatomes of the lower extremities probably reflecting post radiation changes. 8. Colonic diverticulosis. All imaging results were reviewed and discussed with patient. ASSESSMENT:     1. Hypomagnesemia    2. Hypokalemia    3.  Severe back pain        Arlon Brjorge luis is a pleasant agreement. All questions/concerns were addressed and there were no barriers to learning. [place] [address] [facility]  Physical Medicine and Rehabilitation/Sports Medicine

## 2020-02-15 NOTE — PROGRESS NOTES
02/15/20 1350   Clinical Encounter Type   Visited With Patient   Routine Visit Introduction   Continue Visiting Yes   Anabaptist Encounters   Anabaptist Needs Prayer   Patient Spiritual Encounters   Spiritual Assessment Completed Yes  (pt likes to talk, s

## 2020-02-16 DIAGNOSIS — M48.00 SPINAL STENOSIS: Primary | ICD-10-CM

## 2020-02-16 LAB
ANION GAP SERPL CALC-SCNC: 2 MMOL/L (ref 0–18)
BUN BLD-MCNC: 22 MG/DL (ref 7–18)
BUN/CREAT SERPL: 22.2 (ref 10–20)
CALCIUM BLD-MCNC: 8.8 MG/DL (ref 8.5–10.1)
CHLORIDE SERPL-SCNC: 112 MMOL/L (ref 98–112)
CO2 SERPL-SCNC: 27 MMOL/L (ref 21–32)
CREAT BLD-MCNC: 0.99 MG/DL (ref 0.55–1.02)
GLUCOSE BLD-MCNC: 130 MG/DL (ref 70–99)
OSMOLALITY SERPL CALC.SUM OF ELEC: 297 MOSM/KG (ref 275–295)
PHOSPHATE SERPL-MCNC: 2.1 MG/DL (ref 2.5–4.9)
POTASSIUM SERPL-SCNC: 5.2 MMOL/L (ref 3.5–5.1)
SODIUM SERPL-SCNC: 141 MMOL/L (ref 136–145)

## 2020-02-16 RX ORDER — MAGNESIUM OXIDE 400 MG (241.3 MG MAGNESIUM) TABLET
200 TABLET 2 TIMES DAILY WITH MEALS
Status: DISCONTINUED | OUTPATIENT
Start: 2020-02-16 | End: 2020-02-18

## 2020-02-16 NOTE — CM/SW NOTE
MDO received for dc planning to snf, possibly [employer] or [employer]. Referral sent by ISR via [employer]. Call placed to [facility] voicemail to request the DON screen. Possible dc Mon 2/17.     Jared Mcghee [facility], [place], [address] [facility], [address]..

## 2020-02-16 NOTE — PLAN OF CARE
Pt's vital signs within normal limits. Denies pain at this time. Receiving Medrol and PRN Norco. Alert and oriented x4. States having intermittent numbness/tingling to RLE. Tele #67 in place, NSR. On room air. Tolerating general diet.  Hx of loose stools pe period  Description  INTERVENTIONS  - Monitor WBC  - Administer growth factors as ordered  - Implement neutropenic guidelines  Outcome: Progressing     Problem: SAFETY ADULT - FALL  Goal: Free from fall injury  Description  INTERVENTIONS:  - Assess pt freq

## 2020-02-16 NOTE — PHYSICAL THERAPY NOTE
PHYSICAL THERAPY EVALUATION - INPATIENT     Room Number: 406/406-A  Evaluation Date: 2/16/2020  Type of Evaluation: Initial   Physician Order: PT Eval and Treat    Presenting Problem: LBP, sciatica  Reason for Therapy: Mobility Dysfunction and Discharge P training  Rehab Potential : Good  Frequency (Obs): Daily       PHYSICAL THERAPY MEDICAL/SOCIAL HISTORY     History related to current admission: LBP, sciatica.      Problem List  Principal Problem:    Hypomagnesemia  Active Problems:    Hypokalemia    Sever extremity ROM is within functional limits. Lower extremity strength is within functional limits.     BALANCE  Static Sitting: Fair -  Dynamic Sitting: Fair -  Static Standing: Poor  Dynamic Standing: Poor    ACTIVITY TOLERANCE  Pulse: 73        BP: 155/8 #3   Current Status    Goal #4 Pt will complete 10-15 minutes of appropriate exercises in her hospital room.     Goal #4   Current Status

## 2020-02-16 NOTE — PROGRESS NOTES
[facility]    Progress Note    Lorena Patton Patient Status:  Inpatient    1940 MRN [id number]   Location [facility] 4W/SW/SE Attending Raymond Wolf MD   Hosp Day # 2 PCP Wilfrido Box MD       Subjective:   Tacho Naidu 113*   CO2 25.0 23.0       No results for input(s): LIP, VIPUL in the last 168 hours. No results for input(s): TROP, CK in the last 168 hours. No results for input(s): PT, INR in the last 168 hours.     No results for input(s): PGLU in the last 168 hour Date Value Ref Range Status   10/05/2018 372 150 - 450 10^3/uL Final     PLT   Date Value Ref Range Status   02/14/2020 275.0 150.0 - 450.0 10(3)uL Final   01/23/2020 194.0 150.0 - 450.0 10(3)uL Final   12/17/2019 184.0 150.0 - 450.0 10(3)uL Final   12/1 Adalberto Kawasaki, MD on 2/14/2020 at 13:18     Approved by (CST): Shirley Clark MD on 2/14/2020 at 13:28          Ekg 12-lead    Result Date: 2/14/2020  ECG Report  Interpretation  -------------------------- Sinus Rhythm -Inferior infarct -probably not recent -Old

## 2020-02-16 NOTE — PLAN OF CARE
Problem: PAIN - ADULT  Goal: Verbalizes/displays adequate comfort level or patient's stated pain goal  Description  INTERVENTIONS:  - Encourage pt to monitor pain and request assistance  - Assess pain using appropriate pain scale  - Administer analgesics barriers to discharge w/pt and caregiver  - Include patient/family/discharge partner in discharge planning  - Arrange for needed discharge resources and transportation as appropriate  - Identify discharge learning needs (meds, wound care, etc)  - Arrange f

## 2020-02-17 ENCOUNTER — TELEPHONE (OUTPATIENT)
Dept: HEMATOLOGY/ONCOLOGY | Facility: HOSPITAL | Age: 80
End: 2020-02-17

## 2020-02-17 ENCOUNTER — ANESTHESIA (OUTPATIENT)
Dept: SURGERY | Facility: HOSPITAL | Age: 80
End: 2020-02-17

## 2020-02-17 ENCOUNTER — ANESTHESIA EVENT (OUTPATIENT)
Dept: SURGERY | Facility: HOSPITAL | Age: 80
End: 2020-02-17

## 2020-02-17 ENCOUNTER — APPOINTMENT (OUTPATIENT)
Dept: GENERAL RADIOLOGY | Facility: HOSPITAL | Age: 80
DRG: 552 | End: 2020-02-17
Attending: PHYSICAL MEDICINE & REHABILITATION
Payer: MEDICARE

## 2020-02-17 LAB
ANION GAP SERPL CALC-SCNC: 5 MMOL/L (ref 0–18)
BUN BLD-MCNC: 26 MG/DL (ref 7–18)
BUN/CREAT SERPL: 28.6 (ref 10–20)
CALCIUM BLD-MCNC: 8.5 MG/DL (ref 8.5–10.1)
CHLORIDE SERPL-SCNC: 108 MMOL/L (ref 98–112)
CO2 SERPL-SCNC: 27 MMOL/L (ref 21–32)
CREAT BLD-MCNC: 0.91 MG/DL (ref 0.55–1.02)
GLUCOSE BLD-MCNC: 117 MG/DL (ref 70–99)
HAV IGM SER QL: 1.4 MG/DL (ref 1.6–2.6)
OSMOLALITY SERPL CALC.SUM OF ELEC: 296 MOSM/KG (ref 275–295)
PHOSPHATE SERPL-MCNC: 2.5 MG/DL (ref 2.5–4.9)
POTASSIUM SERPL-SCNC: 5 MMOL/L (ref 3.5–5.1)
SODIUM SERPL-SCNC: 140 MMOL/L (ref 136–145)

## 2020-02-17 PROCEDURE — 3E0S3BZ INTRODUCTION OF ANESTHETIC AGENT INTO EPIDURAL SPACE, PERCUTANEOUS APPROACH: ICD-10-PCS | Performed by: PHYSICAL MEDICINE & REHABILITATION

## 2020-02-17 PROCEDURE — 99232 SBSQ HOSP IP/OBS MODERATE 35: CPT | Performed by: PHYSICAL MEDICINE & REHABILITATION

## 2020-02-17 PROCEDURE — 3E0S33Z INTRODUCTION OF ANTI-INFLAMMATORY INTO EPIDURAL SPACE, PERCUTANEOUS APPROACH: ICD-10-PCS | Performed by: PHYSICAL MEDICINE & REHABILITATION

## 2020-02-17 RX ORDER — LIDOCAINE HYDROCHLORIDE 10 MG/ML
INJECTION, SOLUTION EPIDURAL; INFILTRATION; INTRACAUDAL; PERINEURAL AS NEEDED
Status: DISCONTINUED | OUTPATIENT
Start: 2020-02-17 | End: 2020-02-17 | Stop reason: HOSPADM

## 2020-02-17 RX ORDER — 0.9 % SODIUM CHLORIDE 0.9 %
VIAL (ML) INJECTION AS NEEDED
Status: DISCONTINUED | OUTPATIENT
Start: 2020-02-17 | End: 2020-02-17 | Stop reason: HOSPADM

## 2020-02-17 RX ORDER — BETAMETHASONE SODIUM PHOSPHATE AND BETAMETHASONE ACETATE 3; 3 MG/ML; MG/ML
INJECTION, SUSPENSION INTRA-ARTICULAR; INTRALESIONAL; INTRAMUSCULAR; SOFT TISSUE AS NEEDED
Status: DISCONTINUED | OUTPATIENT
Start: 2020-02-17 | End: 2020-02-17 | Stop reason: HOSPADM

## 2020-02-17 RX ORDER — GABAPENTIN 100 MG/1
100 CAPSULE ORAL 3 TIMES DAILY
Qty: 90 CAPSULE | Refills: 0 | Status: SHIPPED | OUTPATIENT
Start: 2020-02-17 | End: 2020-02-18

## 2020-02-17 RX ORDER — HYDROCODONE BITARTRATE AND ACETAMINOPHEN 5; 325 MG/1; MG/1
1 TABLET ORAL EVERY 6 HOURS PRN
Qty: 30 TABLET | Refills: 0 | Status: SHIPPED | OUTPATIENT
Start: 2020-02-17 | End: 2020-02-18

## 2020-02-17 NOTE — TELEPHONE ENCOUNTER
Alessandra Walters called and was looking to speak with Roman Uriarte about a couple medications that she is on. She asked that she please get a call back.

## 2020-02-17 NOTE — PROGRESS NOTES
[address]  NEW PATIENT EVALUATION      Chief Complaint: back pain.     HISTORY OF PRESENT ILLNESS:   Patient presents with:  Back Pain      The patient is a 78year old female with significant past medical h Past Surgical History:   Procedure Laterality Date   • APPENDECTOMY     • BOWEL RESECTION  6/16/15    • CHOLECYSTECTOMY     • FLEXIBLE SIGMOIDOSCOPY N/A 8/14/2019    Performed by Marley Diaz MD at [address] [place] [address] ENDOSCOPY   • HERNIA SURGERY  6/16/15     ventr MMOL/15ML injection 15 mL, 15 mL, Intravenous, ONCE PRN  ALPRAZolam (XANAX) tab 0.25 mg, 0.25 mg, Oral, BID PRN  atenolol (TENORMIN) tab 50 mg, 50 mg, Oral, Daily  diphenhydrAMINE (BENADRYL) cap/tab 25 mg, 25 mg, Oral, Q4H PRN  diphenoxylate-atropine (LOMO positives and negatives noted in the the HPI.         PHYSICAL EXAM:   /59 (BP Location: Right arm)   Pulse 73   Temp 98.4 °F (36.9 °C) (Oral)   Resp 16   Ht 59\"   Wt 130 lb (59 kg)   SpO2 92%   Breastfeeding No   BMI 26.26 kg/m²   General: No immedi 01/23/2020    ALT 13 01/23/2020    BILT 0.3 01/23/2020    TP 7.2 01/23/2020    ALB 2.9 (L) 02/15/2020    GLOBULIN 3.6 01/23/2020     02/17/2020    K 5.0 02/17/2020     02/17/2020    CO2 27.0 02/17/2020     Lab Results   Component Value Date as we will be performing the injection. I have recommended that the patient continue the Norco medication as needed as well as gabapentin medication 100 mg 3 times daily.   I would like the patient to follow-up with me in 2 weeks in the office so we can [facility]

## 2020-02-17 NOTE — H&P
[address] Patient Status:  Inpatient    1940 MRN [id number]   [place] [address] Attending Hien Tejada MD   Hosp Day # 3 PCP Trent Houser MD     Date of Adm BY MOUTH 2 (TWO) TIMES DAILY. , Disp: 360 tablet, Rfl: 0, Past Month at Unknown time  ALPRAZolam 0.25 MG Oral Tab, TAKE 1 TABLET BY MOUTH TWICE DAILY AS NEEDED, Disp: 60 tablet, Rfl: 0, Past Week at Unknown time  DIPHENOXYLATE-ATROPINE 2.5-0.025 MG Oral Tab 1000 MCG/ML Injection Solution, Inject 1 mL (1,000 mcg total) into the skin every 30 (thirty) days. , Disp: , Rfl: 0, 1/10/2020        Physical Exam:   General: Alert, orientated x3. Cooperative. No apparent distress.   Vital Signs:  Blood pressure 145/76, Danielle Vega  2/17/2020  4:54 PM

## 2020-02-17 NOTE — PLAN OF CARE
Pt's vital signs within normal limits. Refusing pain meds at this time. Has been taking PRN Norco. Alert and oriented x4. States having intermittent numbness/tingling to LLE. Tele #67 in place, NSR. On room air. Tolerating general diet. Voids freely.  Up wi period  Description  INTERVENTIONS  - Monitor WBC  - Administer growth factors as ordered  - Implement neutropenic guidelines  Outcome: Progressing     Problem: SAFETY ADULT - FALL  Goal: Free from fall injury  Description  INTERVENTIONS:  - Assess pt freq

## 2020-02-17 NOTE — TELEPHONE ENCOUNTER
Madison Adan called with questions regarding the medication she is on. She would like to speak with Karla Mayo. She says she will not be home to take a call until after 2:00. This is all the information the patient was willing to give.  Please call

## 2020-02-17 NOTE — PROGRESS NOTES
[facility]    Progress Note    Vince Hinojosadomenico Patient Status:  Inpatient    1940 MRN [id number]   Location [facility] 4W/SW/SE Attending Joby Vazquez MD   Hosp Day # 3 PCP Berenice Cornell MD       Subjective:   Silvestre Degree 02/16/20  0501 02/17/20  0507   * 130* 117*   BUN 17 22* 26*   CREATSERUM 0.86 0.99 0.91   GFRAA 74 63 69   GFRNAA 64 54* 60   CA 8.7 8.8 8.5   ALB 2.9*  --   --     141 140   K 4.7 5.2* 5.0   * 112 108   CO2 23.0 27.0 27.0       No resu Final   11/06/2019 8.1 (L) 12.0 - 16.0 g/dL Final   10/31/2019 7.1 (L) 12.0 - 16.0 g/dL Final     Hemoglobin   Date Value Ref Range Status   10/05/2018 7.9 (L) 12.0 - 16.0 g/dL Final     Platelet Count   Date Value Ref Range Status   10/05/2018 372 150 - 4

## 2020-02-17 NOTE — PHYSICAL THERAPY NOTE
PHYSICAL THERAPY TREATMENT NOTE - INPATIENT     Room Number: 406/406-A       Presenting Problem: LBP, sciatica    Problem List  Principal Problem:    Hypomagnesemia  Active Problems:    Hypokalemia    Severe back pain      PHYSICAL THERAPY ASSESSMENT     P help from another person does the patient currently need. ..   -   Moving to and from a bed to a chair (including a wheelchair)?: A Little   -   Need to walk in hospital room?: A Little   -   Climbing 3-5 steps with a railing?: A Lot     AM-PAC Score:  Raw

## 2020-02-17 NOTE — DISCHARGE SUMMARY
Outpatient Surgery Brief Discharge Summary         Patient ID:  Charanjit Thornton  [id number]  78year old  11/24/1940    Discharge Diagnoses: Spinal stenosis [M48.00]    Procedures: Caudal Epidural Steroid Injection under fluoroscopy guidance under local

## 2020-02-17 NOTE — PLAN OF CARE
Problem: PAIN - ADULT  Goal: Verbalizes/displays adequate comfort level or patient's stated pain goal  Description  INTERVENTIONS:  - Encourage pt to monitor pain and request assistance  - Assess pain using appropriate pain scale  - Administer analgesics Problem: DISCHARGE PLANNING  Goal: Discharge to home or other facility with appropriate resources  Description  INTERVENTIONS:  - Identify barriers to discharge w/pt and caregiver  - Include patient/family/discharge partner in discharge Bandar Stallworth

## 2020-02-18 ENCOUNTER — SNF VISIT (OUTPATIENT)
Dept: INTERNAL MEDICINE CLINIC | Facility: SKILLED NURSING FACILITY | Age: 80
End: 2020-02-18

## 2020-02-18 VITALS
WEIGHT: 130 LBS | HEIGHT: 59 IN | RESPIRATION RATE: 16 BRPM | BODY MASS INDEX: 26.21 KG/M2 | TEMPERATURE: 98 F | DIASTOLIC BLOOD PRESSURE: 72 MMHG | OXYGEN SATURATION: 96 % | HEART RATE: 61 BPM | SYSTOLIC BLOOD PRESSURE: 131 MMHG

## 2020-02-18 DIAGNOSIS — R53.1 WEAKNESS: ICD-10-CM

## 2020-02-18 DIAGNOSIS — M54.50 ACUTE BILATERAL LOW BACK PAIN, UNSPECIFIED WHETHER SCIATICA PRESENT: ICD-10-CM

## 2020-02-18 DIAGNOSIS — D64.9 ANEMIA, UNSPECIFIED TYPE: ICD-10-CM

## 2020-02-18 PROCEDURE — 1123F ACP DISCUSS/DSCN MKR DOCD: CPT | Performed by: NURSE PRACTITIONER

## 2020-02-18 PROCEDURE — 99232 SBSQ HOSP IP/OBS MODERATE 35: CPT | Performed by: PHYSICAL MEDICINE & REHABILITATION

## 2020-02-18 PROCEDURE — 99309 SBSQ NF CARE MODERATE MDM 30: CPT | Performed by: NURSE PRACTITIONER

## 2020-02-18 RX ORDER — GABAPENTIN 100 MG/1
100 CAPSULE ORAL 3 TIMES DAILY
Qty: 90 CAPSULE | Refills: 0 | Status: SHIPPED | OUTPATIENT
Start: 2020-02-18 | End: 2020-03-03

## 2020-02-18 RX ORDER — MAGNESIUM OXIDE 400 MG (241.3 MG MAGNESIUM) TABLET
200 TABLET DAILY
Qty: 30 TABLET | Refills: 2 | Status: ON HOLD | OUTPATIENT
Start: 2020-02-18 | End: 2021-04-05

## 2020-02-18 RX ORDER — HYDROCODONE BITARTRATE AND ACETAMINOPHEN 5; 325 MG/1; MG/1
1 TABLET ORAL EVERY 6 HOURS PRN
Qty: 60 TABLET | Refills: 0 | Status: SHIPPED | OUTPATIENT
Start: 2020-02-18 | End: 2020-03-03

## 2020-02-18 RX ORDER — ALPRAZOLAM 0.25 MG/1
TABLET ORAL
Qty: 30 TABLET | Refills: 0 | Status: ON HOLD | OUTPATIENT
Start: 2020-02-18 | End: 2020-04-16

## 2020-02-18 NOTE — CM/SW NOTE
[facility] for [facility] today. Notified Bess[facility]/ [facility], bed available. Awaiting c/b regarding pickup time.     1015am: Per Woodrow's request, arranged Laura Cassette with Naila Alfaro ([phone]) for 1pm. Confirmed plan wi

## 2020-02-18 NOTE — PHYSICAL THERAPY NOTE
PHYSICAL THERAPY TREATMENT NOTE - INPATIENT     Room Number: 406/406-A       Presenting Problem: LBP, sciatica    Problem List  Principal Problem:    Hypomagnesemia  Active Problems:    Hypokalemia    Severe back pain      PHYSICAL THERAPY ASSESSMENT     P side of the bed?: A Little   How much help from another person does the patient currently need. ..   -   Moving to and from a bed to a chair (including a wheelchair)?: A Little   -   Need to walk in hospital room?: A Little   -   Climbing 3-5 steps with a r

## 2020-02-18 NOTE — PROGRESS NOTES
[facility]    Progress Note    Jakob Chin Patient Status:  Inpatient    1940 MRN [id number]   Location [facility] 4W/SW/SE Attending Zen Ortiz MD   Hosp Day # 4 PCP Kris Jerome MD       Subjective:   Jayla Newton Recent Labs   Lab 02/14/20  0951   RBC 2.85*   HGB 9.8*   HCT 29.1*   .1*   MCH 34.4*   MCHC 33.7   RDW 12.2   NEPRELIM 3.31   WBC 4.8   .0       Recent Labs   Lab 02/15/20  0526 02/16/20  0501 02/17/20  0507   * 130* 117*   BUN 17 Value Ref Range Status   02/14/2020 9.8 (L) 12.0 - 16.0 g/dL Final   01/23/2020 8.7 (L) 12.0 - 16.0 g/dL Final   12/17/2019 8.2 (L) 12.0 - 16.0 g/dL Final   12/11/2019 6.7 (LL) 12.0 - 16.0 g/dL Final   11/06/2019 8.1 (L) 12.0 - 16.0 g/dL Final   10/31/2019

## 2020-02-18 NOTE — PLAN OF CARE
Patient resting, discomfort d/t earlier corticosteroid injection into lumbar back for intractable back pain, voiding via bedpan, tolerating po pain medication of norco, up with one and walker, cms intact bilaterally, alert and oriented, plan to d/c to reha pain  - Anticipate increased pain with activity and pre-medicate as appropriate  Outcome: Progressing     Problem: RISK FOR INFECTION - ADULT  Goal: Absence of fever/infection during anticipated neutropenic period  Description  INTERVENTIONS  - Monitor WBC

## 2020-02-18 NOTE — PLAN OF CARE
Problem: Patient Centered Care  Goal: Patient preferences are identified and integrated in the patient's plan of care  Description  Interventions:  - What would you like us to know as we care for you?   - Provide timely, complete, and accurate informatio factors as ordered  - Implement neutropenic guidelines  Outcome: Adequate for Discharge     Problem: SAFETY ADULT - FALL  Goal: Free from fall injury  Description  INTERVENTIONS:  - Assess pt frequently for physical needs  - Identify cognitive and physical

## 2020-02-18 NOTE — PROGRESS NOTES
[facility]  FOLLOW UP EVALUATION      Chief Complaint: back pain.     HISTORY OF PRESENT ILLNESS:   Patient presents with:  Back Pain      The patient is a 78year old female with significant past medical his Ureter cancer [facility])          PAST SURGICAL HISTORY:     Past Surgical History:   Procedure Laterality Date   • APPENDECTOMY     • BOWEL RESECTION  6/16/15    • CHOLECYSTECTOMY     • FLEXIBLE SIGMOIDOSCOPY N/A 8/14/2019    Performed by Neal Kapadia MD at [facility]  [COMPLETED] Gadoterate Meglumine (DOTAREM) 7.5 MMOL/15ML injection 15 mL, 15 mL, Intravenous, ONCE PRN  ALPRAZolam (XANAX) tab 0.25 mg, 0.25 mg, Oral, BID PRN  atenolol (TENORMIN) tab 50 mg, 50 mg, Oral, Daily  diphenhydrAMINE (BENADRYL) cap/tab 25 m positives and negatives noted in the the HPI.         PHYSICAL EXAM:   /72 (BP Location: Right arm)   Pulse 61   Temp 97.9 °F (36.6 °C) (Oral)   Resp 16   Ht 59\"   Wt 130 lb (59 kg)   SpO2 96%   Breastfeeding No   BMI 26.26 kg/m²   General: No immedi GFRNAA 60 02/17/2020    GFRAA 69 02/17/2020    CA 8.5 02/17/2020    OSMOCALC 296 (H) 02/17/2020    ALKPHO 83 01/23/2020    AST 14 (L) 01/23/2020    ALT 13 01/23/2020    BILT 0.3 01/23/2020    TP 7.2 01/23/2020    ALB 2.9 (L) 02/15/2020    GLOBULIN 3.6 01/2 discharged to subacute rehab. I recommend that she follow-up with me in 2 weeks in the office for further evaluation.   I recommend that she continue her Norco and gabapentin medication as well as other home meds and we will discuss medication management i

## 2020-02-18 NOTE — PROGRESS NOTES
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

## 2020-02-18 NOTE — TELEPHONE ENCOUNTER
Returned call to patient, she reports she is in the hospital with back issues, and will be going to [facility] for rehab. Cancelled her follow up with Dr Nba Le for 2/20.   Says she had an MRI on 2/14, wants to know if she still needs to have the MRI that

## 2020-02-20 ENCOUNTER — APPOINTMENT (OUTPATIENT)
Dept: HEMATOLOGY/ONCOLOGY | Facility: HOSPITAL | Age: 80
End: 2020-02-20
Attending: RADIOLOGY
Payer: MEDICARE

## 2020-02-21 ENCOUNTER — SNF VISIT (OUTPATIENT)
Dept: INTERNAL MEDICINE CLINIC | Facility: SKILLED NURSING FACILITY | Age: 80
End: 2020-02-21

## 2020-02-21 DIAGNOSIS — R53.1 WEAKNESS: ICD-10-CM

## 2020-02-21 DIAGNOSIS — M48.062 SPINAL STENOSIS OF LUMBAR REGION WITH NEUROGENIC CLAUDICATION: ICD-10-CM

## 2020-02-21 DIAGNOSIS — R52 PAIN: ICD-10-CM

## 2020-02-21 PROCEDURE — 99308 SBSQ NF CARE LOW MDM 20: CPT | Performed by: NURSE PRACTITIONER

## 2020-02-21 NOTE — PROGRESS NOTES
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 Denies injury. This improved after norco but is worse today. Says she \"overdid\" it in therapy. Her symptoms overall have improved with the MADHAVI.  She is requesting pain

## 2020-02-28 ENCOUNTER — SNF VISIT (OUTPATIENT)
Dept: INTERNAL MEDICINE CLINIC | Facility: SKILLED NURSING FACILITY | Age: 80
End: 2020-02-28

## 2020-02-28 DIAGNOSIS — M48.061 SPINAL STENOSIS OF LUMBAR REGION, UNSPECIFIED WHETHER NEUROGENIC CLAUDICATION PRESENT: ICD-10-CM

## 2020-02-28 DIAGNOSIS — R53.1 WEAKNESS: ICD-10-CM

## 2020-02-28 DIAGNOSIS — D50.8 OTHER IRON DEFICIENCY ANEMIA: ICD-10-CM

## 2020-02-28 PROCEDURE — 99308 SBSQ NF CARE LOW MDM 20: CPT | Performed by: NURSE PRACTITIONER

## 2020-02-28 NOTE — PROGRESS NOTES
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 2/14/20  with low back pain with radiation to bilateral extrem Reviewed     SUBJECTIVE/REVIEW OF SYSTEMS: Reports improved mobility and pain. Denies sob/cough/cp/palpitations. Denies hematuria/dysuria/frequency. Denies constipation.        PHYSICAL EXAM:  GENERAL HEALTH: NAD  HEENT: atraumatic/normocephalic, mucous

## 2020-03-02 ENCOUNTER — TELEPHONE (OUTPATIENT)
Dept: HEMATOLOGY/ONCOLOGY | Facility: HOSPITAL | Age: 80
End: 2020-03-02

## 2020-03-02 NOTE — TELEPHONE ENCOUNTER
Jason Garcia called to speak with Tucker Boss because she is mixed up on some of her medications and appointments. She seems confused.  Please call

## 2020-03-03 ENCOUNTER — TELEPHONE (OUTPATIENT)
Dept: NEUROLOGY | Facility: CLINIC | Age: 80
End: 2020-03-03

## 2020-03-03 ENCOUNTER — OFFICE VISIT (OUTPATIENT)
Dept: NEUROLOGY | Facility: CLINIC | Age: 80
End: 2020-03-03
Payer: MEDICARE

## 2020-03-03 VITALS
WEIGHT: 138 LBS | RESPIRATION RATE: 18 BRPM | HEART RATE: 62 BPM | BODY MASS INDEX: 27.82 KG/M2 | HEIGHT: 59 IN | SYSTOLIC BLOOD PRESSURE: 110 MMHG | DIASTOLIC BLOOD PRESSURE: 68 MMHG

## 2020-03-03 DIAGNOSIS — M48.061 SPINAL STENOSIS AT L4-L5 LEVEL: Primary | ICD-10-CM

## 2020-03-03 DIAGNOSIS — Z85.51 HISTORY OF BLADDER CANCER: ICD-10-CM

## 2020-03-03 DIAGNOSIS — S22.080D COMPRESSION FRACTURE OF T11 VERTEBRA WITH ROUTINE HEALING, SUBSEQUENT ENCOUNTER: ICD-10-CM

## 2020-03-03 DIAGNOSIS — F41.9 ANXIETY: ICD-10-CM

## 2020-03-03 DIAGNOSIS — M47.816 SPONDYLOSIS OF LUMBAR JOINT: ICD-10-CM

## 2020-03-03 DIAGNOSIS — C56.9 MALIGNANT NEOPLASM OF OVARY, UNSPECIFIED LATERALITY (HCC): ICD-10-CM

## 2020-03-03 DIAGNOSIS — C20 RECTAL CANCER (HCC): ICD-10-CM

## 2020-03-03 DIAGNOSIS — M43.10 ANTEROLISTHESIS: ICD-10-CM

## 2020-03-03 PROBLEM — S22.000D COMPRESSION FRACTURE OF THORACIC VERTEBRA WITH ROUTINE HEALING: Status: ACTIVE | Noted: 2020-03-03

## 2020-03-03 PROCEDURE — 99214 OFFICE O/P EST MOD 30 MIN: CPT | Performed by: PHYSICAL MEDICINE & REHABILITATION

## 2020-03-03 RX ORDER — ACETAMINOPHEN 325 MG/1
325 TABLET ORAL EVERY 6 HOURS PRN
Status: ON HOLD | COMMUNITY
End: 2020-04-17

## 2020-03-03 RX ORDER — GABAPENTIN 300 MG/1
CAPSULE ORAL
Qty: 90 CAPSULE | Refills: 0 | Status: SHIPPED | OUTPATIENT
Start: 2020-03-03 | End: 2020-03-27

## 2020-03-03 RX ORDER — DOCUSATE SODIUM 100 MG/1
100 CAPSULE, LIQUID FILLED ORAL 2 TIMES DAILY
COMMUNITY

## 2020-03-03 RX ORDER — MELATONIN
325
COMMUNITY
End: 2020-08-06

## 2020-03-03 RX ORDER — HYDROCODONE BITARTRATE AND ACETAMINOPHEN 5; 325 MG/1; MG/1
1 TABLET ORAL EVERY 8 HOURS PRN
Qty: 90 TABLET | Refills: 0 | Status: ON HOLD | OUTPATIENT
Start: 2020-03-03 | End: 2020-04-16

## 2020-03-03 RX ORDER — POLYETHYLENE GLYCOL 3350 17 G/17G
17 POWDER, FOR SOLUTION ORAL DAILY
Status: ON HOLD | COMMUNITY
End: 2021-04-05

## 2020-03-03 RX ORDER — CYCLOBENZAPRINE HCL 5 MG
5 TABLET ORAL 3 TIMES DAILY PRN
COMMUNITY
End: 2020-08-06

## 2020-03-03 RX ORDER — OMEPRAZOLE 20 MG/1
20 CAPSULE, DELAYED RELEASE ORAL
COMMUNITY
End: 2020-08-06

## 2020-03-03 NOTE — PATIENT INSTRUCTIONS
-Norco as needed for pain  -Tylenol in between as needed  -Gabapentin 300mg at night time and slowly increase to 3x daily if no drowsiness  -My office will call to set up injection once approved  -Follow up 4 weeks after injection

## 2020-03-03 NOTE — TELEPHONE ENCOUNTER
Caudal Epidural Steroid Injection under fluoroscopy guidance-APPROVED  Medicare Online for authorization of procedure Caudal Epidural Steroid Injection under fluoroscopy guidance  CPT codes 65949 Procedure is a covered benefit and does not require authoriz

## 2020-03-03 NOTE — TELEPHONE ENCOUNTER
Spoke with Ms Ciscoadams Yaya that it is our recommendation to complete MRI soft tissue pelvis when she felt physically able in next month, and to notify our [address] [place] [address] so that an appt with Dr Neida Hamilton could be made to review.  Reminded that she should eventually f/u wit

## 2020-03-03 NOTE — PROGRESS NOTES
[address]  FOLLOW UP EVALUATION      Chief Complaint: back pain. HISTORY OF PRESENT ILLNESS:   Patient presents with:  Low Back Pain: Patient present for follow up after hospital visit on 02/14/2020.  Was to be discharged with home therapy as well.   Patient denies any changes in sensation, strength or bowel bladder habits since last time I saw her in the hospital.      PAST MEDICAL HISTORY:     Past Medical History:   Diagnosis Date   • Anxiety state, unspe Take 100 mg by mouth daily.   , Disp: , Rfl:   DIPHENOXYLATE-ATROPINE 2.5-0.025 MG Oral Tab, TAKE 1-2 TABLETS BY MOUTH 4 (FOUR) TIMES DAILY AS NEEDED FOR DIARRHEA., Disp: 100 tablet, Rfl: 0  famoTIDine 40 MG Oral Tab, Take 1 tablet (40 mg total) by mouth ni Gastrointestinal  Bowel Incontinence: denies  Heartburn: denies  Abdominal Pain: denies  Blood in Stool : denies  Rectal Pain: denies   Hematology  Easy Bruising: denies  Easy Bleeding: denies   Genitourinary  Difficulty Urinating: denies  Bladder Incont lower extremities  Reflexes: 1/4 at L4 and S1  Straight leg raise: negative for radicular pain symptoms      LABS:     Lab Results   Component Value Date    A1C 6.3 (H) 03/23/2018     Lab Results   Component Value Date    WBC 11.2 (H) 02/19/2020    RBC 2.5 Compression fracture of T11 vertebra with routine healing, subsequent encounter    4. Spondylosis of lumbar joint    5. History of bladder cancer    6. Malignant neoplasm of ovary, unspecified laterality ([facility]    7. Rectal cancer ([facility] [address] [facility]    8.  Anxiety

## 2020-03-04 ENCOUNTER — APPOINTMENT (OUTPATIENT)
Dept: HEMATOLOGY/ONCOLOGY | Facility: HOSPITAL | Age: 80
End: 2020-03-04
Attending: RADIOLOGY
Payer: MEDICARE

## 2020-03-05 NOTE — TELEPHONE ENCOUNTER
Patient has been scheduled for a Caudal Epidural Steroid Injection under fluoroscopy guidance under local anesthesia on 03/09/20 at the [facility]. Medications and allergies reviewed.  Patient informed to hold aspirins, nsaids, blood thinners, multivitamins, tulio

## 2020-03-09 ENCOUNTER — OFFICE VISIT (OUTPATIENT)
Dept: SURGERY | Facility: CLINIC | Age: 80
End: 2020-03-09

## 2020-03-09 DIAGNOSIS — M48.061 SPINAL STENOSIS AT L4-L5 LEVEL: Primary | ICD-10-CM

## 2020-03-09 PROCEDURE — 62323 NJX INTERLAMINAR LMBR/SAC: CPT | Performed by: PHYSICAL MEDICINE & REHABILITATION

## 2020-03-09 PROCEDURE — 1111F DSCHRG MED/CURRENT MED MERGE: CPT | Performed by: PHYSICAL MEDICINE & REHABILITATION

## 2020-03-10 ENCOUNTER — TELEPHONE (OUTPATIENT)
Dept: HEMATOLOGY/ONCOLOGY | Facility: HOSPITAL | Age: 80
End: 2020-03-10

## 2020-03-10 ENCOUNTER — APPOINTMENT (OUTPATIENT)
Dept: HEMATOLOGY/ONCOLOGY | Facility: HOSPITAL | Age: 80
End: 2020-03-10
Attending: INTERNAL MEDICINE
Payer: MEDICARE

## 2020-03-16 ENCOUNTER — LAB REQUISITION (OUTPATIENT)
Dept: LAB | Facility: HOSPITAL | Age: 80
End: 2020-03-16
Payer: MEDICARE

## 2020-03-16 DIAGNOSIS — E11.9 TYPE 2 DIABETES MELLITUS WITHOUT COMPLICATIONS (HCC): ICD-10-CM

## 2020-03-16 DIAGNOSIS — D64.9 ANEMIA, UNSPECIFIED: ICD-10-CM

## 2020-03-16 DIAGNOSIS — I10 ESSENTIAL (PRIMARY) HYPERTENSION: ICD-10-CM

## 2020-03-16 LAB
ANION GAP SERPL CALC-SCNC: 5 MMOL/L (ref 0–18)
BASOPHILS # BLD AUTO: 0.05 X10(3) UL (ref 0–0.2)
BASOPHILS NFR BLD AUTO: 0.9 %
BUN BLD-MCNC: 30 MG/DL (ref 7–18)
BUN/CREAT SERPL: 28.8 (ref 10–20)
CALCIUM BLD-MCNC: 9.2 MG/DL (ref 8.5–10.1)
CHLORIDE SERPL-SCNC: 107 MMOL/L (ref 98–112)
CO2 SERPL-SCNC: 28 MMOL/L (ref 21–32)
CREAT BLD-MCNC: 1.04 MG/DL (ref 0.55–1.02)
DEPRECATED RDW RBC AUTO: 50.4 FL (ref 35.1–46.3)
EOSINOPHIL # BLD AUTO: 0.27 X10(3) UL (ref 0–0.7)
EOSINOPHIL NFR BLD AUTO: 4.7 %
ERYTHROCYTE [DISTWIDTH] IN BLOOD BY AUTOMATED COUNT: 13 % (ref 11–15)
GLUCOSE BLD-MCNC: 114 MG/DL (ref 70–99)
HCT VFR BLD AUTO: 27.6 % (ref 35–48)
HGB BLD-MCNC: 9.1 G/DL (ref 12–16)
IMM GRANULOCYTES # BLD AUTO: 0.16 X10(3) UL (ref 0–1)
IMM GRANULOCYTES NFR BLD: 2.8 %
LYMPHOCYTES # BLD AUTO: 1.07 X10(3) UL (ref 1–4)
LYMPHOCYTES NFR BLD AUTO: 18.5 %
MCH RBC QN AUTO: 35.3 PG (ref 26–34)
MCHC RBC AUTO-ENTMCNC: 33 G/DL (ref 31–37)
MCV RBC AUTO: 107 FL (ref 80–100)
MONOCYTES # BLD AUTO: 0.65 X10(3) UL (ref 0.1–1)
MONOCYTES NFR BLD AUTO: 11.2 %
NEUTROPHILS # BLD AUTO: 3.58 X10 (3) UL (ref 1.5–7.7)
NEUTROPHILS # BLD AUTO: 3.58 X10(3) UL (ref 1.5–7.7)
NEUTROPHILS NFR BLD AUTO: 61.9 %
OSMOLALITY SERPL CALC.SUM OF ELEC: 297 MOSM/KG (ref 275–295)
PLATELET # BLD AUTO: 253 10(3)UL (ref 150–450)
POTASSIUM SERPL-SCNC: 3.8 MMOL/L (ref 3.5–5.1)
RBC # BLD AUTO: 2.58 X10(6)UL (ref 3.8–5.3)
SODIUM SERPL-SCNC: 140 MMOL/L (ref 136–145)
WBC # BLD AUTO: 5.8 X10(3) UL (ref 4–11)

## 2020-03-16 PROCEDURE — 85025 COMPLETE CBC W/AUTO DIFF WBC: CPT | Performed by: INTERNAL MEDICINE

## 2020-03-16 PROCEDURE — 80048 BASIC METABOLIC PNL TOTAL CA: CPT | Performed by: INTERNAL MEDICINE

## 2020-03-17 ENCOUNTER — TELEPHONE (OUTPATIENT)
Dept: HEMATOLOGY/ONCOLOGY | Facility: HOSPITAL | Age: 80
End: 2020-03-17

## 2020-03-17 NOTE — TELEPHONE ENCOUNTER
Zita Corral called and was looking to speak with somebody about her imaging. She asked if she was supposed to be going to get her imaging done with everything that is going on. She asked that she get a call back to go over this.

## 2020-03-17 NOTE — TELEPHONE ENCOUNTER
Called Lizzie, reinforced need for MRI as soon as she can arrange safely with follow up with Dr Da Edward, no current GI issues, just back issues of which she is dealing with.

## 2020-03-23 PROBLEM — Z85.048 HISTORY OF RECTAL CANCER: Status: ACTIVE | Noted: 2020-03-23

## 2020-03-26 NOTE — DISCHARGE SUMMARY
[facility]    PATIENT'S NAME: Luis Enrique Woods KELLE   ATTENDING PHYSICIAN: Brett Evangelista MD   PATIENT ACCOUNT#:   [id number]    LOCATION:  [address] [place], [place] [address] [facility] #:   [id number]       YOB: 1940  ADMISSION DATE:       02/1 therapy. The patient underwent caudal epidural steroid injection under fluoroscopy by Dr. Angeline Cifuentes on 02/17/2020. The patient did well postprocedure with increased activity. Since she had improved, she was then discharged home.     CONDITION ON DISCHARGE:

## 2020-03-27 RX ORDER — GABAPENTIN 300 MG/1
CAPSULE ORAL
Qty: 90 CAPSULE | Refills: 0 | Status: SHIPPED | OUTPATIENT
Start: 2020-03-27 | End: 2020-08-06

## 2020-03-31 ENCOUNTER — TELEPHONE (OUTPATIENT)
Dept: HEMATOLOGY/ONCOLOGY | Facility: HOSPITAL | Age: 80
End: 2020-03-31

## 2020-03-31 NOTE — TELEPHONE ENCOUNTER
Son Margo Brownlee calling from [place] says her mom has a cough headache, no fever. Home health nurse was with his mom this morning. Margo Brownlee can conference call with mom.  Thanks [facility]

## 2020-03-31 NOTE — TELEPHONE ENCOUNTER
Returned call to Megantika Goodwin - son - instructed to contact PCP for evaluation of symptoms. He verbalizes understanding.

## 2020-04-01 ENCOUNTER — APPOINTMENT (OUTPATIENT)
Dept: HEMATOLOGY/ONCOLOGY | Facility: HOSPITAL | Age: 80
End: 2020-04-01
Attending: INTERNAL MEDICINE
Payer: MEDICARE

## 2020-04-08 ENCOUNTER — TELEPHONE (OUTPATIENT)
Dept: HEMATOLOGY/ONCOLOGY | Facility: HOSPITAL | Age: 80
End: 2020-04-08

## 2020-04-08 NOTE — TELEPHONE ENCOUNTER
Wilbert Goldstein, she states she is not feeling well, having nausea, vomiting, diarrhea, had fever and sore throat 2 weeks ago,(was not worked up for Virus) has a Home Health nurse coming in 15 minutes.   Instructed to have nurse evaluate her and call office

## 2020-04-08 NOTE — TELEPHONE ENCOUNTER
Marcos called and said that his mother has no energy and has been extremely tired. She is dehydrated he asked that we reach out to her at [phone].  He stated that she has came in before for transfusions and wants to make sure that there is nothing that n

## 2020-04-12 ENCOUNTER — HOSPITAL ENCOUNTER (INPATIENT)
Facility: HOSPITAL | Age: 80
LOS: 13 days | Discharge: NURSING, REHAB OR LONG-TERM CARE | DRG: 177 | End: 2020-04-25
Attending: EMERGENCY MEDICINE | Admitting: HOSPITALIST
Payer: MEDICARE

## 2020-04-12 ENCOUNTER — APPOINTMENT (OUTPATIENT)
Dept: ULTRASOUND IMAGING | Facility: HOSPITAL | Age: 80
DRG: 177 | End: 2020-04-12
Attending: INTERNAL MEDICINE
Payer: MEDICARE

## 2020-04-12 ENCOUNTER — APPOINTMENT (OUTPATIENT)
Dept: GENERAL RADIOLOGY | Facility: HOSPITAL | Age: 80
DRG: 177 | End: 2020-04-12
Attending: EMERGENCY MEDICINE
Payer: MEDICARE

## 2020-04-12 DIAGNOSIS — U07.1 COVID-19 VIRUS INFECTION: Primary | ICD-10-CM

## 2020-04-12 DIAGNOSIS — E83.42 HYPOMAGNESEMIA: ICD-10-CM

## 2020-04-12 DIAGNOSIS — N28.9 ACUTE RENAL INSUFFICIENCY: ICD-10-CM

## 2020-04-12 DIAGNOSIS — E87.6 HYPOKALEMIA: ICD-10-CM

## 2020-04-12 DIAGNOSIS — R19.7 DIARRHEA, UNSPECIFIED TYPE: ICD-10-CM

## 2020-04-12 PROCEDURE — 71045 X-RAY EXAM CHEST 1 VIEW: CPT | Performed by: EMERGENCY MEDICINE

## 2020-04-12 PROCEDURE — 76770 US EXAM ABDO BACK WALL COMP: CPT | Performed by: INTERNAL MEDICINE

## 2020-04-12 PROCEDURE — 99223 1ST HOSP IP/OBS HIGH 75: CPT | Performed by: HOSPITALIST

## 2020-04-12 RX ORDER — HYDROXYCHLOROQUINE SULFATE 200 MG/1
400 TABLET, FILM COATED ORAL 2 TIMES DAILY
Status: COMPLETED | OUTPATIENT
Start: 2020-04-12 | End: 2020-04-12

## 2020-04-12 RX ORDER — SODIUM CHLORIDE AND POTASSIUM CHLORIDE .9; .15 G/100ML; G/100ML
SOLUTION INTRAVENOUS CONTINUOUS
Status: DISCONTINUED | OUTPATIENT
Start: 2020-04-12 | End: 2020-04-14

## 2020-04-12 RX ORDER — SODIUM CHLORIDE 0.9 % (FLUSH) 0.9 %
3 SYRINGE (ML) INJECTION AS NEEDED
Status: DISCONTINUED | OUTPATIENT
Start: 2020-04-12 | End: 2020-04-25

## 2020-04-12 RX ORDER — ASPIRIN 81 MG/1
81 TABLET ORAL NIGHTLY
Status: DISCONTINUED | OUTPATIENT
Start: 2020-04-12 | End: 2020-04-25

## 2020-04-12 RX ORDER — SERTRALINE HYDROCHLORIDE 100 MG/1
100 TABLET, FILM COATED ORAL DAILY
Status: DISCONTINUED | OUTPATIENT
Start: 2020-04-12 | End: 2020-04-12

## 2020-04-12 RX ORDER — ALPRAZOLAM 0.25 MG/1
0.25 TABLET ORAL 2 TIMES DAILY PRN
Status: DISCONTINUED | OUTPATIENT
Start: 2020-04-12 | End: 2020-04-25

## 2020-04-12 RX ORDER — LEVOTHYROXINE SODIUM 137 UG/1
137 TABLET ORAL
Status: DISCONTINUED | OUTPATIENT
Start: 2020-04-12 | End: 2020-04-25

## 2020-04-12 RX ORDER — POLYETHYLENE GLYCOL 3350 17 G/17G
17 POWDER, FOR SOLUTION ORAL DAILY
Status: DISCONTINUED | OUTPATIENT
Start: 2020-04-12 | End: 2020-04-17

## 2020-04-12 RX ORDER — ATENOLOL 25 MG/1
25 TABLET ORAL DAILY
Status: DISCONTINUED | OUTPATIENT
Start: 2020-04-12 | End: 2020-04-24

## 2020-04-12 RX ORDER — ASPIRIN 81 MG/1
81 TABLET ORAL DAILY
Status: DISCONTINUED | OUTPATIENT
Start: 2020-04-12 | End: 2020-04-12

## 2020-04-12 RX ORDER — ONDANSETRON 2 MG/ML
4 INJECTION INTRAMUSCULAR; INTRAVENOUS ONCE
Status: COMPLETED | OUTPATIENT
Start: 2020-04-12 | End: 2020-04-12

## 2020-04-12 RX ORDER — HYDROXYCHLOROQUINE SULFATE 200 MG/1
200 TABLET, FILM COATED ORAL 2 TIMES DAILY
Status: COMPLETED | OUTPATIENT
Start: 2020-04-13 | End: 2020-04-16

## 2020-04-12 RX ORDER — HYDROCODONE BITARTRATE AND ACETAMINOPHEN 5; 325 MG/1; MG/1
1 TABLET ORAL EVERY 4 HOURS PRN
Status: DISCONTINUED | OUTPATIENT
Start: 2020-04-12 | End: 2020-04-16

## 2020-04-12 RX ORDER — DOCUSATE SODIUM 100 MG/1
100 CAPSULE, LIQUID FILLED ORAL 2 TIMES DAILY
Status: DISCONTINUED | OUTPATIENT
Start: 2020-04-12 | End: 2020-04-12

## 2020-04-12 RX ORDER — HEPARIN SODIUM 5000 [USP'U]/ML
5000 INJECTION, SOLUTION INTRAVENOUS; SUBCUTANEOUS EVERY 12 HOURS SCHEDULED
Status: DISCONTINUED | OUTPATIENT
Start: 2020-04-12 | End: 2020-04-25

## 2020-04-12 RX ORDER — ONDANSETRON 2 MG/ML
4 INJECTION INTRAMUSCULAR; INTRAVENOUS EVERY 6 HOURS PRN
Status: DISCONTINUED | OUTPATIENT
Start: 2020-04-12 | End: 2020-04-17

## 2020-04-12 RX ORDER — DOCUSATE SODIUM 100 MG/1
100 CAPSULE, LIQUID FILLED ORAL 2 TIMES DAILY
Status: DISCONTINUED | OUTPATIENT
Start: 2020-04-12 | End: 2020-04-17

## 2020-04-12 RX ORDER — METOCLOPRAMIDE HYDROCHLORIDE 5 MG/ML
5 INJECTION INTRAMUSCULAR; INTRAVENOUS EVERY 8 HOURS PRN
Status: DISCONTINUED | OUTPATIENT
Start: 2020-04-12 | End: 2020-04-25

## 2020-04-12 RX ORDER — DICYCLOMINE HYDROCHLORIDE 10 MG/1
10 CAPSULE ORAL 4 TIMES DAILY
Status: DISCONTINUED | OUTPATIENT
Start: 2020-04-12 | End: 2020-04-25

## 2020-04-12 RX ORDER — HYDROCODONE BITARTRATE AND ACETAMINOPHEN 5; 325 MG/1; MG/1
1 TABLET ORAL EVERY 8 HOURS PRN
Status: DISCONTINUED | OUTPATIENT
Start: 2020-04-12 | End: 2020-04-12 | Stop reason: ALTCHOICE

## 2020-04-12 RX ORDER — ACETAMINOPHEN 325 MG/1
650 TABLET ORAL EVERY 4 HOURS PRN
Status: DISCONTINUED | OUTPATIENT
Start: 2020-04-12 | End: 2020-04-16

## 2020-04-12 RX ORDER — ASCORBIC ACID 500 MG
500 TABLET ORAL 2 TIMES DAILY
Status: DISCONTINUED | OUTPATIENT
Start: 2020-04-12 | End: 2020-04-25

## 2020-04-12 RX ORDER — POLYETHYLENE GLYCOL 3350 17 G/17G
17 POWDER, FOR SOLUTION ORAL DAILY PRN
Status: DISCONTINUED | OUTPATIENT
Start: 2020-04-12 | End: 2020-04-25

## 2020-04-12 RX ORDER — GABAPENTIN 100 MG/1
100 CAPSULE ORAL 3 TIMES DAILY
Status: DISCONTINUED | OUTPATIENT
Start: 2020-04-12 | End: 2020-04-12

## 2020-04-12 RX ORDER — AZITHROMYCIN 250 MG/1
500 TABLET, FILM COATED ORAL DAILY
Status: COMPLETED | OUTPATIENT
Start: 2020-04-12 | End: 2020-04-14

## 2020-04-12 RX ORDER — HYDROCODONE BITARTRATE AND ACETAMINOPHEN 5; 325 MG/1; MG/1
2 TABLET ORAL EVERY 4 HOURS PRN
Status: DISCONTINUED | OUTPATIENT
Start: 2020-04-12 | End: 2020-04-16

## 2020-04-12 RX ORDER — ZINC SULFATE 50(220)MG
220 CAPSULE ORAL DAILY
Status: DISCONTINUED | OUTPATIENT
Start: 2020-04-12 | End: 2020-04-25

## 2020-04-12 RX ORDER — FAMOTIDINE 20 MG/1
20 TABLET ORAL NIGHTLY
Status: DISCONTINUED | OUTPATIENT
Start: 2020-04-12 | End: 2020-04-25

## 2020-04-12 RX ORDER — SERTRALINE HYDROCHLORIDE 100 MG/1
100 TABLET, FILM COATED ORAL NIGHTLY
Status: DISCONTINUED | OUTPATIENT
Start: 2020-04-12 | End: 2020-04-25

## 2020-04-12 RX ORDER — GABAPENTIN 300 MG/1
300 CAPSULE ORAL 3 TIMES DAILY
Status: DISCONTINUED | OUTPATIENT
Start: 2020-04-12 | End: 2020-04-25

## 2020-04-12 RX ORDER — MAGNESIUM SULFATE HEPTAHYDRATE 40 MG/ML
2 INJECTION, SOLUTION INTRAVENOUS ONCE
Status: COMPLETED | OUTPATIENT
Start: 2020-04-12 | End: 2020-04-12

## 2020-04-12 NOTE — PROGRESS NOTES
Have been in contact with patient and sons during the week. Discussed her again this AM.  She continues to have loose stools which occur 2-3 times per day and are not bloody. This has been a chronic issue for her since her surgery for rectal cancer.   She

## 2020-04-12 NOTE — ED NOTES
Pt states she started having a sore throat and fever two weeks ago which has progressed into dry-heaving and diarrhea. Pt states she is keeping down fluids at home. Pt states she lives alone but has her son bringing her groceries.  Pt with one episode of di

## 2020-04-12 NOTE — H&P
[address] Patient Status:  Inpatient    1940 MRN [id number]   Location [facility] 4W/SW/SE Attending [address] Day # 0 PCP Alexandra Navarro MD     Date:   2-4 times a month      Drinks per session: 1 or 2      Binge frequency: Never    Drug use: Never    Allergies/Medications:    Allergies:   Compazine [Prochlor*    OTHER (SEE COMMENTS)    Comment:Pt does not recall the reaction  Sulfa Antibiotics       RASH by mouth every 8 (eight) hours as needed for Nausea. atenolol 50 MG Oral Tab, Take 50 mg by mouth daily. Levothyroxine Sodium 137 MCG Oral Tab, Take 137 mcg by mouth before breakfast.    simvastatin (ZOCOR) 20 MG Oral Tab, Take 20 mg by mouth daily. 04/12/2020    B12 408 12/17/2019       Xr Chest Ap Portable  (cpt=71045)    Result Date: 4/12/2020  CONCLUSION: No evidence acute cardiopulmonary disease.     Dictated by (CST): Dilma Rodriguez MD on 4/12/2020 at 11:37 AM     Finalized by (CST): Xiomy Colmenares,

## 2020-04-12 NOTE — CONSULTS
[facility]    Report of Consultation    Ana Maria Lord Patient Status:  Emergency    1940 MRN [id number]   Location [address] Attending [address] Day # 0 PCP Raymundo Woodall MD hernia    • HYSTERECTOMY     • KNEE REPLACEMENT SURGERY Right        Family History  Family History   Problem Relation Age of Onset   • Cancer Sister        Social History  Social History    Tobacco Use      Smoking status: Former Smoker      Smokeless tob 105   CO2 22.0        Imaging:  Xr Chest Ap Portable  (cpt=71045)    Result Date: 4/12/2020  CONCLUSION: No evidence acute cardiopulmonary disease.     Dictated by (CST): Roberto Christopher MD on 4/12/2020 at 11:37 AM     Finalized by (CST): Roberto Christopher

## 2020-04-12 NOTE — PROGRESS NOTES
[facility] Pharmacy Note:  Renal Dose Adjustment    Carrie Holden has been prescribed famotidine (PEPCID) 40 mg orally every 24 hours. Estimated Creatinine Clearance: 17.8 mL/min (A) (based on SCr of 2.03 mg/dL (H)).     Her calculated creatinine clearance

## 2020-04-12 NOTE — ED PROVIDER NOTES
Patient Seen in: [facility] Emergency Department      History   Patient presents with:  Nausea/Vomiting/Diarrhea    Stated Complaint: fever, vomiting, diarrhea    HPI    27-year-old female presents via EMS for fever.   Patient states symptoms have Systems   Constitutional: Negative. HENT: Negative. Eyes: Negative. Respiratory: Negative. Cardiovascular: Negative. Gastrointestinal: Negative. Genitourinary: Negative. Musculoskeletal: Negative. Skin: Negative.     Neurological: Ne Psychiatric:         Attention and Perception: Attention normal.         Mood and Affect: Mood normal.         Speech: Speech normal.         Behavior: Behavior normal.               ED Course     Labs Reviewed   BASIC METABOLIC PANEL (8) - Abnormal; Not I   D-DIMER   RAINBOW DRAW BLUE   RAINBOW DRAW LAVENDER   RAINBOW DRAW LIGHT GREEN   RAINBOW DRAW GOLD   C. DIFFICILE(TOXIGENIC)PCR     EKG    Rate, intervals and axes as noted on EKG Report.   Rate: 75  Rhythm: Sinus Rhythm  Reading: no stemi, interpreted speaking with consultants, admitting doctors, nurses and medics and excludes any time spent on procedures.           Admission disposition: 4/12/2020 12:16 PM                   Disposition and Plan     Clinical Impression:  COVID-19 virus infection  (primar

## 2020-04-13 PROCEDURE — 99233 SBSQ HOSP IP/OBS HIGH 50: CPT | Performed by: HOSPITALIST

## 2020-04-13 RX ORDER — POTASSIUM CHLORIDE 1.5 G/1.77G
40 POWDER, FOR SOLUTION ORAL ONCE
Status: COMPLETED | OUTPATIENT
Start: 2020-04-13 | End: 2020-04-13

## 2020-04-13 RX ORDER — LOPERAMIDE HYDROCHLORIDE 2 MG/1
2 CAPSULE ORAL 4 TIMES DAILY PRN
Status: DISCONTINUED | OUTPATIENT
Start: 2020-04-13 | End: 2020-04-25

## 2020-04-13 RX ORDER — POTASSIUM CHLORIDE 20 MEQ/1
40 TABLET, EXTENDED RELEASE ORAL ONCE
Status: COMPLETED | OUTPATIENT
Start: 2020-04-13 | End: 2020-04-13

## 2020-04-13 NOTE — PROGRESS NOTES
[facility]    Progress Note    Lee Prose Patient Status:  Inpatient    1940 MRN [id number]   Location [facility] 4W/SW/SE Attending Vinny Echols MD   Hosp Day # 1 PCP Sherry Michaels MD       Subjective:   Lavon Salas 04/13/2020    GLU 82 04/13/2020    CA 8.6 04/13/2020    ALB 2.4 (L) 04/13/2020    ALKPHO 83 04/13/2020    BILT 0.2 04/13/2020    TP 6.5 04/13/2020    AST 21 04/13/2020    ALT 34 04/13/2020    PTT 26.9 04/12/2020    INR 1.26 (H) 04/12/2020    T4F 1.2 04/13/    Acute renal failure  -likely related to dehydration  -cautiously hydrate with ivf  -NS+ 20meq K+ at 75 cc/hr--> 50 cc/hr  -vazquez nephrology eval     FEN  -replete K an Mg     HTN  -home meds losartan, atenolol  -hold losartan  -cont atenolol 25mg  (home

## 2020-04-13 NOTE — PROGRESS NOTES
S: Patient is recovering from cancer. She has two sons and four grandchildren. Patient is a member of a [employer].   O: Patient expressed frustration regarding her current condition and confusion as to how she contracted the virus

## 2020-04-13 NOTE — PLAN OF CARE
Kat Pool remains on room air this evening. Continues to c/o intermittent nausea, zofran and reglan given prn. Heparin for dvt prophylaxis. NSR on remote tele. Patient has had multiple soft/liquid stools this evening. Berkowitz cath in place.  Tylenol given for hea physical needs  - Identify cognitive and physical deficits and behaviors that affect risk of falls.   - [place] fall precautions as indicated by assessment.  - Educate pt/family on patient safety including physical limitations  - Instruct pt to call for a Assess and instruct to report SOB or any respiratory difficulty  - Respiratory Therapy support as indicated  - Manage/alleviate anxiety  - Monitor for signs/symptoms of CO2 retention  Outcome: Progressing     Problem: SKIN/TISSUE INTEGRITY - ADULT  Goal: S

## 2020-04-13 NOTE — PROGRESS NOTES
[facility]    Progress Note    Reza Cruz Patient Status:  Inpatient    1940 MRN [id number]   Location [facility] 4W/SW/SE Attending Emely Rahman MD   Hosp Day # 1 PCP Alexandra Navarro MD       Subjective:   Brittany Part 177.0         Recent Labs   Lab 04/12/20  1112 04/13/20  0520   * 82   BUN 45* 34*   CREATSERUM 2.03* 1.52*   GFRAA 26* 37*   GFRNAA 23* 32*   CA 8.6 8.6    142   K 2.3* 2.5*    115*   CO2 22.0 16.0*          Xr Chest Ap Portable  (cpt=7

## 2020-04-13 NOTE — PLAN OF CARE
Patient up with contact guard assist, complaints of weakness and unsteady gait. Having frequent diarrhea. Imodium prn. K replaced per MD orders orally today. Patient remains stable on room air. Complaints of nausea. Reglan and zofran prn. No emisis.   Woo anxiety  - Utilize distraction and/or relaxation techniques  - Monitor for opioid side effects  - Notify MD/LIP if interventions unsuccessful or patient reports new pain  - Anticipate increased pain with activity and pre-medicate as appropriate  Outcome: P status  - Assess for changes in mentation and behavior  - Position to facilitate oxygenation and minimize respiratory effort  - Oxygen supplementation based on oxygen saturation or ABGs  - Provide Smoking Cessation handout, if applicable  - Encourage bron

## 2020-04-13 NOTE — CM/SW NOTE
Patient comes from [address] and has been tested for COVID-19. This patient's COVID-19 test is positive. At the time of this note, there are  known positive cases from that facility.     / to [place]

## 2020-04-14 PROCEDURE — 99233 SBSQ HOSP IP/OBS HIGH 50: CPT | Performed by: HOSPITALIST

## 2020-04-14 RX ORDER — MAGNESIUM OXIDE 400 MG (241.3 MG MAGNESIUM) TABLET
400 TABLET ONCE
Status: COMPLETED | OUTPATIENT
Start: 2020-04-14 | End: 2020-04-14

## 2020-04-14 RX ORDER — POTASSIUM CHLORIDE 20 MEQ/1
40 TABLET, EXTENDED RELEASE ORAL EVERY 4 HOURS
Status: COMPLETED | OUTPATIENT
Start: 2020-04-14 | End: 2020-04-14

## 2020-04-14 RX ORDER — SODIUM CHLORIDE 9 MG/ML
INJECTION, SOLUTION INTRAVENOUS CONTINUOUS
Status: DISCONTINUED | OUTPATIENT
Start: 2020-04-14 | End: 2020-04-15

## 2020-04-14 NOTE — DIETARY NOTE
ADULT NUTRITION INITIAL ASSESSMENT    Pt is at moderate nutrition risk.   Pt does not meet malnutrition criteria although unable to conduct face to face interview or perform nutrition focused physical exam with pt d/t limited contact restrictions due to t past medical history of Anxiety state, unspecified, Cataract, Depression, High blood pressure, MGUS (monoclonal gammopathy of unknown significance), Ovarian ca ([facility] [address] Pernicious anemia, Rectal cancer ([facility] [address] Transfusion history, Type II or unspecified type melatonin  5 mg Oral Nightly   • Vitamin C  500 mg Oral BID   • zinc sulfate  220 mg Oral Daily   • gabapentin  300 mg Oral TID   • aspirin  81 mg Oral Nightly   • Sertraline HCl  100 mg Oral Nightly       LABS: reviewed.  Labs consistent with medical statu

## 2020-04-14 NOTE — PLAN OF CARE
Patient up with contact guard assist and the walker, complaints of weakness and unsteady gait. Having frequent diarrhea. Imodium prn. K replaced per protocol orally today. Patient remains stable on room air. Nausea improving, prn zofran.  Berkowitz to remain in Utilize distraction and/or relaxation techniques  - Monitor for opioid side effects  - Notify MD/LIP if interventions unsuccessful or patient reports new pain  - Anticipate increased pain with activity and pre-medicate as appropriate  Outcome: Progressing for changes in mentation and behavior  - Position to facilitate oxygenation and minimize respiratory effort  - Oxygen supplementation based on oxygen saturation or ABGs  - Provide Smoking Cessation handout, if applicable  - Encourage broncho-pulmonary hygi

## 2020-04-14 NOTE — PROGRESS NOTES
[facility]    Progress Note    Charanjit Thornton Patient Status:  Inpatient    1940 MRN [id number]   Location [facility] 4W/SW/SE Attending Bonnei Lopez MD   Hosp Day # 2 PCP Annika Vega MD       Subjective:   Aziza Urias 04/14/2020    GLU 89 04/14/2020    CA 8.5 04/14/2020    ALB 2.4 (L) 04/13/2020    ALKPHO 83 04/13/2020    BILT 0.2 04/13/2020    TP 6.5 04/13/2020    AST 21 04/13/2020    ALT 34 04/13/2020    PTT 26.9 04/12/2020    INR 1.26 (H) 04/12/2020    T4F 1.2 04/13/

## 2020-04-14 NOTE — PROGRESS NOTES
[facility]    Progress Note    Reza Cruz Patient Status:  Inpatient    1940 MRN [id number]   Location [facility] 4W/SW/SE Attending Emely Rahman MD   Hosp Day # 2 PCP Alexandra Navarro MD       Subjective:   Brittany Part 5. 13 3.47 4.09   WBC 6.6 4.8 5.2   .0 177.0 165.0         Recent Labs   Lab 04/12/20  1112 04/13/20  0520 04/14/20  0521   * 82 89   BUN 45* 34* 24*   CREATSERUM 2.03* 1.52* 1.19*   GFRAA 26* 37* 50*   GFRNAA 23* 32* 44*   CA 8.6 8.6 8.5   NA

## 2020-04-15 PROCEDURE — 99233 SBSQ HOSP IP/OBS HIGH 50: CPT | Performed by: HOSPITALIST

## 2020-04-15 RX ORDER — POTASSIUM CHLORIDE 20 MEQ/1
40 TABLET, EXTENDED RELEASE ORAL ONCE
Status: COMPLETED | OUTPATIENT
Start: 2020-04-15 | End: 2020-04-15

## 2020-04-15 NOTE — CM/SW NOTE
SHAHRAM received MD order for dc planning for SNF placement. SHAHRAM spoke with RN Joshua Marte, per Farzana Lester pt finally fell asleep and ok to call sons. SHAHRAM spoke with Lisset Ledesma via telephone call on three way.   Per son's pt recently dc'd from [facility] after a b

## 2020-04-15 NOTE — PROGRESS NOTES
[facility]    Progress Note    Yan Aguirre Patient Status:  Inpatient    1940 MRN [id number]   Location [facility] 4W/SW/SE Attending Antonia Lemos MD   Hosp Day # 3 PCP Sarina Vaca MD       Subjective:   Diana Waddell (H) 04/12/2020    T4F 1.2 04/13/2020    TSH 5.080 (H) 04/13/2020    DDIMER 1.24 (H) 04/15/2020    ESRML 81 (H) 10/05/2018    CRP 3.94 (H) 04/15/2020    MG 1.4 (L) 04/15/2020    PHOS 2.6 04/15/2020    TROP <0.045 04/12/2020    CK 50 04/12/2020    B12 408 12

## 2020-04-15 NOTE — PLAN OF CARE
Pt alert and oriented, denies SOB but has BOLTON. RN started O2 at 2L via NC for low dipping sats into 80s.  Walking with walker, c/o pain in back and HA, treated with PRN tylenol and norco5, chronic watery stool d/t SBS treated with PRN imodium Q6 and zofran goal  Description  INTERVENTIONS:  - Encourage pt to monitor pain and request assistance  - Assess pain using appropriate pain scale  - Administer analgesics based on type and severity of pain and evaluate response  - Implement non-pharmacological measures Department for coordinating discharge planning if the patient needs post-hospital services based on physician/LIP order or complex needs related to functional status, cognitive ability or social support system  Outcome: Progressing     Problem: RESPIRATORY

## 2020-04-16 ENCOUNTER — TELEPHONE (OUTPATIENT)
Dept: INTERNAL MEDICINE UNIT | Facility: HOSPITAL | Age: 80
End: 2020-04-16

## 2020-04-16 PROCEDURE — 99233 SBSQ HOSP IP/OBS HIGH 50: CPT | Performed by: HOSPITALIST

## 2020-04-16 RX ORDER — HYDROXYCHLOROQUINE SULFATE 200 MG/1
200 TABLET, FILM COATED ORAL 2 TIMES DAILY
Qty: 2 TABLET | Refills: 0 | Status: SHIPPED | OUTPATIENT
Start: 2020-04-16 | End: 2020-04-17

## 2020-04-16 RX ORDER — HYDROCODONE BITARTRATE AND ACETAMINOPHEN 5; 325 MG/1; MG/1
1 TABLET ORAL EVERY 8 HOURS PRN
Qty: 5 TABLET | Refills: 0 | Status: SHIPPED | OUTPATIENT
Start: 2020-04-16 | End: 2020-04-17

## 2020-04-16 RX ORDER — ASCORBIC ACID 500 MG
500 TABLET ORAL 2 TIMES DAILY
Qty: 60 TABLET | Refills: 0 | Status: ON HOLD | OUTPATIENT
Start: 2020-04-16 | End: 2021-04-05

## 2020-04-16 RX ORDER — ALPRAZOLAM 0.25 MG/1
TABLET ORAL
Qty: 5 TABLET | Refills: 0 | Status: SHIPPED | OUTPATIENT
Start: 2020-04-16

## 2020-04-16 RX ORDER — ZINC SULFATE 50(220)MG
220 CAPSULE ORAL DAILY
Qty: 30 CAPSULE | Refills: 0 | Status: ON HOLD | OUTPATIENT
Start: 2020-04-16 | End: 2021-04-05

## 2020-04-16 RX ORDER — MAGNESIUM OXIDE 400 MG (241.3 MG MAGNESIUM) TABLET
5 TABLET NIGHTLY
Qty: 30 TABLET | Refills: 0 | Status: SHIPPED | OUTPATIENT
Start: 2020-04-16 | End: 2020-08-06

## 2020-04-16 RX ORDER — TRAMADOL HYDROCHLORIDE 50 MG/1
100 TABLET ORAL EVERY 6 HOURS PRN
Status: DISCONTINUED | OUTPATIENT
Start: 2020-04-16 | End: 2020-04-25

## 2020-04-16 NOTE — CM/SW NOTE
CM made aware by Dr. Inocencia Cornejo that pt is ready for dc tomorrow. [facility] is unable to take COVID (+) pt's into SNF at this time. Pt can dc back to [facility] but family will need to provide 24hr care.    [facility] is willing to accept pt under the following co

## 2020-04-16 NOTE — DISCHARGE SUMMARY
[facility]    Discharge Summary    Shante Pruitt Patient Status:  Inpatient    1940 MRN [id number]   Location [facility] 4W/SW/SE Attending Charly Hess MD   Hosp Day # 4 PCP Lina Prasad MD     Date of Admission deficits  MSK: Full range of motion in extremities  Skin: Warm and dry  Psych: Normal affect  Ext: no c/c/e      History of Present Illness: Per Dr Jyothi Carranza is a(n) 78year old female.  She has a pmh htn, hl, MGUS, rectal cancer, endometr these medications      Instructions Prescription details   acetaminophen 325 MG Tabs  Commonly known as:  TYLENOL      Take 325 mg by mouth every 6 (six) hours as needed for Pain.    Refills:  0     ALPRAZolam 0.25 MG Tabs  Commonly known as:  Buddy LEAL Levothyroxine Sodium 137 MCG Tabs      Take 137 mcg by mouth before breakfast.   Refills:  0     Loperamide HCl 2 MG Caps  Commonly known as:  IMODIUM      Take 1 capsule (2 mg total) by mouth 4 (four) times daily as needed for Diarrhea.    Quantity:  120

## 2020-04-16 NOTE — PLAN OF CARE
Patient A/Ox4, VSS. On room air. Voiding freely, saline locked per protocol. Patient having frequent episodes of diarrhea. States this is her baseline due to hx of bowel resection. Imodium given PRN. Nausea treated with Zofran PRN.  Mepilex in place on bila Consider cultural and social influences on pain and pain management  - Manage/alleviate anxiety  - Utilize distraction and/or relaxation techniques  - Monitor for opioid side effects  - Notify MD/LIP if interventions unsuccessful or patient reports new heather and oxygenation  Description  INTERVENTIONS:  - Assess for changes in respiratory status  - Assess for changes in mentation and behavior  - Position to facilitate oxygenation and minimize respiratory effort  - Oxygen supplementation based on oxygen saturat

## 2020-04-16 NOTE — TELEPHONE ENCOUNTER
Call from [facility] pharmacist alerting of potential interaction between hydroxychloroquine and ondansetron : potnetial qtc prolongation    Pharmacist also requesting Dx for hydroxychloroquine.      Discussed w Dr Tala Cevallos, Dx Covid 23, and approved to fill medicat

## 2020-04-16 NOTE — OCCUPATIONAL THERAPY NOTE
OCCUPATIONAL THERAPY EVALUATION - INPATIENT     Room Number: 421/421-A  Evaluation Date: 4/16/2020  Type of Evaluation: Initial  Presenting Problem: poor appetite; COVID19    Physician Order: IP Consult to Occupational Therapy  Reason for Therapy: ADL/IADL self care, pt moaning and refusing due to present nausea and intermittent gagging. Pt does not feel safe returning to her ILF due to her current level of performance and fatigue.      The patient's Approx Degree of Impairment: 50.11% has been calculated bas Apartment([facility])  Home Layout: One level  Lives With: Alone        Shower/Tub and Equipment: Tub-shower combo; Shower chair             Drives: Yes  Patient Regularly Uses: None      Prior Level of [place]: Prior to stay in [place] path    BALANCE ASSESSMENT  Static Sitting: good  Dynamic Sitting: fair+  Static Standing: fair  Dynamic Standing: fair    FUNCTIONAL ADL ASSESSMENT  Grooming: CGA for standing balance at the sink ; set-up for tooth-brushing  Feeding: set-up   Bathing: NT

## 2020-04-16 NOTE — PHYSICAL THERAPY NOTE
PHYSICAL THERAPY EVALUATION - INPATIENT     Room Number: 421/421-A  Evaluation Date: 4/16/2020  Type of Evaluation: Initial   Physician Order: PT Eval and Treat    Presenting Problem: +COVID-19  Reason for Therapy: Mobility Dysfunction and Discharge Plann and someone to \"sterilize\" her apartment (per , pt's sons seeking increased assist). The patient's Approx Degree of Impairment: 50.57% has been calculated based on documentation in the [facility] '6 clicks' Inpatient Basic Mobility Short Form.   R SIGMOIDOSCOPY N/A 8/14/2019    Performed by Bernabe Quinn MD at [facility] ENDOSCOPY   • HERNIA SURGERY  6/16/15     ventral hernia    • HYSTERECTOMY     • KNEE REPLACEMENT SURGERY Right        HOME SITUATION  Type of Home: Apartment([facility] etc.): A Little   -   Moving from lying on back to sitting on the side of the bed?: A Little   How much help from another person does the patient currently need. ..   -   Moving to and from a bed to a chair (including a wheelchair)?: A Little   -   Need to

## 2020-04-17 PROCEDURE — 99233 SBSQ HOSP IP/OBS HIGH 50: CPT | Performed by: HOSPITALIST

## 2020-04-17 RX ORDER — ONDANSETRON 4 MG/1
4 TABLET, ORALLY DISINTEGRATING ORAL 3 TIMES DAILY
Status: DISCONTINUED | OUTPATIENT
Start: 2020-04-17 | End: 2020-04-25

## 2020-04-17 RX ORDER — POTASSIUM CHLORIDE 20 MEQ/1
40 TABLET, EXTENDED RELEASE ORAL EVERY 4 HOURS
Status: COMPLETED | OUTPATIENT
Start: 2020-04-17 | End: 2020-04-17

## 2020-04-17 RX ORDER — TRAMADOL HYDROCHLORIDE 50 MG/1
100 TABLET ORAL EVERY 6 HOURS PRN
Qty: 5 TABLET | Refills: 0 | Status: SHIPPED | OUTPATIENT
Start: 2020-04-17 | End: 2020-04-25

## 2020-04-17 NOTE — PLAN OF CARE
Patient A/Ox4, VSS. Afebrile. On room air. Tolerating oral intake. States nausea is present but denies any vomiting. Nausea treated with IV Zofran PRN. Dr. Nia Sandoval contacted r/t patient request to be seen today by gastroenterologist Dr. Laureen Pollard due to nausea. evaluate response  - Implement non-pharmacological measures as appropriate and evaluate response  - Consider cultural and social influences on pain and pain management  - Manage/alleviate anxiety  - Utilize distraction and/or relaxation techniques  - Monit support system  Outcome: Progressing     Problem: RESPIRATORY - ADULT  Goal: Achieves optimal ventilation and oxygenation  Description  INTERVENTIONS:  - Assess for changes in respiratory status  - Assess for changes in mentation and behavior  - Position t

## 2020-04-17 NOTE — PROGRESS NOTES
[facility]    Progress Note    Kulwinder Quezada Patient Status:  Inpatient    1940 MRN [id number]   Location [facility] 4W/SW/SE Attending Анна Francisco MD   Hosp Day # 5 PCP Trent Houser MD       Subjective:   Caroline Flaherty (H) 04/12/2020    T4F 1.2 04/13/2020    TSH 5.080 (H) 04/13/2020    DDIMER 1.24 (H) 04/15/2020    ESRML 81 (H) 10/05/2018    CRP 2.21 (H) 04/16/2020    MG 1.4 (L) 04/15/2020    PHOS 2.6 04/15/2020    TROP <0.045 04/12/2020    CK 50 04/12/2020    B12 408 12

## 2020-04-17 NOTE — PLAN OF CARE
Problem: Patient Centered Care  Goal: Patient preferences are identified and integrated in the patient's plan of care  Description  Interventions:  - What would you like us to know as we care for you?  \"I didn't leave my house before this, I don't know h needs  - Identify cognitive and physical deficits and behaviors that affect risk of falls.   - [place] fall precautions as indicated by assessment.  - Educate pt/family on patient safety including physical limitations  - Instruct pt to call for assistance perform as needed  - Assess and instruct to report SOB or any respiratory difficulty  - Respiratory Therapy support as indicated  - Manage/alleviate anxiety  - Monitor for signs/symptoms of CO2 retention  Outcome: Progressing     Problem: SKIN/TISSUE INTEG

## 2020-04-18 PROCEDURE — 99233 SBSQ HOSP IP/OBS HIGH 50: CPT | Performed by: HOSPITALIST

## 2020-04-18 NOTE — PLAN OF CARE
Patient's nausea is better controlled with ATC Zofran. 100.1 fever this evening. Reglan given once for nausea. She was up to chair for dinner. Feeling very fatigued this evening.      Problem: Patient Centered Care  Goal: Patient preferences are identified pre-medicate as appropriate  Outcome: Progressing     Problem: SAFETY ADULT - FALL  Goal: Free from fall injury  Description  INTERVENTIONS:  - Assess pt frequently for physical needs  - Identify cognitive and physical deficits and behaviors that affect ri if applicable  - Encourage broncho-pulmonary hygiene including cough, deep breathe, Incentive Spirometry  - Assess the need for suctioning and perform as needed  - Assess and instruct to report SOB or any respiratory difficulty  - Respiratory Therapy suppo

## 2020-04-18 NOTE — PROGRESS NOTES
[facility]    Progress Note    Marilynn Tinajero Patient Status:  Inpatient    1940 MRN [id number]   Location [facility] 4W/SW/SE Attending Lucretia Wynn MD   Hosp Day # 6 PCP Michaela Rogel MD       Subjective:   Kareem Head 04/15/2020    ESRML 81 (H) 10/05/2018    CRP 2.21 (H) 04/16/2020    MG 1.4 (L) 04/15/2020    PHOS 2.6 04/15/2020    TROP <0.045 04/12/2020    CK 50 04/12/2020    B12 408 12/17/2019                   Assessment and Plan:      COVID-19 virus infection  -with

## 2020-04-18 NOTE — PLAN OF CARE
Landen Caceres is tolerating room air this evening. Low grade temp that resolved on its own. Tramadol given for headache. Patient c/o tiredness/weakness but up with a steady gait. Immodium given prn for diarrhea. Droplet and contact isolation for +COVID19.  Heparin Manage/alleviate anxiety  - Utilize distraction and/or relaxation techniques  - Monitor for opioid side effects  - Notify MD/LIP if interventions unsuccessful or patient reports new pain  - Anticipate increased pain with activity and pre-medicate as approp respiratory status  - Assess for changes in mentation and behavior  - Position to facilitate oxygenation and minimize respiratory effort  - Oxygen supplementation based on oxygen saturation or ABGs  - Provide Smoking Cessation handout, if applicable  - Enc

## 2020-04-19 PROCEDURE — 99233 SBSQ HOSP IP/OBS HIGH 50: CPT | Performed by: HOSPITALIST

## 2020-04-19 NOTE — PLAN OF CARE
No acute events throughout shift. Patient on Room Air. 1 stool occurrence. No complaints of pain or nausea. Temps below 100 overnight.      Problem: Patient Centered Care  Goal: Patient preferences are identified and integrated in the patient's plan of care Progressing     Problem: SAFETY ADULT - FALL  Goal: Free from fall injury  Description  INTERVENTIONS:  - Assess pt frequently for physical needs  - Identify cognitive and physical deficits and behaviors that affect risk of falls.   - Railroad fall precaut broncho-pulmonary hygiene including cough, deep breathe, Incentive Spirometry  - Assess the need for suctioning and perform as needed  - Assess and instruct to report SOB or any respiratory difficulty  - Respiratory Therapy support as indicated  - Manage/a

## 2020-04-19 NOTE — PROGRESS NOTES
[place] [facility]    Progress Note    Alfonso Bonner Patient Status:  Inpatient    1940 MRN [id number]   Location [facility] 4W/SW/SE Attending Lorenzo Cano MD   Hosp Day # 7 PCP Puneet Pearl MD       Subjective:   Cristy Roberts TSH 5.080 (H) 04/13/2020    DDIMER 1.24 (H) 04/15/2020    ESRML 81 (H) 10/05/2018    CRP 2.21 (H) 04/16/2020    MG 1.4 (L) 04/15/2020    PHOS 2.6 04/15/2020    TROP <0.045 04/12/2020    CK 50 04/12/2020    B12 408 12/17/2019                   Assessment

## 2020-04-19 NOTE — PLAN OF CARE
Nausea well controlled with ATC Zofran. She was up to chair for dinner. Feeling much better than yesterday. Afebrile.    Problem: Patient Centered Care  Goal: Patient preferences are identified and integrated in the patient's plan of care  Description  Inte SAFETY ADULT - FALL  Goal: Free from fall injury  Description  INTERVENTIONS:  - Assess pt frequently for physical needs  - Identify cognitive and physical deficits and behaviors that affect risk of falls.   - [place] fall precautions as indicated by asse cough, deep breathe, Incentive Spirometry  - Assess the need for suctioning and perform as needed  - Assess and instruct to report SOB or any respiratory difficulty  - Respiratory Therapy support as indicated  - Manage/alleviate anxiety  - Monitor for sign

## 2020-04-20 ENCOUNTER — APPOINTMENT (OUTPATIENT)
Dept: ULTRASOUND IMAGING | Facility: HOSPITAL | Age: 80
DRG: 177 | End: 2020-04-20
Attending: HOSPITALIST
Payer: MEDICARE

## 2020-04-20 ENCOUNTER — APPOINTMENT (OUTPATIENT)
Dept: GENERAL RADIOLOGY | Facility: HOSPITAL | Age: 80
DRG: 177 | End: 2020-04-20
Attending: HOSPITALIST
Payer: MEDICARE

## 2020-04-20 PROCEDURE — 71045 X-RAY EXAM CHEST 1 VIEW: CPT | Performed by: HOSPITALIST

## 2020-04-20 PROCEDURE — 99233 SBSQ HOSP IP/OBS HIGH 50: CPT | Performed by: HOSPITALIST

## 2020-04-20 PROCEDURE — 93970 EXTREMITY STUDY: CPT | Performed by: HOSPITALIST

## 2020-04-20 RX ORDER — ACETAMINOPHEN 325 MG/1
650 TABLET ORAL EVERY 6 HOURS PRN
Status: DISCONTINUED | OUTPATIENT
Start: 2020-04-20 | End: 2020-04-25

## 2020-04-20 RX ORDER — MORPHINE SULFATE 2 MG/ML
1 INJECTION, SOLUTION INTRAMUSCULAR; INTRAVENOUS EVERY 4 HOURS PRN
Status: DISCONTINUED | OUTPATIENT
Start: 2020-04-20 | End: 2020-04-25

## 2020-04-20 NOTE — CM/SW NOTE
CM made aware during nursing rounds that pt is medically stable for dc. SHAHRAM spoke with Tracy from [facility]. Pt had temp of 100.1 on 4/18. Pt has to be fever free for 72 hours prior to dc. [facility] can accept pt on 4/21 as long as she remain fever free.   Updates se

## 2020-04-20 NOTE — PLAN OF CARE
No acute events throughout shift. Patient on Room Air. Tramadol PRN for pain. Nausea treated with scheduled zofran and reglan PRN. Temps below 100 overnight. Plan for return to [facility] when medically stable.      Problem: Patient Centered Care  Goal: Bibiana increased pain with activity and pre-medicate as appropriate  Outcome: Progressing     Problem: SAFETY ADULT - FALL  Goal: Free from fall injury  Description  INTERVENTIONS:  - Assess pt frequently for physical needs  - Identify cognitive and physical defi Provide Smoking Cessation handout, if applicable  - Encourage broncho-pulmonary hygiene including cough, deep breathe, Incentive Spirometry  - Assess the need for suctioning and perform as needed  - Assess and instruct to report SOB or any respiratory diff

## 2020-04-20 NOTE — CM/SW NOTE
RODOLFO spoke with SAMANTHA Nguyen who states pt cannot DC due to the pt spiking a Fever to 101.8. [facility] will not accept the pt due to the fever and would need to be reconsidered in 72 hours. PLAN: DC to [facility] 4/23 Thursday     Smith Jaeger, MSW ext.  [phone]

## 2020-04-20 NOTE — DIETARY NOTE
ADULT NUTRITION REASSESSMENT     Pt is at moderate nutrition risk.   Pt does not meet malnutrition criteria although unable to conduct face to face interview or perform nutrition focused physical exam with pt d/t limited contact restrictions due to their collaboration with other providers    - Discharge and transfer of nutrition care to new setting or provider: monitor plans    ADMITTING DIAGNOSIS:   Hypokalemia [E87.6]  Hypomagnesemia [E83.42]  Acute renal insufficiency [N28.9]  Diarrhea, unspecified type Preferences: Not Obtained    MEDICATIONS: reviewed  • ondansetron  4 mg Oral TID   • Dicyclomine HCl  10 mg Oral QID   • famoTIDine  20 mg Oral Nightly   • Levothyroxine Sodium  137 mcg Oral Before breakfast   • Heparin Sodium (Porcine)  5,000 Units Subcut

## 2020-04-20 NOTE — OCCUPATIONAL THERAPY NOTE
OCCUPATIONAL THERAPY TREATMENT NOTE - INPATIENT        Room Number: 421/421-A           Presenting Problem: poor appetite; COVID19    Problem List  Principal Problem:    QSQNT-54 virus infection  Active Problems:    Hypokalemia    Hypomagnesemia    Diarrhe training; Endurance training;Equipment eval/education    SUBJECTIVE  Agreeable to activity     OBJECTIVE  Precautions: (isolation: contact and droplet)    WEIGHT BEARING RESTRICTION  Weight Bearing Restriction: None                PAIN ASSESSMENT  Ratin Total A    Patient will tolerate standing for 4 minutes in prep for adls with SBA   Comment: NT --pt lethargic, vomiting     Patient will complete item retrieval with SBA  Comment: NT            Goals  on: 2020  Frequency: 3x/wk

## 2020-04-20 NOTE — PROGRESS NOTES
04/20/20 0800   Provider Notification   Reason for Communication Change in status   Provider Name Yvonne Choudhary MD   Method of Communication Page   Response See orders   Notification Time 0800     Pt with complaints of severe pain 10/10 behind left k

## 2020-04-20 NOTE — PHYSICAL THERAPY NOTE
PHYSICAL THERAPY TREATMENT NOTE - INPATIENT     Room Number: 421/421-A       Presenting Problem: +COVID-19    Problem List  Principal Problem:    IZDGJ-22 virus infection  Active Problems:    Hypokalemia    Hypomagnesemia    Diarrhea, unspecified type    A conservation;Patient education; Endurance; Family education;Gait training;Strengthening;Range of motion;Transfer training;Balance training    SUBJECTIVE  \"I feel so sick\"    OBJECTIVE  Precautions: (isolation: contact and droplet)    WEIGHT BEARING RESTRIC met by: 4/23/2020  Patient Goal Patient's self-stated goal is: return to PLOF   Goal #1 Patient is able to demonstrate supine - sit EOB @ level: independent      Goal #1   Current Status  Mod 1-2   Goal #2 Patient is able to demonstrate transfers Sit to/fr

## 2020-04-20 NOTE — PLAN OF CARE
Pt is emotional and not feeling very well today. Emotional support given to patient. Pt febrile this afternoon. 101.8 Dr Glynis Meckel notified. CXR, Urine Cx, Blood Cultures ordered. ID consult ordered.       Problem: Patient Centered Care  Goal: Patient preferen increased pain with activity and pre-medicate as appropriate  Outcome: Not Progressing     Problem: SAFETY ADULT - FALL  Goal: Free from fall injury  Description  INTERVENTIONS:  - Assess pt frequently for physical needs  - Identify cognitive and physical or ABGs  - Provide Smoking Cessation handout, if applicable  - Encourage broncho-pulmonary hygiene including cough, deep breathe, Incentive Spirometry  - Assess the need for suctioning and perform as needed  - Assess and instruct to report SOB or any respi

## 2020-04-21 PROCEDURE — 99233 SBSQ HOSP IP/OBS HIGH 50: CPT | Performed by: HOSPITALIST

## 2020-04-21 RX ORDER — POTASSIUM CHLORIDE 20 MEQ/1
40 TABLET, EXTENDED RELEASE ORAL EVERY 4 HOURS
Status: COMPLETED | OUTPATIENT
Start: 2020-04-21 | End: 2020-04-21

## 2020-04-21 NOTE — CONSULTS
[place] [facility] - [facility] ID CONSULT NOTE    Lee Prose Patient Status:  Inpatient    1940 MRN [id number]   Location [facility] 4W/SW/SE Attending Miguelina Mcmahan MD   [facility] Day # 9 PCP Sherry Michaels MD       Reason for Co • FLEXIBLE SIGMOIDOSCOPY N/A 8/14/2019    Performed by Santo Claude, MD at [facility] ENDOSCOPY   • HERNIA SURGERY  6/16/15     ventral hernia    • HYSTERECTOMY     • KNEE REPLACEMENT SURGERY Right      Family History   Problem Relation Age of Onset   • Canc (NEURONTIN) cap 300 mg, 300 mg, Oral, TID  •  aspirin EC tab 81 mg, 81 mg, Oral, Nightly  •  Sertraline HCl (ZOLOFT) tab 100 mg, 100 mg, Oral, Nightly    Review of Systems:  CONSTITUTIONAL:  No weight loss, weakness or fatigue.   HEENT:  Eyes:  No visual lo Reviewed    ASSESSMENT:    Antibiotics:  OFF  S/p azithromycin    78year old female with a history of endometrial cancer, rectal cancer s/p previous surgical resection/chemo, previous [facility] admission 2/2020 for low back pain, s/p caudal steroid injection 2/

## 2020-04-21 NOTE — PLAN OF CARE
Plan was for patient to be discharged to [facility] today, but patient spiked fever. Afebrile this shift. No complaints of nausea/vomiting/diarrhea or pain. Plan to go to [facility] when afebrile for 72 hours.    Problem: Patient Centered Care Anticipate increased pain with activity and pre-medicate as appropriate  Outcome: Progressing     Problem: SAFETY ADULT - FALL  Goal: Free from fall injury  Description  INTERVENTIONS:  - Assess pt frequently for physical needs  - Identify cognitive and ph mobility/gait  Description  Interventions:  - Assess patient's functional ability and stability  - Promote increasing activity/tolerance for mobility and gait  - Educate and engage patient/family in tolerated activity level and precautions    Outcome: Prog

## 2020-04-22 PROCEDURE — 99233 SBSQ HOSP IP/OBS HIGH 50: CPT | Performed by: HOSPITALIST

## 2020-04-22 RX ORDER — COLCHICINE 0.6 MG/1
0.6 TABLET ORAL DAILY
Status: DISCONTINUED | OUTPATIENT
Start: 2020-04-22 | End: 2020-04-25

## 2020-04-22 RX ORDER — POTASSIUM CHLORIDE 20 MEQ/1
40 TABLET, EXTENDED RELEASE ORAL ONCE
Status: COMPLETED | OUTPATIENT
Start: 2020-04-22 | End: 2020-04-22

## 2020-04-22 NOTE — PLAN OF CARE
Problem: Patient/Family Goals  Goal: Patient/Family Long Term Goal  Description  Patient's Long Term Goal: discharge home    Interventions:  - See additional Care Plan goals for specific interventions   Outcome: Progressing  Goal: Patient/Family Short Te barriers to discharge w/pt and caregiver  - Include patient/family/discharge partner in discharge planning  - Arrange for needed discharge resources and transportation as appropriate  - Identify discharge learning needs (meds, wound care, etc)  - Arrange f mobility/gait  Description  Interventions:  - Assess patient's functional ability and stability  - Promote increasing activity/tolerance for mobility and gait  - Educate and engage patient/family in tolerated activity level and precautions  Outcome: Vladimir

## 2020-04-22 NOTE — PLAN OF CARE
Patient tearful this shift about her lack of progression. Fever again today of 100.1 on day shift. Afebrile overnight. Patient complains of weakness and needing the bedpan for multiple bowel movements throughout the shift. She remains on room air.   Plan is physical deficits and behaviors that affect risk of falls.   - [place] fall precautions as indicated by assessment.  - Educate pt/family on patient safety including physical limitations  - Instruct pt to call for assistance with activity based on assessme respiratory difficulty  - Respiratory Therapy support as indicated  - Manage/alleviate anxiety  - Monitor for signs/symptoms of CO2 retention  Outcome: Progressing     Problem: SKIN/TISSUE INTEGRITY - ADULT  Goal: Skin integrity remains intact  Description

## 2020-04-22 NOTE — PROGRESS NOTES
[facility]    Progress Note    [facility] Patient Status:  Inpatient    1940 MRN [id number]   Location [facility] 4W/SW/SE Attending Aden Vasquez MD   Hosp Day # 10 PCP Jeremias Alegria MD       Subjective:   Charles Prado DDIMER 1.24 (H) 04/15/2020    ESRML 81 (H) 10/05/2018    CRP 2.21 (H) 04/16/2020    MG 1.4 (L) 04/15/2020    PHOS 2.6 04/15/2020    TROP <0.045 04/12/2020    CK 50 04/12/2020    B12 734 04/22/2020                   Assessment and Plan:      COVID-19 virus

## 2020-04-22 NOTE — PROGRESS NOTES
[facility]    Progress Note    Margaelizabeth Hinojosadomenico Patient Status:  Inpatient    1940 MRN [id number]   [facility] 4W/SW/SE Attending Dot Lim MD   Hosp Day # 9 PCP Berenice Cornell MD       Subjective:   George Quinonez 04/15/2020    ESRML 81 (H) 10/05/2018    CRP 2.21 (H) 04/16/2020    MG 1.4 (L) 04/15/2020    PHOS 2.6 04/15/2020    TROP <0.045 04/12/2020    CK 50 04/12/2020    B12 408 12/17/2019       Us Venous Doppler Leg Bilat - Diag Img (cpt=93970)    Result Date: 4/ 25mg  (home dose 50)     Prophylaxis  -hep sq     Dispo--> pending, start discharge planning.  Dc jaeger - pt eval - accepted to [facility] but must be off acetaminophen for 72 hours - dc held until recurrent fevers worked up      Greater than 35 minutes spent, >50

## 2020-04-22 NOTE — PROGRESS NOTES
[place] [facility] - [facility] ROSIE ID PROGRESS NOTE    Summer Escobar Patient Status:  Inpatient    1940 MRN [id number]   Location [facility] 4W/SW/SE Attending Марина Neal MD   Hosp Day # 10 PCP Delano Sheppard MD     Subjective: (premature atrial contraction)     Pelvic mass     Post-operative state     S/P total knee replacement     Severe back pain     Spondylosis of lumbar joint     Compression fracture of thoracic vertebra with routine healing     Anterolisthesis     Spinal st AM.  -  Reviewed labs, micro, imaging reports.  -  Case d/w patient on phone, RN.     Gurvinder Ramos PA-C   [facility]  [phone]  4/22/2020

## 2020-04-23 PROCEDURE — 30233N1 TRANSFUSION OF NONAUTOLOGOUS RED BLOOD CELLS INTO PERIPHERAL VEIN, PERCUTANEOUS APPROACH: ICD-10-PCS | Performed by: HOSPITALIST

## 2020-04-23 PROCEDURE — 99233 SBSQ HOSP IP/OBS HIGH 50: CPT | Performed by: HOSPITALIST

## 2020-04-23 RX ORDER — POTASSIUM CHLORIDE 20 MEQ/1
40 TABLET, EXTENDED RELEASE ORAL EVERY 4 HOURS
Status: DISCONTINUED | OUTPATIENT
Start: 2020-04-23 | End: 2020-04-23

## 2020-04-23 RX ORDER — SODIUM CHLORIDE 9 MG/ML
INJECTION, SOLUTION INTRAVENOUS ONCE
Status: COMPLETED | OUTPATIENT
Start: 2020-04-23 | End: 2020-04-23

## 2020-04-23 RX ORDER — CYANOCOBALAMIN 1000 UG/ML
1000 INJECTION INTRAMUSCULAR; SUBCUTANEOUS ONCE
Status: COMPLETED | OUTPATIENT
Start: 2020-04-23 | End: 2020-04-23

## 2020-04-23 NOTE — PROGRESS NOTES
[place] [facility] - [facility] ID PROGRESS NOTE    Richi Aguillon Patient Status:  Inpatient    1940 MRN [id number]   Location [facility] 4W/SW/SE Attending Jeremias Renteria MD   Hosp Day # 11 PCP Katalina Martinez MD     Subjective: Mixed hyperlipidemia     PAC (premature atrial contraction)     Pelvic mass     Post-operative state     S/P total knee replacement     Severe back pain     Spondylosis of lumbar joint     Compression fracture of thoracic vertebra with routine healing unable to performed L knee aspiration. Hold off for now. -  Follow fever curve, wbc. -  Reviewed labs, micro, imaging reports.  -  Case d/w patient on phone, radiology, primary, RN.  -  ID will continue to follow along peripherally.  Call with any questi

## 2020-04-23 NOTE — CM/SW NOTE
RODOLFO sent updates to [facility] who states after reviewing the pt, pt is accepted however they have no beds available until Saturday 4/25. RODOLFO spoke with Love at [facility] who will notify RODOLFO if a bed opened's up sooner.  There is currently no other accepting facility

## 2020-04-23 NOTE — PROGRESS NOTES
[facility]    Progress Note    Monserrat Cohen Patient Status:  Inpatient    1940 MRN [id number]   Location [facility] 4W/SW/SE Attending Marta Brumfield MD   Hosp Day # 11 PCP Aleksandr Christopher MD       Subjective:   Toribio Alvarado ESRML 81 (H) 10/05/2018    CRP 2.21 (H) 04/16/2020    MG 1.4 (L) 04/15/2020    PHOS 2.6 04/15/2020    TROP <0.045 04/12/2020    CK 50 04/12/2020    B12 734 04/22/2020                   Assessment and Plan:      COVID-19 virus infection  -with GI symptoms

## 2020-04-23 NOTE — PLAN OF CARE
Problem: Patient Centered Care  Goal: Patient preferences are identified and integrated in the patient's plan of care  Description  Interventions:  - What would you like us to know as we care for you?  \"I didn't leave my house before this, I don't know h needs  - Identify cognitive and physical deficits and behaviors that affect risk of falls.   - [place] fall precautions as indicated by assessment.  - Educate pt/family on patient safety including physical limitations  - Instruct pt to call for assistance instruct to report SOB or any respiratory difficulty  - Respiratory Therapy support as indicated  - Manage/alleviate anxiety  - Monitor for signs/symptoms of CO2 retention  Outcome: Progressing     Problem: SKIN/TISSUE INTEGRITY - ADULT  Goal: Skin integri

## 2020-04-24 ENCOUNTER — APPOINTMENT (OUTPATIENT)
Dept: CV DIAGNOSTICS | Facility: HOSPITAL | Age: 80
DRG: 177 | End: 2020-04-24
Attending: HOSPITALIST
Payer: MEDICARE

## 2020-04-24 PROCEDURE — 93308 TTE F-UP OR LMTD: CPT | Performed by: HOSPITALIST

## 2020-04-24 PROCEDURE — 99233 SBSQ HOSP IP/OBS HIGH 50: CPT | Performed by: HOSPITALIST

## 2020-04-24 RX ORDER — POTASSIUM CHLORIDE 20 MEQ/1
40 TABLET, EXTENDED RELEASE ORAL EVERY 4 HOURS
Status: COMPLETED | OUTPATIENT
Start: 2020-04-24 | End: 2020-04-24

## 2020-04-24 RX ORDER — ATENOLOL 50 MG/1
50 TABLET ORAL DAILY
Status: DISCONTINUED | OUTPATIENT
Start: 2020-04-25 | End: 2020-04-25

## 2020-04-24 RX ORDER — MAGNESIUM OXIDE 400 MG (241.3 MG MAGNESIUM) TABLET
800 TABLET ONCE
Status: COMPLETED | OUTPATIENT
Start: 2020-04-24 | End: 2020-04-24

## 2020-04-24 NOTE — OCCUPATIONAL THERAPY NOTE
OCCUPATIONAL THERAPY TREATMENT NOTE - INPATIENT        Room Number: 421/421-A           Presenting Problem: poor appetite; COVID19    Problem List  Principal Problem:    JINAP-44 virus infection  Active Problems:    Hypokalemia    Hypomagnesemia    Diarrhe Activity Short Form. Research supports that patients with this level of impairment may benefit from DEMI. Pt does not demonstrate the physical skills required to return to ILF. Recommend DEMI to maximize participation and independence. Pt is a fall risk. met;Call light within reach;RN aware of session/findings    OT Goals:   Patients self stated goal is: rehab       Patient will complete functional transfer with SBA  Comment:  MAX A x 2    Patient will complete LE dressing with SBA  Comment:  Total A    Pa

## 2020-04-24 NOTE — PHYSICAL THERAPY NOTE
PHYSICAL THERAPY TREATMENT NOTE - INPATIENT     Room Number: 421/421-A       Presenting Problem: +COVID-19    Problem List  Principal Problem:    MXKUQ-17 virus infection  Active Problems:    Hypokalemia    Hypomagnesemia    Diarrhea, unspecified type    A them\"    OBJECTIVE  Precautions: (isolation: contact and droplet)    WEIGHT BEARING RESTRICTION  Weight Bearing Restriction: None                PAIN ASSESSMENT   Rating: Unable to rate  Location: LLE and low back  Management Techniques:  Activity promotio 5/1/2020)  Patient Goal Patient's self-stated goal is: return to PLOF   Goal #1 Patient is able to demonstrate supine - sit EOB @ level: independent      Goal #1   Current Status  Min/Mod A x 1-2   Goal #2 Patient is able to demonstrate transfers Sit to/fr

## 2020-04-24 NOTE — PLAN OF CARE
Problem: Patient Centered Care  Goal: Patient preferences are identified and integrated in the patient's plan of care  Description  Interventions:  - What would you like us to know as we care for you?  \"I didn't leave my house before this, I don't know h needs  - Identify cognitive and physical deficits and behaviors that affect risk of falls.   - [place] fall precautions as indicated by assessment.  - Educate pt/family on patient safety including physical limitations  - Instruct pt to call for assistance instruct to report SOB or any respiratory difficulty  - Respiratory Therapy support as indicated  - Manage/alleviate anxiety  - Monitor for signs/symptoms of CO2 retention  Outcome: Progressing     Problem: SKIN/TISSUE INTEGRITY - ADULT  Goal: Skin integri

## 2020-04-24 NOTE — PROGRESS NOTES
[facility]    Progress Note    Charanjit Thornton Patient Status:  Inpatient    1940 MRN [id number]   Location [facility] 4W/SW/SE Attending Bonnie Lopez MD   Hosp Day # 12 PCP Annika Vega MD       Subjective:   Harvey Rosas DDIMER 1.24 (H) 04/15/2020    ESRML 81 (H) 10/05/2018    CRP 2.21 (H) 04/16/2020    MG 1.5 (L) 04/24/2020    PHOS 2.6 04/15/2020    TROP <0.045 04/12/2020    CK 50 04/12/2020    B12 778 04/23/2020                   Assessment and Plan:      COVID-19 vir

## 2020-04-25 VITALS
DIASTOLIC BLOOD PRESSURE: 64 MMHG | WEIGHT: 126.19 LBS | HEIGHT: 62 IN | BODY MASS INDEX: 23.22 KG/M2 | SYSTOLIC BLOOD PRESSURE: 134 MMHG | OXYGEN SATURATION: 98 % | HEART RATE: 73 BPM | RESPIRATION RATE: 18 BRPM | TEMPERATURE: 99 F

## 2020-04-25 PROCEDURE — 99239 HOSP IP/OBS DSCHRG MGMT >30: CPT | Performed by: HOSPITALIST

## 2020-04-25 RX ORDER — MAGNESIUM OXIDE 400 MG (241.3 MG MAGNESIUM) TABLET
400 TABLET ONCE
Status: COMPLETED | OUTPATIENT
Start: 2020-04-25 | End: 2020-04-25

## 2020-04-25 RX ORDER — DIPHENOXYLATE HYDROCHLORIDE AND ATROPINE SULFATE 2.5; .025 MG/1; MG/1
1 TABLET ORAL 4 TIMES DAILY PRN
Status: DISCONTINUED | OUTPATIENT
Start: 2020-04-25 | End: 2020-04-25

## 2020-04-25 RX ORDER — ALPRAZOLAM 0.25 MG/1
0.25 TABLET ORAL 2 TIMES DAILY PRN
Qty: 10 TABLET | Refills: 0 | Status: SHIPPED | OUTPATIENT
Start: 2020-04-25 | End: 2020-05-05

## 2020-04-25 RX ORDER — LOPERAMIDE HYDROCHLORIDE 2 MG/1
2 CAPSULE ORAL 4 TIMES DAILY
Status: DISCONTINUED | OUTPATIENT
Start: 2020-04-25 | End: 2020-04-25

## 2020-04-25 RX ORDER — TRAMADOL HYDROCHLORIDE 50 MG/1
100 TABLET ORAL EVERY 6 HOURS PRN
Qty: 5 TABLET | Refills: 0 | Status: SHIPPED | OUTPATIENT
Start: 2020-04-25

## 2020-04-25 RX ORDER — DIPHENOXYLATE HYDROCHLORIDE AND ATROPINE SULFATE 2.5; .025 MG/1; MG/1
1 TABLET ORAL 4 TIMES DAILY PRN
Refills: 0 | Status: SHIPPED | COMMUNITY
Start: 2020-04-25

## 2020-04-25 NOTE — CM/SW NOTE
04/25/20 1000   Discharge disposition   Expected discharge disposition Skilled Nurs   Name of Arturo Valdes. 97. Ext   Discharge transportation [facility]       CM confirmed Dc plan with RN Carter Moore.  Pt is on tract to dc today

## 2020-04-25 NOTE — PLAN OF CARE
Overhead lift used to get patient from chair back to bed. She remains on room air with no complaints of pain. Chronic nausea/diarrhea persists.   Plan is to go to [facility] at 2pm.   Problem: Patient Centered Care  Goal: Patient preferences ar activity and pre-medicate as appropriate  Outcome: Progressing     Problem: SAFETY ADULT - FALL  Goal: Free from fall injury  Description  INTERVENTIONS:  - Assess pt frequently for physical needs  - Identify cognitive and physical deficits and behaviors t Cessation handout, if applicable  - Encourage broncho-pulmonary hygiene including cough, deep breathe, Incentive Spirometry  - Assess the need for suctioning and perform as needed  - Assess and instruct to report SOB or any respiratory difficulty  - Respir

## 2020-04-25 NOTE — DISCHARGE SUMMARY
[facility]    Discharge Summary    Karolyn Rapp Patient Status:  Inpatient    1940 MRN [id number]   Location [facility] 4W/SW/SE Attending Irma Estrada MD   Hosp Day # 15 PCP Mary Tejeda MD     Date of Admissio respiratory effort  CV: no jvd  Abd: Abdomen nondistended  Neuro: No acute focal deficits  MSK: Full range of motion in extremities  Skin: Warm and dry  Psych: Normal affect  Ext: no cyanosis - left knee swelling      History of Present Illness:   Jean Srinivasan Instructions Prescription details   ascorbic acid 500 MG Tabs  Commonly known as:  VITAMIN C      Take 1 tablet (500 mg total) by mouth 2 (two) times daily.    Quantity:  60 tablet  Refills:  0     diphenoxylate-atropine 2.5-0.025 MG Tabs  Commonly known as Refills:  0     famotidine 40 MG Tabs  Commonly known as:  PEPCID      Take 1 tablet (40 mg total) by mouth nightly.    Quantity:  30 tablet  Refills:  2     ferrous sulfate 325 (65 FE) MG Tbec      Take 325 mg by mouth daily with breakfast.   Refills:  0 sulfate 220 (50 Zn) MG Caps     Please  your prescriptions at the location directed by your doctor or nurse    Bring a paper prescription for each of these medications  · ALPRAZolam 0.25 MG Tabs  · traMADol HCl 50 MG Tabs         Follow up Visits: F

## 2020-04-25 NOTE — PLAN OF CARE
Plan for [place]/c [place] [facility] via ambulance. Vitals stable. Chronic diarrhea, receiving immodium. Family notified of discharge today. Report given to RN at [facility].   Problem: Patient Centered Care  Goal: Patient preferences are identified and integrated in the activity and pre-medicate as appropriate  Outcome: Adequate for Discharge     Problem: SAFETY ADULT - FALL  Goal: Free from fall injury  Description  INTERVENTIONS:  - Assess pt frequently for physical needs  - Identify cognitive and physical deficits and or ABGs  - Provide Smoking Cessation handout, if applicable  - Encourage broncho-pulmonary hygiene including cough, deep breathe, Incentive Spirometry  - Assess the need for suctioning and perform as needed  - Assess and instruct to report SOB or any respi

## 2020-04-28 ENCOUNTER — EXTERNAL FACILITY (OUTPATIENT)
Dept: FAMILY MEDICINE CLINIC | Facility: CLINIC | Age: 80
End: 2020-04-28

## 2020-04-28 DIAGNOSIS — U07.1 COVID-19 VIRUS INFECTION: ICD-10-CM

## 2020-04-28 DIAGNOSIS — D50.8 OTHER IRON DEFICIENCY ANEMIA: ICD-10-CM

## 2020-04-28 DIAGNOSIS — R53.1 WEAKNESS: ICD-10-CM

## 2020-04-28 DIAGNOSIS — N28.9 ACUTE RENAL INSUFFICIENCY: ICD-10-CM

## 2020-04-28 PROCEDURE — 99306 1ST NF CARE HIGH MDM 50: CPT | Performed by: FAMILY MEDICINE

## 2020-05-01 ENCOUNTER — EXTERNAL FACILITY (OUTPATIENT)
Dept: FAMILY MEDICINE CLINIC | Facility: CLINIC | Age: 80
End: 2020-05-01

## 2020-05-01 ENCOUNTER — TELEPHONE (OUTPATIENT)
Dept: NEUROLOGY | Facility: CLINIC | Age: 80
End: 2020-05-01

## 2020-05-01 DIAGNOSIS — R53.1 WEAKNESS: ICD-10-CM

## 2020-05-01 DIAGNOSIS — U07.1 COVID-19 VIRUS INFECTION: Primary | ICD-10-CM

## 2020-05-01 DIAGNOSIS — D64.9 ANEMIA, UNSPECIFIED TYPE: ICD-10-CM

## 2020-05-01 DIAGNOSIS — N28.9 ACUTE RENAL INSUFFICIENCY: ICD-10-CM

## 2020-05-01 PROCEDURE — 1111F DSCHRG MED/CURRENT MED MERGE: CPT | Performed by: FAMILY MEDICINE

## 2020-05-01 PROCEDURE — 99305 1ST NF CARE MODERATE MDM 35: CPT | Performed by: FAMILY MEDICINE

## 2020-05-01 NOTE — TELEPHONE ENCOUNTER
Tried calling patient, no answer/voicemail. Will call again later.      Medication request: Gabapentin 300mg    LOV: 03/09/20 for Caudal inj  NOV: none    [place] 03/27/20 #90 r-0

## 2020-05-01 NOTE — PROGRESS NOTES
HPI:    Patient ID: Viry Montenegro is a 78year old female. HPI  Pt presents to [facility] for sub acute rehab following hospitalization for COVID -19 with mostly GI symptoms and dehydration.   She has recently completed chemo months ago and has had multipl cyclobenzaprine 5 MG Oral Tab Take 5 mg by mouth 3 (three) times daily as needed for Muscle spasms.      • omeprazole 20 MG Oral Capsule Delayed Release Take 20 mg by mouth every morning before breakfast.     • PEG 3350 Oral Powd Pack Take 17 g by mouth marv UNDER FLUOROSCOPY N/A 2/17/2020    Performed by Maria Del Rosario Reilly DO at [facility] MAIN OR   • CHOLECYSTECTOMY     • [address] N/A 8/14/2019    Performed by Paresh Lopez MD at [facility] ENDOSCOPY   • HERNIA SURGERY  6/16/15     ventral hernia    • HYSTERE Vitals reviewed. ASSESSMENT/PLAN:   Covid-19 virus infection  (primary encounter diagnosis)  Acute renal insufficiency  Anemia, unspecified type  Weakness    CPM.  Follow up labs.  Continue PT and OT.  repsponding well to treatment and beginning

## 2020-05-02 ENCOUNTER — TELEPHONE (OUTPATIENT)
Dept: FAMILY MEDICINE CLINIC | Facility: CLINIC | Age: 80
End: 2020-05-02

## 2020-05-08 ENCOUNTER — EXTERNAL FACILITY (OUTPATIENT)
Dept: FAMILY MEDICINE CLINIC | Facility: CLINIC | Age: 80
End: 2020-05-08

## 2020-05-08 DIAGNOSIS — N28.9 ACUTE RENAL INSUFFICIENCY: ICD-10-CM

## 2020-05-08 DIAGNOSIS — R42 VERTIGO: ICD-10-CM

## 2020-05-08 DIAGNOSIS — U07.1 COVID-19 VIRUS INFECTION: Primary | ICD-10-CM

## 2020-05-08 DIAGNOSIS — F41.9 ANXIETY: ICD-10-CM

## 2020-05-08 PROCEDURE — 99309 SBSQ NF CARE MODERATE MDM 30: CPT | Performed by: FAMILY MEDICINE

## 2020-05-08 NOTE — PROGRESS NOTES
HPI:    Patient ID: Jesus Bai is a 78year old female. HPI  Pt seen and evaluated at [place] [id number] rehab enter. New problem of vertigo. New problem of left knee pain. Review of Systems   Constitutional: Negative.     Eyes: Negative

## 2020-05-29 NOTE — PROGRESS NOTES
. History of Present Illness:    H      Pt presents from [facility] for subacute rehab.  she had presented with fatigue, diarrhea, nausea and urinary retention. she was COVID 19 positive. she received azithro. and plaquinil. she recovered.  no significant pneu 6.      a.  Dental Problems     b. Ear Pain     c.  Hearing Loss     d. Nasal Congestion     e. Nosebleeds     f. Sinus Pain     g. Sores in Mouth     h. Sore Throat     i. Tinnitus     j. Trouble Swallowing     k. Voice Change     l.   Other G. Physical Exam                 Physical Examination   1. All other systems negative    H   Constitutional   2.      a. Frail     b. Obese     c.  Pleasant/Conversant     d. No apparent distress     e. Other     H   2f.  Comments:   H   Eyes or maroon localized area of discolored intact skin or blood-filled blister due to damage of underlying soft tissue from pressure and/or shear.  The area may be preceded by tissue that is painful, firm, mushy, boggy, warmer or cooler as compared to adjacent (back)30) Left hand (back)31) Right drdqrss11) Left fuprqug07) Right thigh (front)34) Left thigh (front)35) Right thigh (rear)36) Left thigh (rear)37) Right knee (front)38) Left knee (front)39) Right knee (rear)40) Left knee (rear)41) Right lower leg (fron Suspected Deep Tissue InjuryIIIIIIIVUnstageableN/A         1) Top of Scalp2) Right ear3) Left ear4) Face5) Back of head6) Right shoulder (front)7) Left shoulder (front)8) Right shoulder (rear)9) Left shoulder (rear)10) Right gqiwmcq60) Left qtzdyyd79) Ches (front)34) Left thigh (front)35) Right thigh (rear)36) Left thigh (rear)37) Right knee (front)38) Left knee (front)39) Right knee (rear)40) Left knee (rear)41) Right lower leg (front)42) Left lower leg (front)43) Right lower leg (rear)44) Left lower leg (r Right ear3) Left ear4) Face5) Back of head6) Right shoulder (front)7) Left shoulder (front)8) Right shoulder (rear)9) Left shoulder (rear)10) Right cnuekfb94) Left ieqivld45) Chest13) Vertebrae (upper-mid)14) Mjgbuhl60) Right imjvnzeavnn08) Left antecubita Right knee (front)38) Left knee (front)39) Right knee (rear)40) Left knee (rear)41) Right lower leg (front)42) Left lower leg (front)43) Right lower leg (rear)44) Left lower leg (rear)45) Right ankle (inner)46) Left ankle (inner)47) Right ankle (outer)48) shoulder (front)8) Right shoulder (rear)9) Left shoulder (rear)10) Right pyfxmsz89) Left piuliml86) Chest13) Vertebrae (upper-mid)14) Jimsmwb12) Right ceejssrxcru22) Left cqeawvzxidv17) Right elbow18) Left elbow19) Right iliac crest (front)20) Left iliac c (rear)41) Right lower leg (front)42) Left lower leg (front)43) Right lower leg (rear)44) Left lower leg (rear)45) Right ankle (inner)46) Left ankle (inner)47) Right ankle (outer)48) Left ankle (outer)49) Right heel50) Left heel51) Right toe(s)52) Left toe(

## 2020-08-04 ENCOUNTER — TELEPHONE (OUTPATIENT)
Dept: NEUROLOGY | Facility: CLINIC | Age: 80
End: 2020-08-04

## 2020-08-04 NOTE — TELEPHONE ENCOUNTER
Pt called requesting a repeat caudal epidural steroid injection. States her back symptoms are not as bad as they were prior to her last injection, feels she would like to move forward w/ repeat injections before they get as bad as they did last time.  Rates

## 2020-08-06 ENCOUNTER — OFFICE VISIT (OUTPATIENT)
Dept: NEUROLOGY | Facility: CLINIC | Age: 80
End: 2020-08-06
Payer: MEDICARE

## 2020-08-06 VITALS — HEIGHT: 62 IN | WEIGHT: 126 LBS | BODY MASS INDEX: 23.19 KG/M2

## 2020-08-06 DIAGNOSIS — Z85.51 HISTORY OF BLADDER CANCER: ICD-10-CM

## 2020-08-06 DIAGNOSIS — M43.10 ANTEROLISTHESIS: ICD-10-CM

## 2020-08-06 DIAGNOSIS — U07.1 COVID-19 VIRUS INFECTION: ICD-10-CM

## 2020-08-06 DIAGNOSIS — M47.816 FACET ARTHROPATHY, LUMBAR: Primary | ICD-10-CM

## 2020-08-06 DIAGNOSIS — M48.061 SPINAL STENOSIS AT L4-L5 LEVEL: ICD-10-CM

## 2020-08-06 DIAGNOSIS — M47.816 SPONDYLOSIS OF LUMBAR JOINT: ICD-10-CM

## 2020-08-06 DIAGNOSIS — C20 RECTAL CANCER (HCC): ICD-10-CM

## 2020-08-06 DIAGNOSIS — S22.080D COMPRESSION FRACTURE OF T11 VERTEBRA WITH ROUTINE HEALING, SUBSEQUENT ENCOUNTER: ICD-10-CM

## 2020-08-06 DIAGNOSIS — C56.9 MALIGNANT NEOPLASM OF OVARY, UNSPECIFIED LATERALITY (HCC): ICD-10-CM

## 2020-08-06 DIAGNOSIS — F41.9 ANXIETY: ICD-10-CM

## 2020-08-06 PROCEDURE — 99214 OFFICE O/P EST MOD 30 MIN: CPT | Performed by: PHYSICAL MEDICINE & REHABILITATION

## 2020-08-06 RX ORDER — GABAPENTIN 100 MG/1
100 CAPSULE ORAL 3 TIMES DAILY
Qty: 90 CAPSULE | Refills: 0 | Status: SHIPPED | OUTPATIENT
Start: 2020-08-06 | End: 2020-09-21

## 2020-08-06 NOTE — TELEPHONE ENCOUNTER
Patient was scheduled for Bilateral L3-4, L4-5 and L5-S1 Facet joint injection under fluoroscopy guidance under local anesthesia on Monday, August 17, 2020. Medications and allergies reviewed. Patient informed she will need a .  Patient informed no

## 2020-08-06 NOTE — TELEPHONE ENCOUNTER
Medicare Online for insurance coverage of Bilateral L3-4, L4-5 and L5-S1 Facet joint injection under fluoroscopy cpt codes 07772-90, 73043-RV, 14193-ZH, 46751-JR, 03785-BE. Insurance was verified and procedure  is a covered benefit.  Authorization is not re

## 2020-08-06 NOTE — PATIENT INSTRUCTIONS
-My office will call when injection is approved  -Start Gabapentin medication daily  -Follow up 4 weeks after injection  -Get 3 prong cane  -If no better, will discuss epidural injection

## 2020-08-06 NOTE — TELEPHONE ENCOUNTER
LMTCB to schedule bilateral L3-4, L4-5 and L5-S1 Facet joint injection under fluoroscopy guidance under local anesthesia.

## 2020-08-06 NOTE — PROGRESS NOTES
[address] [facility]  FOLLOW UP EVALUATION      Chief Complaint: back pain. HISTORY OF PRESENT ILLNESS:   Patient presents with:  Low Back Pain: LOV: 3/3/20. F/U on lower back pain.  Patient states that she has been and central stenosis. She had a caudal epidural injection with me during the hospital visit and since has been discharged to subacute rehab. She is currently at [facility] and is doing well overall.   She is scheduled to be discharged on Thursday home wit 8/14/2019    Performed by Greyson Helms MD at [facility] ENDOSCOPY   • HERNIA SURGERY  6/16/15     ventral hernia    • HYSTERECTOMY     • KNEE REPLACEMENT SURGERY Right          CURRENT MEDICATIONS:   diphenoxylate-atropine 2.5-0.025 MG Oral Tab, Take 1 tablet current facility-administered medications on file prior to visit.        ALLERGIES:     Compazine [Prochlor*    OTHER (SEE COMMENTS)    Comment:Pt does not recall the reaction  Sulfa Antibiotics       RASH      FAMILY HISTORY:     Family History   Problem R respirations  Abdomen: No abdominal guarding  Extremities: No lower extremity edema bilaterally   Skin: No lesions noted   Cognition: alert & oriented x 3, attentive, able to follow 2 step commands, comprehention intact, spontaneous speech intact  Psychiat Component Value Date    VITD 25.3 03/23/2018       IMAGING:     MRI LUMBAR SPINE dated February 14, 2020 revealed:   CONCLUSION:   1. Subacute bilateral sacral insufficiency fractures . 2. Mild chronic T11 vertebral compression fracture.   3. No evidence L3-4, L4-5 and L5-S1 facet joint injections under fluoroscopy guidance under local anesthesia. The risks and benefits of the injection were discussed with the patient today and she elected to have the procedure done.   Advised the patient my office will ca

## 2020-08-17 ENCOUNTER — OFFICE VISIT (OUTPATIENT)
Dept: SURGERY | Facility: CLINIC | Age: 80
End: 2020-08-17

## 2020-08-17 DIAGNOSIS — M47.816 FACET ARTHROPATHY, LUMBAR: Primary | ICD-10-CM

## 2020-08-17 PROCEDURE — 64493 INJ PARAVERT F JNT L/S 1 LEV: CPT | Performed by: PHYSICAL MEDICINE & REHABILITATION

## 2020-08-17 PROCEDURE — 64494 INJ PARAVERT F JNT L/S 2 LEV: CPT | Performed by: PHYSICAL MEDICINE & REHABILITATION

## 2020-08-17 PROCEDURE — 64495 INJ PARAVERT F JNT L/S 3 LEV: CPT | Performed by: PHYSICAL MEDICINE & REHABILITATION

## 2020-08-17 NOTE — PROCEDURES
[facility] Z-JOINT/FACET INJECTIONS  NAME:  Viry Montenegro    MR #:    [id number] :  1940     PHYSICIAN:  Shelli Bernal        Operative Report    DATE OF PROCEDURE: 2020   PREOPERATIVE DIAGNOSES: 1.  Face whole procedure, the patient's pulse oximetry and vital signs were monitored and they remained completely stable. Also, throughout the whole procedure, prior to injection of any medication, aspiration was performed.   No blood, fluid, or air was aspirated

## 2020-09-21 RX ORDER — GABAPENTIN 100 MG/1
CAPSULE ORAL
Qty: 90 CAPSULE | Refills: 0 | Status: SHIPPED | OUTPATIENT
Start: 2020-09-21 | End: 2021-01-28

## 2020-09-21 NOTE — TELEPHONE ENCOUNTER
Medication request: Gabapentin 100mg 1 CAP TID    LOV: 08/17/20 facet inj  NOV: none    [place] 08/06/20 #90 r-0

## 2020-10-27 ENCOUNTER — OFFICE VISIT (OUTPATIENT)
Dept: NEUROLOGY | Facility: CLINIC | Age: 80
End: 2020-10-27
Payer: MEDICARE

## 2020-10-27 ENCOUNTER — TELEPHONE (OUTPATIENT)
Dept: NEUROLOGY | Facility: CLINIC | Age: 80
End: 2020-10-27

## 2020-10-27 DIAGNOSIS — M43.10 ANTEROLISTHESIS: ICD-10-CM

## 2020-10-27 DIAGNOSIS — Z85.51 HISTORY OF BLADDER CANCER: ICD-10-CM

## 2020-10-27 DIAGNOSIS — M47.816 SPONDYLOSIS OF LUMBAR JOINT: ICD-10-CM

## 2020-10-27 DIAGNOSIS — S22.080D COMPRESSION FRACTURE OF T11 VERTEBRA WITH ROUTINE HEALING, SUBSEQUENT ENCOUNTER: ICD-10-CM

## 2020-10-27 DIAGNOSIS — M48.061 SPINAL STENOSIS AT L4-L5 LEVEL: ICD-10-CM

## 2020-10-27 DIAGNOSIS — M47.816 FACET ARTHROPATHY, LUMBAR: Primary | ICD-10-CM

## 2020-10-27 DIAGNOSIS — F41.9 ANXIETY: ICD-10-CM

## 2020-10-27 PROCEDURE — 99214 OFFICE O/P EST MOD 30 MIN: CPT | Performed by: PHYSICAL MEDICINE & REHABILITATION

## 2020-10-27 RX ORDER — SPIRONOLACTONE 25 MG/1
25 TABLET ORAL EVERY MORNING
COMMUNITY
Start: 2020-09-22 | End: 2020-10-27

## 2020-10-27 NOTE — PATIENT INSTRUCTIONS
-Increase Gabapentin to 3x daily  -My office will call when the injection is approved  -Ice/heat as much as tolerated  -Topical Lidocaine patch as needed

## 2020-10-27 NOTE — PROGRESS NOTES
[address]  FOLLOW UP EVALUATION      Chief Complaint: back pain.     HISTORY OF PRESENT ILLNESS:   Patient presents with:  Low Back Pain: pt here for follow up s/p bilateral L3-4, L4-5 and L5-S1 Z-joint/face extremities she has occasional numbness in the left lateral thigh. She denies any recent falls, any loss of bowel bladder control, any fevers chills or weight loss.   Patient has not been taking gabapentin medication as she does not know if the pain would HISTORY:     Past Medical History:   Diagnosis Date   • Anxiety state, unspecified    • Cataract    • Depression    • High blood pressure    • MGUS (monoclonal gammopathy of unknown significance)    • Ovarian ca [facility])    • Pernicious anemia    • Rectal can tablet (100 mg total) by mouth daily. , Disp: 30 tablet, Rfl: 2  •  cyanocobalamin 1000 MCG/ML Injection Solution, Inject 1 mL (1,000 mcg total) into the skin every 30 (thirty) days. , Disp: , Rfl: 0  •  diphenhydrAMINE HCl 25 MG Oral Cap, Take 1 capsule (25 immediate distress  Head: Normocephalic/ Atraumatic  Eyes: Extra-occular movements intact. Ears: No auricular hematoma or deformities  Mouth: No lesions or ulcerations  Heart: peripheral pulses intact. Normal capillary refill.    Lungs: Non-labored respir 04/13/2020    BILT 0.2 04/13/2020    TP 6.5 04/13/2020    ALB 2.2 (L) 04/15/2020    GLOBULIN 4.1 04/13/2020     04/25/2020    K 4.1 04/25/2020     (H) 04/25/2020    CO2 17.0 (L) 04/25/2020     Lab Results   Component Value Date    PTP 15.7 (H) continues to have exacerbations with pain in the lumbar spine, she may benefit from radiofrequency ablation of the nerves as well. The patient verbalized understanding with the plan and was in agreement.  All questions/concerns were addressed and there w

## 2020-10-27 NOTE — TELEPHONE ENCOUNTER
Medicare Online for insurance coverage of Bilateral L3-4, L4-5 and L5-S1 diagnositc facet joint injection under fluoroscopy cpt codes 63050-55, 97454-BZ, 10492-LU, 67792-XB, 29312-SE. Insurance was verified and procedure  is a covered benefit.  Authorizatio

## 2020-10-27 NOTE — TELEPHONE ENCOUNTER
LMTCB-to schedule Bilateral L3-4, L4-5 and L5-S1 diagnositc facet joint injection under fluoroscopy guidance under local anesthesia

## 2020-10-27 NOTE — TELEPHONE ENCOUNTER
Informed patient Dr. Eileen Cerrato will need to evaluate her prior to ordering epidural injection. Patient given appt at [address] [place] [address] today.

## 2020-10-28 NOTE — TELEPHONE ENCOUNTER
Patient has been scheduled for a Bilateral L3-4, L4-5 and L5-S1 diagnositc facet joint injection under fluoroscopy guidance under local anesthesia on 11/02/20 at the [facility]. Medications and allergies reviewed.  Patient informed to hold aspirins, nsaids, blood

## 2020-11-02 ENCOUNTER — OFFICE VISIT (OUTPATIENT)
Dept: SURGERY | Facility: CLINIC | Age: 80
End: 2020-11-02

## 2020-11-02 DIAGNOSIS — M47.816 FACET ARTHROPATHY, LUMBAR: Primary | ICD-10-CM

## 2020-11-02 PROCEDURE — 64493 INJ PARAVERT F JNT L/S 1 LEV: CPT | Performed by: PHYSICAL MEDICINE & REHABILITATION

## 2020-11-02 PROCEDURE — 64494 INJ PARAVERT F JNT L/S 2 LEV: CPT | Performed by: PHYSICAL MEDICINE & REHABILITATION

## 2020-11-02 PROCEDURE — 64495 INJ PARAVERT F JNT L/S 3 LEV: CPT | Performed by: PHYSICAL MEDICINE & REHABILITATION

## 2020-11-02 NOTE — PROGRESS NOTES
[facility] Z-JOINT/FACET INJECTIONS  NAME:  Monserrat Cohen    MR #:    [id number] :  1940     PHYSICIAN:  Justyna Ibarra. Gabe        Operative Report    DATE OF PROCEDURE: 2020   PREOPERATIVE DIAGNOSES: 1.  Face whole procedure, the patient's pulse oximetry and vital signs were monitored and they remained completely stable. Also, throughout the whole procedure, prior to injection of any medication, aspiration was performed.   No blood, fluid, or air was aspirated

## 2021-01-04 ENCOUNTER — TELEPHONE (OUTPATIENT)
Dept: NEUROLOGY | Facility: CLINIC | Age: 81
End: 2021-01-04

## 2021-01-04 ENCOUNTER — OFFICE VISIT (OUTPATIENT)
Dept: NEUROLOGY | Facility: CLINIC | Age: 81
End: 2021-01-04
Payer: MEDICARE

## 2021-01-04 VITALS — WEIGHT: 127 LBS | HEIGHT: 61 IN | BODY MASS INDEX: 23.98 KG/M2

## 2021-01-04 DIAGNOSIS — F41.9 ANXIETY: ICD-10-CM

## 2021-01-04 DIAGNOSIS — Z85.51 HISTORY OF BLADDER CANCER: ICD-10-CM

## 2021-01-04 DIAGNOSIS — M67.952 TENDINOPATHY OF LEFT GLUTEUS MEDIUS: ICD-10-CM

## 2021-01-04 DIAGNOSIS — C20 RECTAL CANCER (HCC): ICD-10-CM

## 2021-01-04 DIAGNOSIS — M48.061 SPINAL STENOSIS AT L4-L5 LEVEL: Primary | ICD-10-CM

## 2021-01-04 DIAGNOSIS — M43.10 ANTEROLISTHESIS: ICD-10-CM

## 2021-01-04 DIAGNOSIS — S22.080D COMPRESSION FRACTURE OF T11 VERTEBRA WITH ROUTINE HEALING, SUBSEQUENT ENCOUNTER: ICD-10-CM

## 2021-01-04 DIAGNOSIS — C56.9 MALIGNANT NEOPLASM OF OVARY, UNSPECIFIED LATERALITY (HCC): ICD-10-CM

## 2021-01-04 PROCEDURE — 99214 OFFICE O/P EST MOD 30 MIN: CPT | Performed by: PHYSICAL MEDICINE & REHABILITATION

## 2021-01-04 RX ORDER — HYDROCODONE BITARTRATE AND ACETAMINOPHEN 5; 325 MG/1; MG/1
1 TABLET ORAL EVERY 8 HOURS PRN
Qty: 30 TABLET | Refills: 0 | Status: SHIPPED | OUTPATIENT
Start: 2021-01-04

## 2021-01-04 NOTE — PROGRESS NOTES
[address]  FOLLOW UP EVALUATION      Chief Complaint: back pain. HISTORY OF PRESENT ILLNESS:   Patient presents with: Injection: Facet arthropathy lumbar injections 11/02/2020 FU.  Patient presents today home    10/27/20  Patient is here for follow-up evaluation of low back pain. She recently had facet joint injections in August approximately 2 months ago which provided great improvement in her pain.   However, over the last 2 weeks she is starting to expe ureteral cancer s/p chemotherapy and surgery, type II DM, anxiety, depression who is here for hospital follow-up for low back pain.   Patient was recently admitted in the hospital at which time I evaluated her for severe spinal stenosis at L4-5 secondary to [facility])          PAST SURGICAL HISTORY:     Past Surgical History:   Procedure Laterality Date   • APPENDECTOMY     • BOWEL RESECTION  6/16/15    • CAUDAL EPIDURAL STEROID INJECTION UNDER FLUOROSCOPY N/A 2/17/2020    Performed by Berlin Caballero DO at [address] [place] [address] skin every 30 (thirty) days. , Disp: , Rfl: 0  •  diphenhydrAMINE HCl 25 MG Oral Cap, Take 1 capsule (25 mg total) by mouth every 4 (four) hours as needed for Itching., Disp: , Rfl: 0  •  aspirin 81 MG Oral Tab, Take 81 mg by mouth daily. , Disp: , Rfl:   • respirations  Abdomen: No abdominal guarding  Extremities: No lower extremity edema bilaterally   Skin: No lesions noted   Cognition: alert & oriented x 3, attentive, able to follow 2 step commands, comprehention intact, spontaneous speech intact  Psychiat 04/12/2020     Lab Results   Component Value Date    VITD 25.3 03/23/2018       IMAGING:     MRI LUMBAR SPINE dated February 14, 2020 revealed:   CONCLUSION:   1. Subacute bilateral sacral insufficiency fractures .   2. Mild chronic T11 vertebral compressio verbalized understanding with the plan and was in agreement. All questions/concerns were addressed and there were no barriers to learning. Bobby MONTELONGO [address] [facility]  Physical Medicine and Rehabilitation/Sports Medicine

## 2021-01-11 ENCOUNTER — OFFICE VISIT (OUTPATIENT)
Dept: SURGERY | Facility: CLINIC | Age: 81
End: 2021-01-11

## 2021-01-11 ENCOUNTER — TELEPHONE (OUTPATIENT)
Dept: NEUROLOGY | Facility: CLINIC | Age: 81
End: 2021-01-11

## 2021-01-11 DIAGNOSIS — M48.061 SPINAL STENOSIS AT L4-L5 LEVEL: Primary | ICD-10-CM

## 2021-01-11 PROCEDURE — 64484 NJX AA&/STRD TFRM EPI L/S EA: CPT | Performed by: PHYSICAL MEDICINE & REHABILITATION

## 2021-01-11 PROCEDURE — 64483 NJX AA&/STRD TFRM EPI L/S 1: CPT | Performed by: PHYSICAL MEDICINE & REHABILITATION

## 2021-01-11 NOTE — PROCEDURES
[address]    2-LEVEL LUMBAR TRANSFORAMINAL   NAME:  Georgie Orozco    MR #:    [id number] :  1940     PHYSICIAN:  Frederic Bernal        Operative Report    DATE OF PROCEDURE: 2021   PREOPERATIVE DIAGNOSES: 1.  Spina given discharge instructions and will follow up in the clinic as scheduled. Throughout the whole procedure, the patient's pulse oximetry and vital signs were monitored and they remained completely stable.   Also, throughout the whole procedure, prior to in

## 2021-01-12 ENCOUNTER — TELEPHONE (OUTPATIENT)
Dept: NEUROLOGY | Facility: CLINIC | Age: 81
End: 2021-01-12

## 2021-01-28 ENCOUNTER — OFFICE VISIT (OUTPATIENT)
Dept: NEUROLOGY | Facility: CLINIC | Age: 81
End: 2021-01-28
Payer: MEDICARE

## 2021-01-28 ENCOUNTER — TELEPHONE (OUTPATIENT)
Dept: NEUROLOGY | Facility: CLINIC | Age: 81
End: 2021-01-28

## 2021-01-28 VITALS
DIASTOLIC BLOOD PRESSURE: 56 MMHG | WEIGHT: 127 LBS | SYSTOLIC BLOOD PRESSURE: 116 MMHG | HEIGHT: 61 IN | HEART RATE: 68 BPM | OXYGEN SATURATION: 99 % | BODY MASS INDEX: 23.98 KG/M2

## 2021-01-28 DIAGNOSIS — M48.061 SPINAL STENOSIS AT L4-L5 LEVEL: Primary | ICD-10-CM

## 2021-01-28 DIAGNOSIS — M43.10 ANTEROLISTHESIS: ICD-10-CM

## 2021-01-28 DIAGNOSIS — M67.952 TENDINOPATHY OF LEFT GLUTEUS MEDIUS: ICD-10-CM

## 2021-01-28 PROCEDURE — 99214 OFFICE O/P EST MOD 30 MIN: CPT | Performed by: PHYSICAL MEDICINE & REHABILITATION

## 2021-01-28 RX ORDER — GABAPENTIN 300 MG/1
CAPSULE ORAL
Qty: 90 CAPSULE | Refills: 0 | Status: SHIPPED | OUTPATIENT
Start: 2021-01-28

## 2021-01-28 NOTE — PATIENT INSTRUCTIONS
-My office will call when injection is approved  -Start Gabapentin 300mg at nighttime and slowly increase to 3x daily as tolerated  -Start PT and home exercises  -Start using rollator

## 2021-01-28 NOTE — PROGRESS NOTES
[address]  FOLLOW UP EVALUATION      Chief Complaint: back pain. HISTORY OF PRESENT ILLNESS:   Patient presents with: Injection: Left L4 and L5 transforaminal MADHAVI.  Patient states that injection was 25% difficult time sleeping. Pain is rated 10 out of 10. She has had chronic urinary urgency with no changes. She denies any foot drop or acute weakness. She denies any saddle anesthesia. She denies any fevers chills or weight loss.   She is additionally h lower aspect, rated aching with standing and ambulating, improved with rest.  She is taking as she cannot tolerate. She does not have any radicular symptoms in the lower extremities she has occasional numbness in the left lateral thigh.   She denies any re discharged with home therapy as well.   Patient denies any changes in sensation, strength or bowel bladder habits since last time I saw her in the hospital.      PAST MEDICAL HISTORY:     Past Medical History:   Diagnosis Date   • Anxiety state, unspecified capsule, Rfl: 0  •  ALPRAZolam 0.25 MG Oral Tab, TAKE 1 TABLET BY MOUTH TWICE DAILY AS NEEDED, Disp: 5 tablet, Rfl: 0  •  PEG 3350 Oral Powd Pack, Take 17 g by mouth daily. , Disp: , Rfl:   •  docusate sodium 100 MG Oral Cap, Take 100 mg by mouth 2 (two) ti denies  Irregular Heartbeat: denies   Gastrointestinal  Bowel Incontinence: denies  Heartburn: denies   Genitourinary  Difficulty Urinating: denies  Bladder Incontinence: denies   Musculoskeletal  Joint Stiffness: denies  Painful Joints: denies   Neurologi 04/24/2020    .4 (H) 04/24/2020    MCH 33.3 04/24/2020    MCHC 33.2 04/24/2020    RDW 16.3 (H) 04/24/2020    .0 04/24/2020    MPV 7.7 11/12/2018     Lab Results   Component Value Date     (H) 04/25/2020    BUN 19 (H) 04/25/2020    [facility] She did not have any significant improvement with the transforaminal epidural steroid injection. Given this I recommended a intralaminar L5-S1 epidural steroid injection under fluoroscopy guidance under local anesthesia.   Patient did have great improvemen

## 2021-01-28 NOTE — TELEPHONE ENCOUNTER
Patient has been scheduled for a L5-S1 Interlaminar Epidural Steroid Injection under fluoroscopy on 2/8/21 at the [facility]. Medications and allergies reviewed.  Patient informed we will need verbal or written authorization from patients Primary Care Physician/C

## 2021-01-28 NOTE — TELEPHONE ENCOUNTER
Per Medicare guidelines, authorization is not required for L5-S1 Interlaminar Epidural Steroid Injection under fluoroscopy cpt code 29920. Will inform Nursing.

## 2021-02-01 DIAGNOSIS — Z23 NEED FOR VACCINATION: ICD-10-CM

## 2021-02-08 ENCOUNTER — TELEPHONE (OUTPATIENT)
Dept: NEUROLOGY | Facility: CLINIC | Age: 81
End: 2021-02-08

## 2021-02-08 ENCOUNTER — OFFICE VISIT (OUTPATIENT)
Dept: SURGERY | Facility: CLINIC | Age: 81
End: 2021-02-08

## 2021-02-08 DIAGNOSIS — M48.061 SPINAL STENOSIS AT L4-L5 LEVEL: Primary | ICD-10-CM

## 2021-02-08 PROCEDURE — 62323 NJX INTERLAMINAR LMBR/SAC: CPT | Performed by: PHYSICAL MEDICINE & REHABILITATION

## 2021-02-08 NOTE — PROCEDURES
[address]    LUMBAR INTERLAMINAR  NAME:  Yamileth Purcell    MR #:    [id number] :  1940     PHYSICIAN:  Dax Bernal        Operative Report    DATE OF PROCEDURE: 2021   PREOPERATIVE DIAGNOSES: 1.  Spinal stenosis a whole procedure, prior to injection of any medication, aspiration was performed. No blood, fluid, or air was aspirated at anytime.     Aditya Tarango DO, FAAPMR & CAQSM  Physical Medicine and Rehabilitation/Sports Medicine  MEDICAL CENTER [facility]

## 2021-02-12 ENCOUNTER — TELEPHONE (OUTPATIENT)
Dept: NEUROLOGY | Facility: CLINIC | Age: 81
End: 2021-02-12

## 2021-02-12 NOTE — TELEPHONE ENCOUNTER
Called Patient in regards to the L5-S1 interlaminar epidural steroid injection done under fluoroscopic guidance with contrast enhancement that she got on 2/8. Patient states that injection is going good so far. No complaints.   Patient will call back later

## 2021-04-05 ENCOUNTER — APPOINTMENT (OUTPATIENT)
Dept: GENERAL RADIOLOGY | Facility: HOSPITAL | Age: 81
DRG: 690 | End: 2021-04-05
Attending: EMERGENCY MEDICINE
Payer: MEDICARE

## 2021-04-05 ENCOUNTER — HOSPITAL ENCOUNTER (INPATIENT)
Facility: HOSPITAL | Age: 81
LOS: 6 days | Discharge: HOME HEALTH | DRG: 690 | End: 2021-04-11
Attending: EMERGENCY MEDICINE | Admitting: INTERNAL MEDICINE
Payer: MEDICARE

## 2021-04-05 ENCOUNTER — APPOINTMENT (OUTPATIENT)
Dept: GENERAL RADIOLOGY | Facility: HOSPITAL | Age: 81
DRG: 690 | End: 2021-04-05
Attending: INTERNAL MEDICINE
Payer: MEDICARE

## 2021-04-05 DIAGNOSIS — E87.6 HYPOKALEMIA: Primary | ICD-10-CM

## 2021-04-05 DIAGNOSIS — N30.01 ACUTE CYSTITIS WITH HEMATURIA: ICD-10-CM

## 2021-04-05 DIAGNOSIS — A41.9 SEPSIS, DUE TO UNSPECIFIED ORGANISM, UNSPECIFIED WHETHER ACUTE ORGAN DYSFUNCTION PRESENT (HCC): ICD-10-CM

## 2021-04-05 DIAGNOSIS — R50.9 FEVER, UNSPECIFIED FEVER CAUSE: ICD-10-CM

## 2021-04-05 DIAGNOSIS — I49.1 PAC (PREMATURE ATRIAL CONTRACTION): ICD-10-CM

## 2021-04-05 PROCEDURE — 97162 PT EVAL MOD COMPLEX 30 MIN: CPT

## 2021-04-05 PROCEDURE — 71045 X-RAY EXAM CHEST 1 VIEW: CPT | Performed by: EMERGENCY MEDICINE

## 2021-04-05 PROCEDURE — 97530 THERAPEUTIC ACTIVITIES: CPT

## 2021-04-05 PROCEDURE — 97166 OT EVAL MOD COMPLEX 45 MIN: CPT

## 2021-04-05 PROCEDURE — 85025 COMPLETE CBC W/AUTO DIFF WBC: CPT | Performed by: EMERGENCY MEDICINE

## 2021-04-05 PROCEDURE — 82962 GLUCOSE BLOOD TEST: CPT

## 2021-04-05 PROCEDURE — 87077 CULTURE AEROBIC IDENTIFY: CPT | Performed by: EMERGENCY MEDICINE

## 2021-04-05 PROCEDURE — 36415 COLL VENOUS BLD VENIPUNCTURE: CPT

## 2021-04-05 PROCEDURE — 96361 HYDRATE IV INFUSION ADD-ON: CPT

## 2021-04-05 PROCEDURE — 84132 ASSAY OF SERUM POTASSIUM: CPT | Performed by: INTERNAL MEDICINE

## 2021-04-05 PROCEDURE — 93010 ELECTROCARDIOGRAM REPORT: CPT | Performed by: EMERGENCY MEDICINE

## 2021-04-05 PROCEDURE — 80048 BASIC METABOLIC PNL TOTAL CA: CPT | Performed by: EMERGENCY MEDICINE

## 2021-04-05 PROCEDURE — 87493 C DIFF AMPLIFIED PROBE: CPT | Performed by: INTERNAL MEDICINE

## 2021-04-05 PROCEDURE — 80048 BASIC METABOLIC PNL TOTAL CA: CPT | Performed by: INTERNAL MEDICINE

## 2021-04-05 PROCEDURE — 73560 X-RAY EXAM OF KNEE 1 OR 2: CPT | Performed by: INTERNAL MEDICINE

## 2021-04-05 PROCEDURE — 83036 HEMOGLOBIN GLYCOSYLATED A1C: CPT | Performed by: INTERNAL MEDICINE

## 2021-04-05 PROCEDURE — 83605 ASSAY OF LACTIC ACID: CPT | Performed by: EMERGENCY MEDICINE

## 2021-04-05 PROCEDURE — 87086 URINE CULTURE/COLONY COUNT: CPT | Performed by: EMERGENCY MEDICINE

## 2021-04-05 PROCEDURE — 93005 ELECTROCARDIOGRAM TRACING: CPT

## 2021-04-05 PROCEDURE — 99285 EMERGENCY DEPT VISIT HI MDM: CPT

## 2021-04-05 PROCEDURE — 96374 THER/PROPH/DIAG INJ IV PUSH: CPT

## 2021-04-05 PROCEDURE — 87040 BLOOD CULTURE FOR BACTERIA: CPT | Performed by: EMERGENCY MEDICINE

## 2021-04-05 PROCEDURE — 87186 SC STD MICRODIL/AGAR DIL: CPT | Performed by: EMERGENCY MEDICINE

## 2021-04-05 PROCEDURE — 96365 THER/PROPH/DIAG IV INF INIT: CPT

## 2021-04-05 PROCEDURE — 81001 URINALYSIS AUTO W/SCOPE: CPT | Performed by: EMERGENCY MEDICINE

## 2021-04-05 RX ORDER — SODIUM CHLORIDE 9 MG/ML
INJECTION, SOLUTION INTRAVENOUS CONTINUOUS
Status: DISCONTINUED | OUTPATIENT
Start: 2021-04-05 | End: 2021-04-08

## 2021-04-05 RX ORDER — ACETAMINOPHEN 325 MG/1
650 TABLET ORAL EVERY 6 HOURS PRN
Status: DISCONTINUED | OUTPATIENT
Start: 2021-04-05 | End: 2021-04-11

## 2021-04-05 RX ORDER — MECLIZINE HCL 12.5 MG/1
12.5 TABLET ORAL 3 TIMES DAILY PRN
COMMUNITY

## 2021-04-05 RX ORDER — IBUPROFEN 600 MG/1
600 TABLET ORAL ONCE
Status: COMPLETED | OUTPATIENT
Start: 2021-04-05 | End: 2021-04-05

## 2021-04-05 RX ORDER — TRAMADOL HYDROCHLORIDE 50 MG/1
100 TABLET ORAL EVERY 6 HOURS PRN
Status: DISCONTINUED | OUTPATIENT
Start: 2021-04-05 | End: 2021-04-07

## 2021-04-05 RX ORDER — ENOXAPARIN SODIUM 100 MG/ML
30 INJECTION SUBCUTANEOUS DAILY
Status: DISCONTINUED | OUTPATIENT
Start: 2021-04-05 | End: 2021-04-06

## 2021-04-05 RX ORDER — POTASSIUM CHLORIDE 20 MEQ/1
40 TABLET, EXTENDED RELEASE ORAL EVERY 4 HOURS
Status: COMPLETED | OUTPATIENT
Start: 2021-04-05 | End: 2021-04-05

## 2021-04-05 RX ORDER — PREDNISONE 20 MG/1
20 TABLET ORAL
Status: DISCONTINUED | OUTPATIENT
Start: 2021-04-05 | End: 2021-04-07

## 2021-04-05 RX ORDER — ATORVASTATIN CALCIUM 10 MG/1
10 TABLET, FILM COATED ORAL NIGHTLY
Status: DISCONTINUED | OUTPATIENT
Start: 2021-04-05 | End: 2021-04-11

## 2021-04-05 RX ORDER — MECLIZINE HCL 12.5 MG/1
12.5 TABLET ORAL 3 TIMES DAILY PRN
Status: DISCONTINUED | OUTPATIENT
Start: 2021-04-05 | End: 2021-04-11

## 2021-04-05 RX ORDER — DIPHENOXYLATE HYDROCHLORIDE AND ATROPINE SULFATE 2.5; .025 MG/1; MG/1
1 TABLET ORAL 4 TIMES DAILY PRN
Status: DISCONTINUED | OUTPATIENT
Start: 2021-04-05 | End: 2021-04-06

## 2021-04-05 RX ORDER — ATENOLOL 50 MG/1
50 TABLET ORAL DAILY
Status: DISCONTINUED | OUTPATIENT
Start: 2021-04-05 | End: 2021-04-08

## 2021-04-05 RX ORDER — DEXTROSE MONOHYDRATE 25 G/50ML
50 INJECTION, SOLUTION INTRAVENOUS
Status: DISCONTINUED | OUTPATIENT
Start: 2021-04-05 | End: 2021-04-11

## 2021-04-05 RX ORDER — POTASSIUM CHLORIDE 20 MEQ/1
40 TABLET, EXTENDED RELEASE ORAL EVERY 4 HOURS
Status: COMPLETED | OUTPATIENT
Start: 2021-04-05 | End: 2021-04-06

## 2021-04-05 RX ORDER — ONDANSETRON 2 MG/ML
4 INJECTION INTRAMUSCULAR; INTRAVENOUS EVERY 6 HOURS PRN
Status: DISCONTINUED | OUTPATIENT
Start: 2021-04-05 | End: 2021-04-11

## 2021-04-05 RX ORDER — METOCLOPRAMIDE HYDROCHLORIDE 5 MG/ML
5 INJECTION INTRAMUSCULAR; INTRAVENOUS EVERY 8 HOURS PRN
Status: DISCONTINUED | OUTPATIENT
Start: 2021-04-05 | End: 2021-04-11

## 2021-04-05 RX ORDER — HYDROCODONE BITARTRATE AND ACETAMINOPHEN 5; 325 MG/1; MG/1
1 TABLET ORAL EVERY 6 HOURS PRN
Status: DISCONTINUED | OUTPATIENT
Start: 2021-04-05 | End: 2021-04-11

## 2021-04-05 RX ORDER — NITROGLYCERIN 0.4 MG/1
0.4 TABLET SUBLINGUAL EVERY 5 MIN PRN
Status: DISCONTINUED | OUTPATIENT
Start: 2021-04-05 | End: 2021-04-11

## 2021-04-05 RX ORDER — FAMOTIDINE 20 MG/1
40 TABLET ORAL NIGHTLY
Status: DISCONTINUED | OUTPATIENT
Start: 2021-04-05 | End: 2021-04-11

## 2021-04-05 RX ORDER — CYCLOBENZAPRINE HCL 10 MG
10 TABLET ORAL 3 TIMES DAILY PRN
Status: DISCONTINUED | OUTPATIENT
Start: 2021-04-05 | End: 2021-04-11

## 2021-04-05 RX ORDER — SERTRALINE HYDROCHLORIDE 100 MG/1
100 TABLET, FILM COATED ORAL DAILY
Status: DISCONTINUED | OUTPATIENT
Start: 2021-04-05 | End: 2021-04-11

## 2021-04-05 RX ORDER — ALPRAZOLAM 0.25 MG/1
0.25 TABLET ORAL 2 TIMES DAILY PRN
Status: DISCONTINUED | OUTPATIENT
Start: 2021-04-05 | End: 2021-04-11

## 2021-04-05 RX ORDER — ENOXAPARIN SODIUM 100 MG/ML
40 INJECTION SUBCUTANEOUS DAILY
Status: DISCONTINUED | OUTPATIENT
Start: 2021-04-05 | End: 2021-04-05 | Stop reason: DRUGHIGH

## 2021-04-05 NOTE — ED PROVIDER NOTES
Patient Seen in: [facility] AND [facility] Emergency Department      History   Patient presents with:  Pain    Stated Complaint: pain    HPI/Subjective:   HPI    26-year-old female with history of hypertension, diabetes, here with complaints of all over body heather breath. Cardiovascular: Negative for chest pain. Gastrointestinal: Positive for diarrhea and nausea. Negative for abdominal pain. Genitourinary: Negative for dysuria. Musculoskeletal: Positive for arthralgias. Negative for back pain.    Skin: Negat mmol/L    Anion Gap 11 0 - 18 mmol/L    BUN 26 (H) 7 - 18 mg/dL    Creatinine 1.43 (H) 0.55 - 1.02 mg/dL    BUN/CREA Ratio 18.2 10.0 - 20.0    Calcium, Total 6.6 (L) 8.5 - 10.1 mg/dL    Calculated Osmolality 288 275 - 295 mOsm/kg    GFR, Non-African Americ 0.3 %    Immature Granulocyte % 1.5 %   URINALYSIS WITH CULTURE REFLEX    Collection Time: 04/05/21  3:51 AM    Specimen: Urine   Result Value Ref Range    Urine Color Yellow Yellow    Clarity Urine Hazy (A) Clear    Spec Gravity 1.015 1.001 - 1.030    Glu interpreted all the ED vitals  - febrile, hemodynamically stable  - I ordered and personally reviewed the labs and imaging and found bacteriuria, lactic normal, leukocytosis, anemia, CXR clear  - rocephin ordered  - discussed with Dr. Marii Melendez - informed her 10/1/2019 Unknown    Acute cystitis with hematuria N30.01 4/5/2021     Fever, unspecified fever cause R50.9 4/5/2021     Sepsis, due to unspecified organism, unspecified whether acute organ dysfunction present ([facility] [address] A41.9 4/5/2021

## 2021-04-05 NOTE — OCCUPATIONAL THERAPY NOTE
OCCUPATIONAL THERAPY EVALUATION - INPATIENT     Room Number: 439/439-A  Evaluation Date: 4/5/2021  Type of Evaluation: Initial  Presenting Problem:  (hypokalemia)    Physician Order: IP Consult to Occupational Therapy  Reason for Therapy: ADL/IADL Dysfunct training; Endurance training;Patient/Family education;Patient/Family training;Equipment eval/education; Compensatory technique education       OCCUPATIONAL THERAPY MEDICAL/SOCIAL HISTORY     Problem List  Principal Problem:    Hypokalemia  Active Problems: ASSESSMENT  Rating: Unable to rate  Location: L knee, posterior neck  Management Techniques: Relaxation;Repositioning    ACTIVITY TOLERANCE  BP: 126/66  BP Location: Right arm  BP Method: Automatic  Patient Position: Sitting    COGNITION  WFL    RANGE OF M standing for 2-3 minutes in prep for adls with sba   Comment:    Patient will complete item retrieval with sba  Comment:          Goals  on:   Frequency: 3-5x week    Vinay ROSALES/L  [facility]   #[phone]

## 2021-04-05 NOTE — H&P
[address] [facility] Patient Status:  Inpatient    1940 MRN [id number]   Location [facility] 4W/SW/SE Attending Marito Han MD   Hosp Day # 0 PCP Arlette Thurman MD     Date:  2021  Date o Oral Tab, Take 1 tablet by mouth 4 (four) times daily as needed for Diarrhea.  traMADol HCl 50 MG Oral Tab, Take 2 tablets (100 mg total) by mouth every 6 (six) hours as needed.   ALPRAZolam 0.25 MG Oral Tab, TAKE 1 TABLET BY MOUTH TWICE DAILY AS NEEDED  fa and warmth without edema of left knee. Decreased ROM in flexion and extension due to pain. No pain elicited with internal or external rotation of left hip. Integument: Warm, dry, no rash  Psychiatric: Appropriate mood and affect.       Results:     Lab and infection, but if no significant arthritis, would consider PO steroid. Anemia  -Appears to be borderline normocytic at present, but macrocytic on previous outpatient labs  -Baseline hemoglobin was 8.5 from 12/2020.     Diet: General diet  DVT proph

## 2021-04-05 NOTE — ED QUICK NOTES
Patient cleaned up and purwick placed for incontinent care at this time.  Patient c/o left knee pain and swelling denies trauma will notify MD.

## 2021-04-05 NOTE — ED QUICK NOTES
Orders for admission, patient is aware of plan and ready to go upstairs. Any questions, please call ED TOAN Walden  at extension [phone].    Type of COVID test sent:rapid  COVID Suspicion level: Low    Titratable drug(s) infusing:none  Rate:    LOC at time of

## 2021-04-05 NOTE — PHYSICAL THERAPY NOTE
PHYSICAL THERAPY EVALUATION - INPATIENT     Room Number: 439/439-A  Evaluation Date: 4/5/2021  Type of Evaluation: Initial   Physician Order: PT Eval and Treat    Presenting Problem: neck, back & L knee pain; fall at home 2 weeks ago; difficulty ambulatin Form.  Research supports that patients with this level of impairment may benefit from sub-acute rehab. Patient will benefit from continued IP PT services to address these deficits in preparation for discharge.     DISCHARGE RECOMMENDATIONS  PT Discharge 6/16/15    • CAUDAL EPIDURAL STEROID INJECTION UNDER FLUOROSCOPY N/A 2/17/2020    Performed by Jocy George DO at [address] [place] [address] MAIN OR   • CHOLECYSTECTOMY     • FLEXIBLE SIGMOIDOSCOPY N/A 8/14/2019    Performed by Marie Tolbert MD at [address] [place] [address] ENDOSCOPY   • HERNIA S from a chair with arms (e.g., wheelchair, bedside commode, etc.): A Lot   -   Moving from lying on back to sitting on the side of the bed?: A Little   How much help from another person does the patient currently need. ..   -   Moving to and from a bed to a

## 2021-04-05 NOTE — PLAN OF CARE
Patient stable, vital signs stable. Patient reports that she has back/left knee pain 8/10 when she moves-- no pain when she is not moving. Patient was up independently with walker and presented to ER overnight with weakness/trouble ambulating.  Physical/occ transferring and ambulating w/ or w/o assistive devices  - Assist with transfers and ambulation using safe patient handling equipment as needed  - Ensure adequate protection for wounds/incisions during mobilization  - Obtain PT/OT consults as needed  - Adv cognitive and physical deficits and behaviors that affect risk of falls.   - [place] fall precautions as indicated by assessment.  - Educate pt/family on patient safety including physical limitations  - Instruct pt to call for assistance with activity bas

## 2021-04-05 NOTE — ED INITIAL ASSESSMENT (HPI)
Patient arrives via ems from home with c/o head, neck and back pain along with pain to knees with difficulty walking. Patient states pain has been worse over the last couple days. patient states she took tramadol just prior to ems call.  Patient also complai

## 2021-04-05 NOTE — CM/SW NOTE
Per rounds this AM & PT/OT familiaals, recommend DEMI placement. Pt minimally participated w/PT/OT & declined to stand/walk due to pain. Per chart review, pt lives alone. She is A&O. She ambulates w/o DME but recently started using her RW at home.   She dri

## 2021-04-05 NOTE — PLAN OF CARE
Problem: Patient Centered Care  Goal: Patient preferences are identified and integrated in the patient's plan of care  Description: Interventions:  - What would you like us to know as we care for you? I live alone in a 2 bedroom condo.    - Provide timely mobility/gait  Description: Interventions:  - Assess patient's functional ability and stability  - Promote increasing activity/tolerance for mobility and gait  - Educate and engage patient/family in tolerated activity level and precautions    Outcome: Prog transportation as appropriate  - Identify discharge learning needs (meds, wound care, etc)  - Arrange for interpreters to assist at discharge as needed  - Consider post-discharge preferences of patient/family/discharge partner  - Complete POLST form as vazquez

## 2021-04-06 PROCEDURE — 80048 BASIC METABOLIC PNL TOTAL CA: CPT | Performed by: INTERNAL MEDICINE

## 2021-04-06 PROCEDURE — 86920 COMPATIBILITY TEST SPIN: CPT

## 2021-04-06 PROCEDURE — 86901 BLOOD TYPING SEROLOGIC RH(D): CPT | Performed by: INTERNAL MEDICINE

## 2021-04-06 PROCEDURE — 30233N1 TRANSFUSION OF NONAUTOLOGOUS RED BLOOD CELLS INTO PERIPHERAL VEIN, PERCUTANEOUS APPROACH: ICD-10-PCS | Performed by: INTERNAL MEDICINE

## 2021-04-06 PROCEDURE — 86850 RBC ANTIBODY SCREEN: CPT | Performed by: INTERNAL MEDICINE

## 2021-04-06 PROCEDURE — 82962 GLUCOSE BLOOD TEST: CPT

## 2021-04-06 PROCEDURE — 85025 COMPLETE CBC W/AUTO DIFF WBC: CPT | Performed by: INTERNAL MEDICINE

## 2021-04-06 PROCEDURE — 85014 HEMATOCRIT: CPT | Performed by: INTERNAL MEDICINE

## 2021-04-06 PROCEDURE — 85018 HEMOGLOBIN: CPT | Performed by: INTERNAL MEDICINE

## 2021-04-06 PROCEDURE — 82272 OCCULT BLD FECES 1-3 TESTS: CPT | Performed by: INTERNAL MEDICINE

## 2021-04-06 PROCEDURE — 86900 BLOOD TYPING SEROLOGIC ABO: CPT | Performed by: INTERNAL MEDICINE

## 2021-04-06 PROCEDURE — 51701 INSERT BLADDER CATHETER: CPT

## 2021-04-06 PROCEDURE — 36430 TRANSFUSION BLD/BLD COMPNT: CPT

## 2021-04-06 PROCEDURE — 84132 ASSAY OF SERUM POTASSIUM: CPT | Performed by: INTERNAL MEDICINE

## 2021-04-06 RX ORDER — LOPERAMIDE HYDROCHLORIDE 2 MG/1
4 CAPSULE ORAL 4 TIMES DAILY PRN
Status: DISCONTINUED | OUTPATIENT
Start: 2021-04-06 | End: 2021-04-11

## 2021-04-06 RX ORDER — POTASSIUM CHLORIDE 1.5 G/1.77G
40 POWDER, FOR SOLUTION ORAL ONCE
Status: COMPLETED | OUTPATIENT
Start: 2021-04-06 | End: 2021-04-06

## 2021-04-06 RX ORDER — SODIUM CHLORIDE 9 MG/ML
INJECTION, SOLUTION INTRAVENOUS ONCE
Status: COMPLETED | OUTPATIENT
Start: 2021-04-06 | End: 2021-04-06

## 2021-04-06 NOTE — PLAN OF CARE
Alyce Cushing unable to urinate again this evening, bladder scan at approximately 2100 showed 425cc. Straight cathed, only removed 300cc urine per orders. Check void again by 5am. Per order, insert jaeger cath if requires 3 straight caths. Patient remains AOx4.  Sushila Addisones PT/OT consults as needed  - Advance activity as appropriate  - Communicate ordered activity level and limitations with patient/family  4/6/2021 0123 by Lida Gillespie RN  Outcome: Progressing  4/6/2021 0123 by Lida Gillespie, TOAN  Outcome: Progressing  G RN  Outcome: Progressing     Problem: SAFETY ADULT - FALL  Goal: Free from fall injury  Description: INTERVENTIONS:  - Assess pt frequently for physical needs  - Identify cognitive and physical deficits and behaviors that affect risk of falls.   - [place] as needed  - Follow urinary retention protocol/standard of care  - Consider collaborating with pharmacy to review patient's medication profile  - Implement strategies to promote bladder emptying  Outcome: Not Progressing

## 2021-04-06 NOTE — PROGRESS NOTES
[facility]    Progress Note    [place] Jean Baptistens Patient Status:  Inpatient    1940 MRN [id number]   Location [facility] 4W/SW/SE Attending Claudia Alexander MD   Hosp Day # 1 PCP Molly Rhodes MD       Subjective:   Mich Lei non-tender; bowel sounds normal; no palpable masses,  no organomegaly  Extremities: atraumatic, no cyanosis or edema. Left knee warmer than the right and showing swelling. Able to extend to 180 degrees but flex only to 15 degrees with severe pain.   Neuro and Plan:     Sepsis secondary to UTI  -Blood culture pending. Urine cultures positive for gram-negative rods greater than 100,000. ID to follow.  -Continue ceftriaxone  -Awaiting a.m. labs.   White count was elevated to 12.9 yesterday and I suspect it wi

## 2021-04-06 NOTE — PHYSICAL THERAPY NOTE
Pt was to be seen for PT treatment session. HGB fell to 6.8 this morning from 9.5 yesterday. Will re-attempt tomorrow if appropriate to see pt.

## 2021-04-06 NOTE — CM/SW NOTE
SW spoke w/pt regarding discharge planning. Pt reported that she still has a lot of pain and she is still waiting for more tests and answers.   She said multiple times that she is not ready to leave the hospital.    SW provided reassurance that Divine michelle

## 2021-04-07 PROCEDURE — 97530 THERAPEUTIC ACTIVITIES: CPT

## 2021-04-07 PROCEDURE — 87205 SMEAR GRAM STAIN: CPT | Performed by: ORTHOPAEDIC SURGERY

## 2021-04-07 PROCEDURE — 85014 HEMATOCRIT: CPT | Performed by: INTERNAL MEDICINE

## 2021-04-07 PROCEDURE — 82945 GLUCOSE OTHER FLUID: CPT | Performed by: ORTHOPAEDIC SURGERY

## 2021-04-07 PROCEDURE — 82728 ASSAY OF FERRITIN: CPT | Performed by: INTERNAL MEDICINE

## 2021-04-07 PROCEDURE — 87206 SMEAR FLUORESCENT/ACID STAI: CPT | Performed by: ORTHOPAEDIC SURGERY

## 2021-04-07 PROCEDURE — 97116 GAIT TRAINING THERAPY: CPT

## 2021-04-07 PROCEDURE — 89051 BODY FLUID CELL COUNT: CPT | Performed by: ORTHOPAEDIC SURGERY

## 2021-04-07 PROCEDURE — 80048 BASIC METABOLIC PNL TOTAL CA: CPT | Performed by: INTERNAL MEDICINE

## 2021-04-07 PROCEDURE — 83540 ASSAY OF IRON: CPT | Performed by: INTERNAL MEDICINE

## 2021-04-07 PROCEDURE — 85018 HEMOGLOBIN: CPT | Performed by: INTERNAL MEDICINE

## 2021-04-07 PROCEDURE — 87075 CULTR BACTERIA EXCEPT BLOOD: CPT | Performed by: ORTHOPAEDIC SURGERY

## 2021-04-07 PROCEDURE — 93005 ELECTROCARDIOGRAM TRACING: CPT

## 2021-04-07 PROCEDURE — 93010 ELECTROCARDIOGRAM REPORT: CPT | Performed by: INTERNAL MEDICINE

## 2021-04-07 PROCEDURE — 84466 ASSAY OF TRANSFERRIN: CPT | Performed by: INTERNAL MEDICINE

## 2021-04-07 PROCEDURE — 89060 EXAM SYNOVIAL FLUID CRYSTALS: CPT | Performed by: ORTHOPAEDIC SURGERY

## 2021-04-07 PROCEDURE — 84132 ASSAY OF SERUM POTASSIUM: CPT | Performed by: INTERNAL MEDICINE

## 2021-04-07 PROCEDURE — 87070 CULTURE OTHR SPECIMN AEROBIC: CPT | Performed by: ORTHOPAEDIC SURGERY

## 2021-04-07 PROCEDURE — 89050 BODY FLUID CELL COUNT: CPT | Performed by: ORTHOPAEDIC SURGERY

## 2021-04-07 PROCEDURE — 97535 SELF CARE MNGMENT TRAINING: CPT

## 2021-04-07 PROCEDURE — 87102 FUNGUS ISOLATION CULTURE: CPT | Performed by: ORTHOPAEDIC SURGERY

## 2021-04-07 PROCEDURE — 82962 GLUCOSE BLOOD TEST: CPT

## 2021-04-07 RX ORDER — TRAMADOL HYDROCHLORIDE 50 MG/1
100 TABLET ORAL EVERY 12 HOURS PRN
Status: DISCONTINUED | OUTPATIENT
Start: 2021-04-07 | End: 2021-04-11

## 2021-04-07 RX ORDER — BUPIVACAINE HYDROCHLORIDE 2.5 MG/ML
15 INJECTION, SOLUTION EPIDURAL; INFILTRATION; INTRACAUDAL ONCE
Status: DISCONTINUED | OUTPATIENT
Start: 2021-04-07 | End: 2021-04-11

## 2021-04-07 RX ORDER — METHYLPREDNISOLONE ACETATE 80 MG/ML
80 INJECTION, SUSPENSION INTRA-ARTICULAR; INTRALESIONAL; INTRAMUSCULAR; SOFT TISSUE ONCE
Status: DISCONTINUED | OUTPATIENT
Start: 2021-04-07 | End: 2021-04-11

## 2021-04-07 RX ORDER — POTASSIUM CHLORIDE 20 MEQ/1
40 TABLET, EXTENDED RELEASE ORAL ONCE
Status: COMPLETED | OUTPATIENT
Start: 2021-04-07 | End: 2021-04-07

## 2021-04-07 NOTE — PLAN OF CARE
A&Ox4, room air, VSS. Remote tele in place with no calls. Tolerating general diet. Accu-checks AC/HS. Denies n/v. +BM. +gas. Voiding. Rocephin as ordered. Preventative Mepilex to bilateral heels. Protective barrier applied to bilateral buttocks - redness. patient/family  Outcome: Progressing  Goal: Maintain proper alignment of affected body part  Description: INTERVENTIONS:  - Support and protect limb and body alignment per provider's orders  - Instruct and reinforce with patient and family use of appropria injury  Description: INTERVENTIONS:  - Assess pt frequently for physical needs  - Identify cognitive and physical deficits and behaviors that affect risk of falls.   - [place] fall precautions as indicated by assessment.  - Educate pt/family on patient sa

## 2021-04-07 NOTE — PHYSICAL THERAPY NOTE
Chart reviewed. Attempted physical therapy treatment. Per chart and RN Otoniel Cassette, patient to have bedside knee aspiration and steroid injection. Will defer physical therapy until after procedure for optimal patient participation. RN in agreement.

## 2021-04-07 NOTE — OCCUPATIONAL THERAPY NOTE
OCCUPATIONAL THERAPY TREATMENT NOTE - INPATIENT        Room Number: 439/439-A           Presenting Problem:  (Hypokalemia; L knee aspiration 04/07)    Problem List  Principal Problem:    Hypokalemia  Active Problems:    Fever, unspecified fever cause    Ac techniques;ADL training;Functional transfer training;UE strengthening/ROM; Endurance training;Patient/Family education;Patient/Family training;Equipment eval/education; Compensatory technique education    SUBJECTIVE  \"I had that long needle injected in me t addressed; Alarm set    OT Goals:  Patients self stated goal is: does not state       Patient will complete functional transfer with sba  Comment: Progressing-min. A for sit to stands     Patient will complete toileting with sba  Comment: Progressing-min.  A

## 2021-04-07 NOTE — PROGRESS NOTES
[facility] Pharmacy Note:  Renal Dose Adjustment for Tramadol Ora Priscila)    Jesus Bai has been prescribed Tramadol (ULTRAM) 100 mg orally every 6 hours as needed for pain. Estimated Creatinine Clearance: 25.6 mL/min (A) (based on SCr of 1.26 mg/dL (H)).

## 2021-04-07 NOTE — PROGRESS NOTES
[facility]  Gastroenterology Progress Note    Celestine Dillard Patient Status:  Inpatient    1940 MRN [id number]   Location [facility] 4W/SW/SE Attending Angélica Lucia MD   Hosp Day # 2 PCP Elie Prater MD     Subjective:  Jen Cortez dislocation. 2. Stable mild-to-moderate left knee joint arthritis and chondrocalcinosis suggesting CPPD arthropathy. 3. Large left knee joint effusion is unchanged.     Dictated by (CST): Emily Marie MD on 4/05/2021 at 9:36 AM     Finalized by (CST): cystitis with hematuria     Sepsis, due to unspecified organism, unspecified whether acute organ dysfunction present [facility])      Assessment/Plan:  1. History of rectal cancer s/p robotic TAMIS in 2019 at [place]  -no scope since then.      2.  Anemia - likel

## 2021-04-07 NOTE — CM/SW NOTE
SW follow up:  Per pt choice, HH referral to [facility] placed via Aidin. Per [facility] Elizabeth, [facility] able to staff. SW initiated F2F, need MD signature. Plan: Return home w/RHH vs DEMI placement depending on pt progress/choice. SW/CM will continue to follow.     Ernesto Shrestha

## 2021-04-07 NOTE — PHYSICAL THERAPY NOTE
PHYSICAL THERAPY TREATMENT NOTE - INPATIENT     Room Number: 439/439-A       Presenting Problem: Neck/back/knee pain, falls, difficulty ambulating, knee aspiration and steroid injection on 4/7    Problem List  Principal Problem:    Hypokalemia  Active Prob post session.     DISCHARGE RECOMMENDATIONS  PT Discharge Recommendations: 24 hour care/supervision;Home with home health PT;Sub-acute rehabilitation     PLAN  PT Treatment Plan: Bed mobility;Transfer training;Gait training;Family education;Patient educatio 3-4 steps  Assistive Device: Rolling walker  Pattern: L Steppage;R Steppage; Shuffle;L Decreased stance time  Stoop/Curb Assistance: Not tested       Patient End of Session: Up in chair;Needs met;Call light within reach;RN aware of session/findings; All radha

## 2021-04-07 NOTE — PLAN OF CARE
Problem: Patient Centered Care  Goal: Patient preferences are identified and integrated in the patient's plan of care  Description: Interventions:  - Provide timely, complete, and accurate information to patient/family  - Incorporate patient and family k Promote increasing activity/tolerance for mobility and gait  - Educate and engage patient/family in tolerated activity level and precautions  - When transferring patient, block weaker knee for safety  Outcome: Progressing     Problem: PAIN - ADULT  Goal: V discharge learning needs (meds, wound care, etc)  - Arrange for interpreters to assist at discharge as needed  - Consider post-discharge preferences of patient/family/discharge partner  - Complete POLST form as appropriate  - Assess patient's ability to be

## 2021-04-07 NOTE — CONSULTS
[facility]    PATIENT'S NAME: Stoney Underwood   ATTENDING PHYSICIAN: Luba Last MD   CONSULTING PHYSICIAN: Rebekah Lee MD   PATIENT ACCOUNT#:   [id number]    LOCATION:  [address] [place] [address] #:   [id number]       DATE OF history of any redness. 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 There has been some mild clinical improvement since she has been in the hospital.  There has been no history of any identifiable trauma.       RECOMMENDATIONS:  We discussed all the potential b

## 2021-04-07 NOTE — PLAN OF CARE
OB stool positive- GI now on consult. I unit blood given with improvement in hgb- 7.5. Norco for pain. Ortho to see tomorrow.    Problem: Patient Centered Care  Goal: Patient preferences are identified and integrated in the patient's plan of care  [address] [facility] [address] Problem: Impaired Functional Mobility  Goal: Achieve highest/safest level of mobility/gait  Description: Interventions:  - Assess patient's functional ability and stability  - Promote increasing activity/tolerance for mobility and gait  - Educate and eng environment to reduce risk of injury  - Provide assistive devices as appropriate  - Consider OT/PT consult to assist with strengthening/mobility  - Encourage toileting schedule  Outcome: Progressing     Problem: DISCHARGE PLANNING  Goal: Discharge to home

## 2021-04-07 NOTE — CONSULTS
[facility]    Report of Consultation    Che Chester Patient Status:  Inpatient    1940 MRN [id number]   Location [facility] 4W/SW/SE Attending Quinton Causey MD   Hosp Day # 1 PCP Sedrick Rockwell MD     Date of Admission: Problem Relation Age of Onset   • Cancer Sister        Social History  Patient Guardians:  Not on file    Other Topics            Concern  Caffeine Concern        No    Social History Narrative    The patient does use an assistive device. [place] [employer]    The (NORCO) 5-325 MG per tab 1 tablet, 1 tablet, Oral, Q6H PRN      Meclizine HCl 12.5 MG Oral Tab, Take 12.5 mg by mouth 3 (three) times daily as needed.   HYDROcodone-acetaminophen 5-325 MG Oral Tab, Take 1 tablet by mouth every 8 (eight) hours as needed for 18, height 5' (1.524 m), weight 134 lb (60.8 kg), SpO2 95 %, not currently breastfeeding.   GENERAL: well developed, in no apparent distress  SKIN: no rashes,no suspicious lesions  HEENT: atraumatic, normocephalic, oropharynx clear  NECK: supple,no adenopat Scoliosis. 6. Osteoarthritis. 7. Status post cholecystectomy. 8. A preliminary report was submitted and there is agreement without major discrepancies. Dictated by (CST): Caleb Linares MD on 4/05/2021 at 7:51 AM     Finalized by (CST):  Kristi Marie

## 2021-04-08 PROCEDURE — 80048 BASIC METABOLIC PNL TOTAL CA: CPT | Performed by: INTERNAL MEDICINE

## 2021-04-08 PROCEDURE — 82962 GLUCOSE BLOOD TEST: CPT

## 2021-04-08 PROCEDURE — 97530 THERAPEUTIC ACTIVITIES: CPT

## 2021-04-08 PROCEDURE — 85018 HEMOGLOBIN: CPT | Performed by: INTERNAL MEDICINE

## 2021-04-08 PROCEDURE — 85014 HEMATOCRIT: CPT | Performed by: INTERNAL MEDICINE

## 2021-04-08 PROCEDURE — 97116 GAIT TRAINING THERAPY: CPT

## 2021-04-08 PROCEDURE — 85025 COMPLETE CBC W/AUTO DIFF WBC: CPT | Performed by: INTERNAL MEDICINE

## 2021-04-08 PROCEDURE — 99222 1ST HOSP IP/OBS MODERATE 55: CPT | Performed by: INTERNAL MEDICINE

## 2021-04-08 PROCEDURE — 97535 SELF CARE MNGMENT TRAINING: CPT

## 2021-04-08 RX ORDER — ATENOLOL 50 MG/1
50 TABLET ORAL 2 TIMES DAILY
Status: DISCONTINUED | OUTPATIENT
Start: 2021-04-08 | End: 2021-04-11

## 2021-04-08 RX ORDER — MELATONIN
325 2 TIMES DAILY WITH MEALS
Status: DISCONTINUED | OUTPATIENT
Start: 2021-04-08 | End: 2021-04-11

## 2021-04-08 NOTE — SPIRITUAL CARE NOTE
Pt said she felt better and is hopeful for her recovery. She shared her fear of becoming a burden to her family, was emotional and tearful. She has two sons who live far away and willing to support her in various ways.  Pt is active in [address] [place] [address] and is jacques

## 2021-04-08 NOTE — PLAN OF CARE
Patient stable, vital signs stable. Patient up with rolling walker, working with physical therapy. Patient tolerated general diet well.  Patient with mild pain to neck (lidocaine patch applied), back and left knee; patient reports pain much better than when mobilization  - Obtain PT/OT consults as needed  - Advance activity as appropriate  - Communicate ordered activity level and limitations with patient/family  Outcome: Progressing  Goal: Maintain proper alignment of affected body part  Description: INTERVEN adequate comfort level or patient's stated pain goal  Description: INTERVENTIONS:  - Encourage pt to monitor pain and request assistance  - Assess pain using appropriate pain scale  - Administer analgesics based on type and severity of pain and evaluate re their own health  - Refer to Case Management Department for coordinating discharge planning if the patient needs post-hospital services based on physician/LIP order or complex needs related to functional status, cognitive ability or social support system

## 2021-04-08 NOTE — CONSULTS
[facility]    Cardiology Consultation    Rafa Saucedo Patient Status:  Inpatient    1940 MRN [id number]   Location [facility] 4W/SW/SE Attending Peter Wade MD   [facility] Day # 3 PCP Lilliana Stark MD     2021  Reason f HYSTERECTOMY     • KNEE REPLACEMENT SURGERY Right      Family History   Problem Relation Age of Onset   • Cancer Sister       reports that she has quit smoking. She has never used smokeless tobacco. She reports current alcohol use.  She reports that she knox 50 mL, Intravenous, Q15 Min PRN **OR** glucose (DEX4) oral liquid 30 g, 30 g, Oral, Q15 Min PRN **OR** Glucose-Vitamin C (DEX-4) chewable tab 8 tablet, 8 tablet, Oral, Q15 Min PRN  •  Insulin Aspart Pen (NOVOLOG) 100 UNIT/ML flexpen 1-5 Units, 1-5 Units, S .0 04/08/2021    CREATSERUM 0.98 04/08/2021    BUN 22 04/08/2021     04/08/2021    K 3.7 04/08/2021     04/08/2021    CO2 16.0 04/08/2021     04/08/2021    CA 6.6 04/08/2021    PGLU 169 04/08/2021       Imaging:  No results found. reviewed, appears that patient has intermittent atrial fibrillation with elevated rates. I suspect she has had this as an outpatient and it was exacerbated in the setting of active infection. Currently on atenolol 50 mg daily, increase to twice daily.   P

## 2021-04-08 NOTE — CM/SW NOTE
Patient discussed in rounds, plan for discharge today. Per Sean Dunn RN, patient is refusing SNF. Patient is accepted by [facility], F2F entered. Per [facility] liaison, patient requesting assistance at home.  Referral made to [facility] for LEONA ARREDONDO

## 2021-04-08 NOTE — PLAN OF CARE
Patient up to bathroom with contact guard assist and rolling walker this shift. Remote tele in place. Bedtime blood sugar 264. Mepilex to heels for prophylaxis and heels elevated off bed. Monitoring Hgb. PT recommending Rehab once medically cleared. and body alignment per provider's orders  - Instruct and reinforce with patient and family use of appropriate assistive device and precautions (e.g. spinal or hip dislocation precautions)  Outcome: Progressing     Problem: Impaired Functional Mobility  [facility] DISCHARGE PLANNING  Goal: Discharge to home or other facility with appropriate resources  Description: INTERVENTIONS:  - Identify barriers to discharge w/pt and caregiver  - Include patient/family/discharge partner in discharge planning  - Arrange for need

## 2021-04-08 NOTE — OCCUPATIONAL THERAPY NOTE
OCCUPATIONAL THERAPY TREATMENT NOTE - INPATIENT    Room Number: 439/439-A         Presenting Problem:  (Hypokalemia; L knee aspiration 04/07)     Problem List  Principal Problem:    Hypokalemia  Active Problems:    Fever, unspecified fever cause    Acute c transfer training; Endurance training    SUBJECTIVE  Pt seen up in bed and agreeable for OT session.      OBJECTIVE  Precautions: Bed/chair alarm    WEIGHT BEARING RESTRICTION  Weight Bearing Restriction: None                PAIN ASSESSMENT  Ratin  Locat met;Call light within reach;RN aware of session/findings; Alarm set    OT Goals:    Patient will complete functional transfer with sba  Comment: Progressing-CG A for sit to stands     Patient will complete toileting with sba  Comment: Progressing-min.  A

## 2021-04-08 NOTE — PROGRESS NOTES
[facility] AND [facility]  Gastroenterology Progress Note    Che Chester Patient Status:  Inpatient    1940 MRN [id number]   Location [facility] 4W/SW/SE Attending Quinton Causey MD   Hosp Day # 3 PCP Sedrick Rockwell MD     Subjective:  Isael Dos Santos hemorrhage, unspecified gastrointestinal hemorrhage type     Anemia, unspecified type     Hypokalemia     Hypomagnesemia     Diarrhea due to malabsorption     Diarrhea     Diarrhea, unspecified type     Weakness     Chemotherapy induced diarrhea     Pernic

## 2021-04-08 NOTE — PROGRESS NOTES
[facility]    Progress Note    Malickgeetha Hassan Patient Status:  Inpatient    1940 MRN [id number]   Location [facility] 4W/SW/SE Attending Hari Guerrier MD   Hosp Day # 2 PCP Fabiola Minor MD       Subjective:   Hiren Burgess 04/07/2021    .0 04/06/2021    CREATSERUM 1.01 04/07/2021    BUN 24 (H) 04/07/2021     04/07/2021    K 3.4 (L) 04/07/2021    K 3.4 (L) 04/07/2021     (H) 04/07/2021    CO2 18.0 (L) 04/07/2021     (H) 04/07/2021    CA 6.7 (L) 04/07

## 2021-04-08 NOTE — PHYSICAL THERAPY NOTE
PHYSICAL THERAPY TREATMENT NOTE - INPATIENT     Room Number: 439/439-A       Presenting Problem: Neck/back/knee pain, falls, difficulty ambulating, knee aspiration and steroid injection on 4/7    Problem List  Principal Problem:    Hypokalemia  Active Prob mechanics; Endurance; Energy conservation;Patient education;Gait training;Range of motion;Strengthening;Stoop training;Stair training;Transfer training;Balance training    SUBJECTIVE  Patient was agreeable to treatment session.  \"I get dizzy when my head goe Session: Up in chair;Needs met;Call light within reach;RN aware of session/findings; All patient questions and concerns addressed    CURRENT GOALS     Goals to be met by: 4/19/21  Patient Goal Patient's self-stated goal is: to go home   Goal #1 Patient is a

## 2021-04-08 NOTE — PROGRESS NOTES
[facility]    Progress Note    Summer Escobar Patient Status:  Inpatient    1940 MRN [id number]   Location [facility] 4W/SW/SE Attending Mikaela Herron MD   Hosp Day # 3 PCP Britta Brooks MD       Subjective:   Burnice Elizabethsin is somewhat anxious appearing especially in regard to her health    Results:     Lab Results   Component Value Date    WBC 8.5 04/08/2021    HGB 7.3 (L) 04/08/2021    HCT 21.9 (L) 04/08/2021    .0 04/08/2021    CREATSERUM 0.98 04/08/2021    BUN 22 (

## 2021-04-09 ENCOUNTER — APPOINTMENT (OUTPATIENT)
Dept: CV DIAGNOSTICS | Facility: HOSPITAL | Age: 81
DRG: 690 | End: 2021-04-09
Attending: INTERNAL MEDICINE
Payer: MEDICARE

## 2021-04-09 PROCEDURE — 85014 HEMATOCRIT: CPT | Performed by: INTERNAL MEDICINE

## 2021-04-09 PROCEDURE — 93306 TTE W/DOPPLER COMPLETE: CPT | Performed by: INTERNAL MEDICINE

## 2021-04-09 PROCEDURE — 97116 GAIT TRAINING THERAPY: CPT

## 2021-04-09 PROCEDURE — 85018 HEMOGLOBIN: CPT | Performed by: INTERNAL MEDICINE

## 2021-04-09 PROCEDURE — 99232 SBSQ HOSP IP/OBS MODERATE 35: CPT | Performed by: INTERNAL MEDICINE

## 2021-04-09 PROCEDURE — 82962 GLUCOSE BLOOD TEST: CPT

## 2021-04-09 NOTE — PLAN OF CARE
Pt a/o x 4. On room air. On remote tele. PRN norco given for pain. Carb controlled diet - tolerated. Accu-checks ACHS  Saline locked - Ancef   Will continue to monitor. BLP.  Call light in reach    Problem: Patient Centered Care  Goal: Patient prefer precautions (e.g. spinal or hip dislocation precautions)  Outcome: Progressing     Problem: Impaired Functional Mobility  Goal: Achieve highest/safest level of mobility/gait  Description: Interventions:  - Assess patient's functional ability and stability barriers to discharge w/pt and caregiver  - Include patient/family/discharge partner in discharge planning  - Arrange for needed discharge resources and transportation as appropriate  - Identify discharge learning needs (meds, wound care, etc)  - Arrange f

## 2021-04-09 NOTE — PLAN OF CARE
Patient stable, vital signs stable. Patient went for echocardiogram this morning. Patient tolerating diet well. Patient with low hemoglobin, venofer ordered per Dr. Linder Grannis .  Patient had incontinent episode of loose stool, small amount of blood present upo part  Description: INTERVENTIONS:  - Support and protect limb and body alignment per provider's orders  - Instruct and reinforce with patient and family use of appropriate assistive device and precautions (e.g. spinal or hip dislocation precautions)  Ledy Espinoza and physical deficits and behaviors that affect risk of falls.   - Amelia fall precautions as indicated by assessment.  - Educate pt/family on patient safety including physical limitations  - Instruct pt to call for assistance with activity based on asse

## 2021-04-09 NOTE — PHYSICAL THERAPY NOTE
PHYSICAL THERAPY TREATMENT NOTE - INPATIENT     Room Number: 439/439-A       Presenting Problem: Neck/back/knee pain, falls, difficulty ambulating, knee aspiration and steroid injection on 4/7    Problem List  Principal Problem:    Hypokalemia  Active Prob mechanics; Endurance; Energy conservation;Patient education;Gait training;Range of motion;Strengthening;Stoop training;Stair training;Transfer training;Balance training    SUBJECTIVE  \"They found a hole in my heart so I don't know if they can fix it by [employer] in chair;Needs met;Call light within reach;RN aware of session/findings; Alarm set    CURRENT GOALS   Goals to be met by: 4/19/21  Patient Goal Patient's self-stated goal is: to go home   Goal #1 Patient is able to demonstrate supine - sit EOB @ level: moshe

## 2021-04-09 NOTE — PROGRESS NOTES
[facility] HOSP - [facility]    Cardiology Progress Note    Rafa Saucedo Patient Status:  Inpatient    1940 MRN [id number]   Location [facility] 4W/SW/SE Attending Peter Wade MD   [facility] Day # 4 PCP Lilliana Stark MD     2021  Subject 2. 9* 3.2*  3.2* 3.4*  3.4* 3.7     --  105  --  114* 117* 118*   CO2 24.0  --  22.0  --  19.0* 18.0* 16.0*   BUN 26*  --  27*  --  27* 24* 22*   CREATSERUM 1.43*  --  1.53*  --  1.26* 1.01 0.98   CA 6.6*  --  6.2*  --  6.4* 6.7* 6.6*   *  -- Intravenous, Q8H PRN  glucose (DEX4) oral liquid 15 g, 15 g, Oral, Q15 Min PRN   Or  Glucose-Vitamin C (DEX-4) chewable tab 4 tablet, 4 tablet, Oral, Q15 Min PRN   Or  dextrose 50 % injection 50 mL, 50 mL, Intravenous, Q15 Min PRN   Or  glucose (DEX4) oral strips reviewed, appears that patient has intermittent atrial fibrillation with elevated rates. I suspect she has had this as an outpatient and it was exacerbated in the setting of active infection.   Cont atenolol 50mg po bid rates are better now  [employer]

## 2021-04-09 NOTE — PROGRESS NOTES
[facility]    Progress Note    Sejal Lombardi Patient Status:  Inpatient    1940 MRN [id number]   Location [facility] 4W/SW/SE Attending Ha [place] MD   Hosp Day # 4 PCP Dodie Stephenson MD     Date of Admission:  2021 PRN  [COMPLETED] sodium chloride 0.9% IV bolus 500 mL, 500 mL, Intravenous, Once  [COMPLETED] 0.9% NaCl infusion, , Intravenous, Once  [COMPLETED] potassium chloride (KLOR-CON) powder packet 40 mEq, 40 mEq, Oral, Once  [COMPLETED] ibuprofen (MOTRIN) tab 60 Q6H PRN  [COMPLETED] Potassium Chloride ER (K-DUR M20) CR tab 40 mEq, 40 mEq, Oral, Q4H        HISTORY:  Past Medical History:  Past Medical History:   Diagnosis Date   • Anxiety state, unspecified    • Cataract    • Depression    • High blood pressure CREATSERUM 0.98 04/08/2021    BUN 22 (H) 04/08/2021     04/08/2021    K 3.7 04/08/2021     (H) 04/08/2021    CO2 16.0 (L) 04/08/2021     (H) 04/08/2021    CA 6.6 (L) 04/08/2021    ALB 2.2 (L) 04/15/2020    ALKPHO 83 04/13/2020    TP 6.5

## 2021-04-09 NOTE — PROGRESS NOTES
[facility]    Progress Note    Carley Chavez Patient Status:  Inpatient    1940 MRN [id number]   Location [facility] 4W/SW/SE Attending Catherine Sauceda MD   Hosp Day # 4 PCP Yimi Santana MD       Subjective:   Aden Diaz wheezes,rales or rhonchi. Listened anteriorly. Cardiovascular: S1, S2 normal, no murmur, click, rub or gallop, regular rate and rhythm. Premature beats noted. Rate controlled.   Abdominal: soft, non-tender; bowel sounds normal; no palpable masses,  no o at the time of discharge. New onset atrial fibrillation  –Patient was seen by cardiology 4/8/2021 and Eliquis was started. –Echocardiogram performed this morning, results pending.   –Atenolol was increased to 50 mg twice daily but heart rate is still a

## 2021-04-09 NOTE — PROGRESS NOTES
[facility]  Gastroenterology Progress Note    Benita Isbell Patient Status:  Inpatient    1940 MRN [id number]   Location [facility] 4W/SW/SE Attending Antonio Faust MD   Hosp Day # 4 PCP Allison Riley MD     Subjective:  Jean Srinivasan hernia     Polyclonal gammopathy     Iron deficiency anemia     Rectal cancer (HCC)     Malignant neoplasm of ovary (HCC)     Gastrointestinal hemorrhage     Gastrointestinal hemorrhage, unspecified gastrointestinal hemorrhage type     Anemia, unspecified

## 2021-04-10 PROCEDURE — 85014 HEMATOCRIT: CPT | Performed by: INTERNAL MEDICINE

## 2021-04-10 PROCEDURE — 86850 RBC ANTIBODY SCREEN: CPT | Performed by: INTERNAL MEDICINE

## 2021-04-10 PROCEDURE — 80069 RENAL FUNCTION PANEL: CPT | Performed by: INTERNAL MEDICINE

## 2021-04-10 PROCEDURE — 85018 HEMOGLOBIN: CPT | Performed by: INTERNAL MEDICINE

## 2021-04-10 PROCEDURE — 86901 BLOOD TYPING SEROLOGIC RH(D): CPT | Performed by: INTERNAL MEDICINE

## 2021-04-10 PROCEDURE — 86900 BLOOD TYPING SEROLOGIC ABO: CPT | Performed by: INTERNAL MEDICINE

## 2021-04-10 PROCEDURE — 86920 COMPATIBILITY TEST SPIN: CPT

## 2021-04-10 PROCEDURE — 99232 SBSQ HOSP IP/OBS MODERATE 35: CPT | Performed by: INTERNAL MEDICINE

## 2021-04-10 PROCEDURE — 36430 TRANSFUSION BLD/BLD COMPNT: CPT

## 2021-04-10 PROCEDURE — 83735 ASSAY OF MAGNESIUM: CPT | Performed by: INTERNAL MEDICINE

## 2021-04-10 PROCEDURE — 82962 GLUCOSE BLOOD TEST: CPT

## 2021-04-10 RX ORDER — POTASSIUM CHLORIDE 20 MEQ/1
40 TABLET, EXTENDED RELEASE ORAL ONCE
Status: COMPLETED | OUTPATIENT
Start: 2021-04-10 | End: 2021-04-10

## 2021-04-10 RX ORDER — ATENOLOL 50 MG/1
50 TABLET ORAL 2 TIMES DAILY
Qty: 180 TABLET | Refills: 0 | Status: SHIPPED | OUTPATIENT
Start: 2021-04-10

## 2021-04-10 RX ORDER — FUROSEMIDE 10 MG/ML
20 INJECTION INTRAMUSCULAR; INTRAVENOUS ONCE
Status: COMPLETED | OUTPATIENT
Start: 2021-04-10 | End: 2021-04-10

## 2021-04-10 RX ORDER — MELATONIN
325 2 TIMES DAILY WITH MEALS
Refills: 0 | Status: SHIPPED | COMMUNITY
Start: 2021-04-10

## 2021-04-10 RX ORDER — FUROSEMIDE 40 MG/1
40 TABLET ORAL DAILY
Status: DISCONTINUED | OUTPATIENT
Start: 2021-04-11 | End: 2021-04-11

## 2021-04-10 RX ORDER — SODIUM CHLORIDE 9 MG/ML
INJECTION, SOLUTION INTRAVENOUS ONCE
Status: COMPLETED | OUTPATIENT
Start: 2021-04-10 | End: 2021-04-10

## 2021-04-10 NOTE — PROGRESS NOTES
[facility]    Progress Note    Frannie Mix Patient Status:  Inpatient    1940 MRN [id number]   Location [facility] 4W/SW/SE Attending Torie Goodwin MD   Hosp Day # 5 PCP Vincenzo Sibley MD       Subjective:   Jaskaran Simmons 04/10/2021     (H) 04/10/2021    CO2 22.0 04/10/2021     (H) 04/10/2021    CA 6.9 (L) 04/10/2021    ALB 2.4 (L) 04/10/2021    ALKPHO 83 04/13/2020    BILT 0.2 04/13/2020    TP 6.5 04/13/2020    AST 21 04/13/2020    ALT 34 04/13/2020    PTT 26.

## 2021-04-10 NOTE — CM/SW NOTE
TITI noted- discharge today. RODOLFO noted Cards note stating IV Lasix today. RODOLFO checked in w/TOAN Adan March via [employer]. Pt not being discharged today. Pt to have IV Lasix & blood today. Possible discharge tomorrow. Arrangements are in place w/[facility].

## 2021-04-10 NOTE — PLAN OF CARE
Silvestre Degree states she is feeling well today. Electrolytes covered per protocol. One dose of IV lasix given. Up with standby assist and walker. 1 unit of PRBCs given today. Plan to be discharged home tomorrow with Charli Alarcon.   Problem: Patient Centered Care  Goal: P device and precautions (e.g. spinal or hip dislocation precautions)  Outcome: Progressing     Problem: Impaired Functional Mobility  Goal: Achieve highest/safest level of mobility/gait  Description: Interventions:  - Assess patient's functional ability and Identify barriers to discharge w/pt and caregiver  - Include patient/family/discharge partner in discharge planning  - Arrange for needed discharge resources and transportation as appropriate  - Identify discharge learning needs (meds, wound care, etc)  -

## 2021-04-10 NOTE — PROGRESS NOTES
[facility]    Cardiology - AM-S  Progress Note    Shun Aden Patient Status:  Inpatient    1940 MRN [id number]   Location [facility] 4W/SW/SE Attending Emiliano Wheat MD   Hosp Day # 5 PCP Frank Hamman, MD Bertina Bookbinder Intravenous Once   • ferrous sulfate  325 mg Oral BID with meals   • atenolol  50 mg Oral BID   • apixaban  2.5 mg Oral BID   • methylPREDNISolone acetate  80 mg Intra-articular Once   • bupivacaine (PF)  15 mL Other Once   • ceFAZolin  1 g Intravenous Q12

## 2021-04-10 NOTE — PROGRESS NOTES
[facility] AND [facility]  Gastroenterology Progress Note    Isabel Mosquera Patient Status:  Inpatient    1940 MRN [id number]   Location [facility] 4W/SW/SE Attending Louise Bernal MD   Hosp Day # 5 PCP Beckie Apgar, MD     Subjective:  Emmanuel Medina LIP, GGT, PSA, DDIMER, ESRML, ESRPF, CRP, BNP, TROP, CK, CKMB, TOBY, RPR, B12, ETOH, POCGLU in the last 168 hours. Invalid input(s): RF     Imaging:  No results found.      Problem list:  Patient Active Problem List:     Ischemic necrosis of small bowel ( Paroxysmal afib. On eliquis now      Recs:  1. Monitor hgb and transfuse as necessary  2.   1 unit prbc today  3. OK to discharge home after transfusion from GI perspective  4. Outpatient EGD/colonoscopy in a few weeks recommended.      Sony Willson

## 2021-04-10 NOTE — PROGRESS NOTES
[facility]    Progress Note    Kulwinder Quezada Patient Status:  Inpatient    1940 MRN [id number]   Location [facility] 4W/SW/SE Attending Demi Toledo MD   Hosp Day # 5 PCP Henny Lan MD     Date of Admission:  2021 Q12H  traMADol HCl (ULTRAM) tab 100 mg, 100 mg, Oral, Q12H PRN  lidocaine-menthol 4-1 % 1 patch, 1 patch, Transdermal, Daily  Loperamide HCl (IMODIUM) cap 4 mg, 4 mg, Oral, QID PRN  [COMPLETED] sodium chloride 0.9% IV bolus 500 mL, 500 mL, Intravenous, Onc 10 mg, Oral, TID PRN  [COMPLETED] Potassium Chloride ER (K-DUR M20) CR tab 40 mEq, 40 mEq, Oral, Q4H  HYDROcodone-acetaminophen (NORCO) 5-325 MG per tab 1 tablet, 1 tablet, Oral, Q6H PRN  [COMPLETED] Potassium Chloride ER (K-DUR M20) CR tab 40 mEq, 40 mEq, 109 (H) 04/10/2021    CA 6.9 (L) 04/10/2021    ALB 2.4 (L) 04/10/2021    ALKPHO 83 04/13/2020    TP 6.5 04/13/2020    AST 21 04/13/2020    ALT 34 04/13/2020    PTT 26.9 04/12/2020    INR 1.26 (H) 04/12/2020    PTP 15.7 (H) 04/12/2020    T4F 1.2 04/13/2020

## 2021-04-11 VITALS
BODY MASS INDEX: 26.31 KG/M2 | SYSTOLIC BLOOD PRESSURE: 152 MMHG | TEMPERATURE: 98 F | WEIGHT: 134 LBS | HEART RATE: 50 BPM | HEIGHT: 60 IN | RESPIRATION RATE: 18 BRPM | DIASTOLIC BLOOD PRESSURE: 77 MMHG | OXYGEN SATURATION: 92 %

## 2021-04-11 PROCEDURE — 84132 ASSAY OF SERUM POTASSIUM: CPT | Performed by: INTERNAL MEDICINE

## 2021-04-11 PROCEDURE — 83735 ASSAY OF MAGNESIUM: CPT | Performed by: INTERNAL MEDICINE

## 2021-04-11 PROCEDURE — 85014 HEMATOCRIT: CPT | Performed by: INTERNAL MEDICINE

## 2021-04-11 PROCEDURE — 82962 GLUCOSE BLOOD TEST: CPT

## 2021-04-11 PROCEDURE — 85018 HEMOGLOBIN: CPT | Performed by: INTERNAL MEDICINE

## 2021-04-11 RX ORDER — HYDROCHLOROTHIAZIDE 25 MG/1
25 TABLET ORAL DAILY
COMMUNITY

## 2021-04-11 RX ORDER — POTASSIUM CHLORIDE 20 MEQ/1
40 TABLET, EXTENDED RELEASE ORAL EVERY 4 HOURS
Status: DISCONTINUED | OUTPATIENT
Start: 2021-04-11 | End: 2021-04-11

## 2021-04-11 RX ORDER — MAGNESIUM SULFATE HEPTAHYDRATE 40 MG/ML
2 INJECTION, SOLUTION INTRAVENOUS ONCE
Status: COMPLETED | OUTPATIENT
Start: 2021-04-11 | End: 2021-04-11

## 2021-04-11 RX ORDER — POTASSIUM CHLORIDE 20 MEQ/1
20 TABLET, EXTENDED RELEASE ORAL 2 TIMES DAILY
Qty: 60 TABLET | Refills: 0 | Status: SHIPPED | OUTPATIENT
Start: 2021-04-11 | End: 2021-05-11

## 2021-04-11 RX ORDER — POTASSIUM CHLORIDE 1.5 G/1.77G
40 POWDER, FOR SOLUTION ORAL EVERY 4 HOURS
Status: DISCONTINUED | OUTPATIENT
Start: 2021-04-11 | End: 2021-04-11

## 2021-04-11 NOTE — PLAN OF CARE
Patient is sleeping in bed, vitals are stable, A&O x 4, and pain is being managed with norco. Tolerating general diet and has no complaints of nausea. Patient with up standby and a walker.  Tele is in place and there was one call for PSVT lasted 6secs with ordered activity level and limitations with patient/family  Outcome: Progressing  Goal: Maintain proper alignment of affected body part  Description: INTERVENTIONS:  - Support and protect limb and body alignment per provider's orders  - Instruct and reinfo activity based on assessment  - Modify environment to reduce risk of injury  - Provide assistive devices as appropriate  - Consider OT/PT consult to assist with strengthening/mobility  - Encourage toileting schedule  Outcome: Progressing     Problem: [facility] Instruct patient on self management of diabetes  Outcome: Progressing

## 2021-04-11 NOTE — DISCHARGE SUMMARY
[facility] - [facility]    Discharge Summary    Evgeny Ryan Patient Status:  Inpatient    1940 MRN [id number]   Location [facility] 4W/SW/SE Attending Arnel Ayala MD   Hosp Day # 6 PCP Ruchi Jackson MD     Date of Admission:  for further management. Following admission, urine culture came back positive for E. coli and antibiotics were narrowed to Ancef. Patient completed 7-day course of antibiotics prior to discharge.     Patient was noted to have irregular heart rhythm on tel Meds - Unchanged    hydrochlorothiazide 25 MG Oral Tab  Take 25 mg by mouth daily. Meclizine HCl 12.5 MG Oral Tab  Take 12.5 mg by mouth 3 (three) times daily as needed.     HYDROcodone-acetaminophen 5-325 MG Oral Tab  Take 1 tablet by mouth every 8 (eig representative from the home health agency will contact you or your family to schedule your first visit.             Emeterio Mojica  4/11/2021

## 2021-04-11 NOTE — PLAN OF CARE
Gonzales Banegas states that she is feeling better today. Potassium and magnesium covered per protocol. Up with standby asssit. Discharge paperwork discussed with patient and son at bedside.    Problem: Patient Centered Care  Goal: Patient preferences are identifie device and precautions (e.g. spinal or hip dislocation precautions)  Outcome: Adequate for Discharge     Problem: Impaired Functional Mobility  Goal: Achieve highest/safest level of mobility/gait  Description: Interventions:  - Assess patient's functional resources  Description: INTERVENTIONS:  - Identify barriers to discharge w/pt and caregiver  - Include patient/family/discharge partner in discharge planning  - Arrange for needed discharge resources and transportation as appropriate  - Identify discharge

## 2021-04-11 NOTE — CM/SW NOTE
RODOLFO follow up:  SW noticed pt was discharged. Arrangements are in place for [facility] w/[facility]. SW notified [facility] of discharge via Epic message. Plan: Returned home today w/[facility].      Taras Baugh

## 2021-04-20 ENCOUNTER — TELEPHONE (OUTPATIENT)
Dept: CARDIOLOGY | Age: 81
End: 2021-04-20

## 2021-04-20 DIAGNOSIS — I49.1 PREMATURE ATRIAL CONTRACTION: Primary | ICD-10-CM

## 2021-04-21 ENCOUNTER — TELEPHONE (OUTPATIENT)
Dept: CARDIOLOGY | Age: 81
End: 2021-04-21

## 2021-04-22 ENCOUNTER — TELEPHONE (OUTPATIENT)
Dept: CARDIOLOGY | Age: 81
End: 2021-04-22

## 2021-04-22 ENCOUNTER — OFFICE VISIT (OUTPATIENT)
Dept: CARDIOLOGY | Age: 81
End: 2021-04-22

## 2021-04-22 ENCOUNTER — ANCILLARY PROCEDURE (OUTPATIENT)
Dept: CARDIOLOGY | Age: 81
End: 2021-04-22
Attending: INTERNAL MEDICINE

## 2021-04-22 VITALS
WEIGHT: 129 LBS | OXYGEN SATURATION: 100 % | HEART RATE: 68 BPM | BODY MASS INDEX: 27.83 KG/M2 | SYSTOLIC BLOOD PRESSURE: 114 MMHG | DIASTOLIC BLOOD PRESSURE: 68 MMHG | HEIGHT: 57 IN

## 2021-04-22 DIAGNOSIS — I49.1 PREMATURE ATRIAL CONTRACTION: ICD-10-CM

## 2021-04-22 DIAGNOSIS — I48.91 ATRIAL FIBRILLATION, UNSPECIFIED TYPE (CMD): Primary | ICD-10-CM

## 2021-04-22 PROCEDURE — 99214 OFFICE O/P EST MOD 30 MIN: CPT | Performed by: NURSE PRACTITIONER

## 2021-04-22 RX ORDER — HYDROCODONE BITARTRATE AND ACETAMINOPHEN 5; 325 MG/1; MG/1
1 TABLET ORAL EVERY 8 HOURS PRN
COMMUNITY
Start: 2021-01-04

## 2021-04-22 RX ORDER — PSYLLIUM HUSK (WITH SUGAR) 3 G/12 G
3 POWDER (GRAM) ORAL DAILY
COMMUNITY
Start: 2019-07-23 | End: 2021-04-22

## 2021-04-22 RX ORDER — DULOXETIN HYDROCHLORIDE 30 MG/1
30 CAPSULE, DELAYED RELEASE ORAL 2 TIMES DAILY
COMMUNITY
Start: 2021-03-19 | End: 2021-04-22

## 2021-04-22 RX ORDER — ACETAMINOPHEN 325 MG/1
650 TABLET ORAL EVERY 4 HOURS PRN
COMMUNITY
Start: 2018-12-16 | End: 2021-04-22

## 2021-04-22 RX ORDER — GABAPENTIN 300 MG/1
CAPSULE ORAL
COMMUNITY
Start: 2021-01-28

## 2021-04-22 RX ORDER — MECLIZINE HCL 12.5 MG/1
1 TABLET ORAL 3 TIMES DAILY PRN
COMMUNITY
Start: 2021-03-19

## 2021-04-22 RX ORDER — CYANOCOBALAMIN 1000 UG/ML
1000 INJECTION, SOLUTION INTRAMUSCULAR; SUBCUTANEOUS
COMMUNITY
Start: 2019-11-29

## 2021-04-22 RX ORDER — TRAMADOL HYDROCHLORIDE 50 MG/1
1 TABLET ORAL EVERY 12 HOURS PRN
COMMUNITY
Start: 2021-04-15

## 2021-04-22 RX ORDER — PSEUDOEPHEDRINE HCL 30 MG
100 TABLET ORAL 2 TIMES DAILY
COMMUNITY

## 2021-04-22 RX ORDER — ALPRAZOLAM 0.25 MG/1
0.25 TABLET ORAL EVERY MORNING
COMMUNITY
Start: 2021-01-18

## 2021-04-22 RX ORDER — ONDANSETRON 8 MG/1
8 TABLET, ORALLY DISINTEGRATING ORAL EVERY 8 HOURS PRN
COMMUNITY
End: 2021-04-22

## 2021-04-22 RX ORDER — DIPHENOXYLATE HYDROCHLORIDE AND ATROPINE SULFATE 2.5; .025 MG/1; MG/1
1 TABLET ORAL EVERY 4 HOURS PRN
COMMUNITY
Start: 2020-04-25

## 2021-04-22 RX ORDER — METHOCARBAMOL 500 MG/1
1 TABLET, FILM COATED ORAL DAILY
Refills: 1 | COMMUNITY
Start: 2021-04-19 | End: 2021-04-22

## 2021-04-22 RX ORDER — MELOXICAM 15 MG/1
1 TABLET ORAL DAILY
COMMUNITY
Start: 2021-03-09 | End: 2021-04-22

## 2021-04-22 RX ORDER — FAMOTIDINE 40 MG/1
40 TABLET, FILM COATED ORAL NIGHTLY
COMMUNITY
Start: 2019-10-31

## 2021-04-22 ASSESSMENT — PATIENT HEALTH QUESTIONNAIRE - PHQ9
1. LITTLE INTEREST OR PLEASURE IN DOING THINGS: NOT AT ALL
SUM OF ALL RESPONSES TO PHQ9 QUESTIONS 1 AND 2: 0
2. FEELING DOWN, DEPRESSED OR HOPELESS: NOT AT ALL
CLINICAL INTERPRETATION OF PHQ2 SCORE: NO FURTHER SCREENING NEEDED
CLINICAL INTERPRETATION OF PHQ9 SCORE: NO FURTHER SCREENING NEEDED
SUM OF ALL RESPONSES TO PHQ9 QUESTIONS 1 AND 2: 0

## 2021-05-14 ENCOUNTER — LAB REQUISITION (OUTPATIENT)
Dept: LAB | Facility: HOSPITAL | Age: 81
End: 2021-05-14
Payer: MEDICARE

## 2021-05-14 DIAGNOSIS — I12.9 HYPERTENSIVE CHRONIC KIDNEY DISEASE WITH STAGE 1 THROUGH STAGE 4 CHRONIC KIDNEY DISEASE, OR UNSPECIFIED CHRONIC KIDNEY DISEASE: ICD-10-CM

## 2021-05-14 PROCEDURE — 80048 BASIC METABOLIC PNL TOTAL CA: CPT

## 2021-05-14 PROCEDURE — 85025 COMPLETE CBC W/AUTO DIFF WBC: CPT

## 2021-05-27 ENCOUNTER — APPOINTMENT (OUTPATIENT)
Dept: CARDIOLOGY | Age: 81
End: 2021-05-27

## 2021-06-07 ENCOUNTER — OFFICE VISIT (OUTPATIENT)
Dept: CARDIOLOGY | Age: 81
End: 2021-06-07

## 2021-06-07 VITALS
HEART RATE: 73 BPM | SYSTOLIC BLOOD PRESSURE: 110 MMHG | BODY MASS INDEX: 28.05 KG/M2 | OXYGEN SATURATION: 99 % | DIASTOLIC BLOOD PRESSURE: 72 MMHG | HEIGHT: 57 IN | WEIGHT: 130 LBS

## 2021-06-07 DIAGNOSIS — E78.2 MIXED HYPERLIPIDEMIA: ICD-10-CM

## 2021-06-07 DIAGNOSIS — I49.1 PAC (PREMATURE ATRIAL CONTRACTION): ICD-10-CM

## 2021-06-07 DIAGNOSIS — I10 ESSENTIAL HYPERTENSION: Primary | ICD-10-CM

## 2021-06-07 DIAGNOSIS — I34.0 NONRHEUMATIC MITRAL VALVE REGURGITATION: ICD-10-CM

## 2021-06-07 PROCEDURE — 99214 OFFICE O/P EST MOD 30 MIN: CPT | Performed by: INTERNAL MEDICINE

## 2021-06-07 RX ORDER — ASPIRIN 81 MG/1
81 TABLET ORAL DAILY
Status: SHIPPED | COMMUNITY
Start: 2021-06-07

## 2021-06-07 ASSESSMENT — PATIENT HEALTH QUESTIONNAIRE - PHQ9
2. FEELING DOWN, DEPRESSED OR HOPELESS: NOT AT ALL
1. LITTLE INTEREST OR PLEASURE IN DOING THINGS: NOT AT ALL
SUM OF ALL RESPONSES TO PHQ9 QUESTIONS 1 AND 2: 0
CLINICAL INTERPRETATION OF PHQ9 SCORE: NO FURTHER SCREENING NEEDED
SUM OF ALL RESPONSES TO PHQ9 QUESTIONS 1 AND 2: 0
CLINICAL INTERPRETATION OF PHQ2 SCORE: NO FURTHER SCREENING NEEDED

## 2021-06-14 PROCEDURE — 93272 ECG/REVIEW INTERPRET ONLY: CPT | Performed by: INTERNAL MEDICINE

## 2021-12-20 ENCOUNTER — HOSPITAL ENCOUNTER (OUTPATIENT)
Dept: CV DIAGNOSTICS | Facility: HOSPITAL | Age: 81
Discharge: HOME | End: 2021-12-20
Attending: INTERNAL MEDICINE
Payer: MEDICARE

## 2021-12-20 DIAGNOSIS — I10 HYPERTENSION: ICD-10-CM

## 2021-12-20 DIAGNOSIS — I34.0 NON-RHEUMATIC MITRAL REGURGITATION: ICD-10-CM

## 2021-12-20 PROCEDURE — 93306 TTE W/DOPPLER COMPLETE: CPT | Performed by: INTERNAL MEDICINE

## 2022-04-11 ENCOUNTER — OFFICE VISIT (OUTPATIENT)
Dept: SURGERY | Facility: CLINIC | Age: 82
End: 2022-04-11
Payer: MEDICARE

## 2022-04-11 DIAGNOSIS — M67.441 GANGLION OF RIGHT HAND: Primary | ICD-10-CM

## 2022-04-11 PROCEDURE — 99204 OFFICE O/P NEW MOD 45 MIN: CPT | Performed by: PLASTIC SURGERY

## 2022-04-11 RX ORDER — HYDROCODONE BITARTRATE AND ACETAMINOPHEN 5; 325 MG/1; MG/1
TABLET ORAL
Qty: 25 TABLET | Refills: 0 | Status: SHIPPED | OUTPATIENT
Start: 2022-04-11

## 2022-04-11 NOTE — PROGRESS NOTES
Patient request for surgery signed by patient and witnessed and signed by RN. Prescription for Norco and narcotic prescription electronically sent to pharmacy per Dr. Doherty Race order and patient instructed to  prescription before surgery. Pre-Surgical Instruction Handout, Hand Elevation Handout, Dressing Protector Handout, and Post-Operative Instruction Handout given to and reviewed w/patient. All questions and concerns answered; pt verbalized an understanding of all pre-operative teaching. Patient instructed to call the office with any further questions and/or concerns. Patient escorted to surgery scheduling to schedule surgery and post-operative appointments.

## 2022-09-26 NOTE — PROGRESS NOTES
[facility]    Progress Note    Joy Justice Patient Status:  Inpatient    1940 MRN [id number]   Location [facility] 4W/SW/SE Attending Archie Cintron MD   Hosp Day # 8 PCP Loreta Forrester MD       Subjective:   Lisa Novak normal 81 (H) 10/05/2018    CRP 2.21 (H) 04/16/2020    MG 1.4 (L) 04/15/2020    PHOS 2.6 04/15/2020    TROP <0.045 04/12/2020    CK 50 04/12/2020    B12 408 12/17/2019       Us Venous Doppler Leg Bilat - Diag Img (cpt=93970)    Result Date: 4/20/2020  CONCLUSION:

## 2022-10-14 VITALS — WEIGHT: 130 LBS | BODY MASS INDEX: 25.52 KG/M2 | HEIGHT: 60 IN

## 2022-10-14 RX ORDER — SODIUM CHLORIDE 9 MG/ML
INJECTION, SOLUTION INTRAVENOUS CONTINUOUS
Status: CANCELLED | OUTPATIENT
Start: 2022-10-14

## 2022-10-14 RX ORDER — AMIODARONE HYDROCHLORIDE 200 MG/1
200 TABLET ORAL 2 TIMES DAILY
COMMUNITY
Start: 2022-10-14

## 2022-10-15 ENCOUNTER — LAB ENCOUNTER (OUTPATIENT)
Dept: LAB | Facility: HOSPITAL | Age: 82
End: 2022-10-15
Attending: INTERNAL MEDICINE
Payer: MEDICARE

## 2022-10-15 DIAGNOSIS — Z01.818 PRE-OP TESTING: ICD-10-CM

## 2022-10-15 LAB
ANION GAP SERPL CALC-SCNC: 10 MMOL/L (ref 0–18)
BUN BLD-MCNC: 23 MG/DL (ref 7–18)
BUN/CREAT SERPL: 18.1 (ref 10–20)
CALCIUM BLD-MCNC: 8.5 MG/DL (ref 8.5–10.1)
CHLORIDE SERPL-SCNC: 111 MMOL/L (ref 98–112)
CO2 SERPL-SCNC: 21 MMOL/L (ref 21–32)
CREAT BLD-MCNC: 1.27 MG/DL
DEPRECATED RDW RBC AUTO: 52.7 FL (ref 35.1–46.3)
ERYTHROCYTE [DISTWIDTH] IN BLOOD BY AUTOMATED COUNT: 13.5 % (ref 11–15)
FASTING STATUS PATIENT QL REPORTED: NO
GFR SERPLBLD BASED ON 1.73 SQ M-ARVRAT: 42 ML/MIN/1.73M2 (ref 60–?)
GLUCOSE BLD-MCNC: 88 MG/DL (ref 70–99)
HCT VFR BLD AUTO: 26.5 %
HGB BLD-MCNC: 8.7 G/DL
MCH RBC QN AUTO: 34.9 PG (ref 26–34)
MCHC RBC AUTO-ENTMCNC: 32.8 G/DL (ref 31–37)
MCV RBC AUTO: 106.4 FL
OSMOLALITY SERPL CALC.SUM OF ELEC: 297 MOSM/KG (ref 275–295)
PLATELET # BLD AUTO: 245 10(3)UL (ref 150–450)
POTASSIUM SERPL-SCNC: 3.5 MMOL/L (ref 3.5–5.1)
RBC # BLD AUTO: 2.49 X10(6)UL
SARS-COV-2 RNA RESP QL NAA+PROBE: NOT DETECTED
SODIUM SERPL-SCNC: 142 MMOL/L (ref 136–145)
WBC # BLD AUTO: 5.8 X10(3) UL (ref 4–11)

## 2022-10-15 PROCEDURE — 85027 COMPLETE CBC AUTOMATED: CPT

## 2022-10-15 PROCEDURE — 80048 BASIC METABOLIC PNL TOTAL CA: CPT

## 2022-10-15 PROCEDURE — 36415 COLL VENOUS BLD VENIPUNCTURE: CPT

## 2022-10-18 ENCOUNTER — HOSPITAL ENCOUNTER (OUTPATIENT)
Dept: INTERVENTIONAL RADIOLOGY/VASCULAR | Facility: HOSPITAL | Age: 82
Discharge: HOME | End: 2022-10-18
Attending: INTERNAL MEDICINE
Payer: MEDICARE

## 2022-10-18 DIAGNOSIS — Z01.818 PRE-OP TESTING: Primary | ICD-10-CM

## 2022-10-18 NOTE — H&P
I saw and examined the patient and agree with the attached assessment and plan. I discussed the risks and benefits of the procedure and patient was agreeable to proceed with ISABELLA/DCCV for atrial fibrillation.     Caitlyn Reyes MD  [address] [place] [address]

## 2022-10-21 ENCOUNTER — HOSPITAL ENCOUNTER (OUTPATIENT)
Dept: INTERVENTIONAL RADIOLOGY/VASCULAR | Facility: HOSPITAL | Age: 82
Discharge: HOME | End: 2022-10-21
Attending: STUDENT IN AN ORGANIZED HEALTH CARE EDUCATION/TRAINING PROGRAM | Admitting: STUDENT IN AN ORGANIZED HEALTH CARE EDUCATION/TRAINING PROGRAM
Payer: MEDICARE

## 2022-10-21 ENCOUNTER — HOSPITAL ENCOUNTER (OUTPATIENT)
Dept: CV DIAGNOSTICS | Facility: HOSPITAL | Age: 82
Discharge: HOME | End: 2022-10-21
Attending: STUDENT IN AN ORGANIZED HEALTH CARE EDUCATION/TRAINING PROGRAM | Admitting: STUDENT IN AN ORGANIZED HEALTH CARE EDUCATION/TRAINING PROGRAM
Payer: MEDICARE

## 2022-10-21 VITALS
HEART RATE: 70 BPM | DIASTOLIC BLOOD PRESSURE: 58 MMHG | SYSTOLIC BLOOD PRESSURE: 126 MMHG | HEIGHT: 57 IN | OXYGEN SATURATION: 100 % | BODY MASS INDEX: 28.05 KG/M2 | TEMPERATURE: 98 F | RESPIRATION RATE: 16 BRPM | WEIGHT: 130 LBS

## 2022-10-21 DIAGNOSIS — I48.91 ATRIAL FIBRILLATION (HCC): ICD-10-CM

## 2022-10-21 LAB — SARS-COV-2 RNA RESP QL NAA+PROBE: NOT DETECTED

## 2022-10-21 PROCEDURE — B24BZZ4 ULTRASONOGRAPHY OF HEART WITH AORTA, TRANSESOPHAGEAL: ICD-10-PCS | Performed by: STUDENT IN AN ORGANIZED HEALTH CARE EDUCATION/TRAINING PROGRAM

## 2022-10-21 PROCEDURE — 92960 CARDIOVERSION ELECTRIC EXT: CPT

## 2022-10-21 PROCEDURE — 93312 ECHO TRANSESOPHAGEAL: CPT

## 2022-10-21 PROCEDURE — 93325 DOPPLER ECHO COLOR FLOW MAPG: CPT | Performed by: STUDENT IN AN ORGANIZED HEALTH CARE EDUCATION/TRAINING PROGRAM

## 2022-10-21 PROCEDURE — 93320 DOPPLER ECHO COMPLETE: CPT | Performed by: STUDENT IN AN ORGANIZED HEALTH CARE EDUCATION/TRAINING PROGRAM

## 2022-10-21 PROCEDURE — 93005 ELECTROCARDIOGRAM TRACING: CPT

## 2022-10-21 PROCEDURE — 36415 COLL VENOUS BLD VENIPUNCTURE: CPT

## 2022-10-21 PROCEDURE — 5A2204Z RESTORATION OF CARDIAC RHYTHM, SINGLE: ICD-10-PCS | Performed by: STUDENT IN AN ORGANIZED HEALTH CARE EDUCATION/TRAINING PROGRAM

## 2022-10-21 PROCEDURE — 93010 ELECTROCARDIOGRAM REPORT: CPT | Performed by: STUDENT IN AN ORGANIZED HEALTH CARE EDUCATION/TRAINING PROGRAM

## 2022-10-21 RX ORDER — MIDAZOLAM HYDROCHLORIDE 1 MG/ML
2 INJECTION INTRAMUSCULAR; INTRAVENOUS ONCE
Status: COMPLETED | OUTPATIENT
Start: 2022-10-21 | End: 2022-10-21

## 2022-10-21 RX ORDER — POTASSIUM CHLORIDE 1500 MG/1
20 TABLET, EXTENDED RELEASE ORAL 2 TIMES DAILY
COMMUNITY
Start: 2022-10-09

## 2022-10-21 RX ORDER — LIDOCAINE HYDROCHLORIDE 20 MG/ML
SOLUTION OROPHARYNGEAL
Status: COMPLETED
Start: 2022-10-21 | End: 2022-10-21

## 2022-10-21 RX ORDER — SODIUM CHLORIDE 9 MG/ML
INJECTION, SOLUTION INTRAVENOUS CONTINUOUS
Status: DISCONTINUED | OUTPATIENT
Start: 2022-10-21 | End: 2022-10-21

## 2022-10-21 RX ORDER — METOPROLOL SUCCINATE 50 MG/1
50 TABLET, EXTENDED RELEASE ORAL DAILY
COMMUNITY
Start: 2022-09-13

## 2022-10-21 RX ORDER — LIDOCAINE HYDROCHLORIDE 20 MG/ML
5 SOLUTION OROPHARYNGEAL ONCE
Status: COMPLETED | OUTPATIENT
Start: 2022-10-21 | End: 2022-10-21

## 2022-10-21 RX ORDER — MIDAZOLAM HYDROCHLORIDE 1 MG/ML
INJECTION INTRAMUSCULAR; INTRAVENOUS
Status: DISCONTINUED
Start: 2022-10-21 | End: 2022-10-21 | Stop reason: WASHOUT

## 2022-10-21 RX ORDER — MIDAZOLAM HYDROCHLORIDE 1 MG/ML
INJECTION INTRAMUSCULAR; INTRAVENOUS
Status: DISCONTINUED
Start: 2022-10-21 | End: 2022-10-21

## 2022-10-21 RX ADMIN — MIDAZOLAM HYDROCHLORIDE 2 MG: 1 INJECTION INTRAMUSCULAR; INTRAVENOUS at 13:00:00

## 2022-10-21 RX ADMIN — Medication 30 MG: at 12:00:00

## 2022-10-21 RX ADMIN — SODIUM CHLORIDE: 9 INJECTION, SOLUTION INTRAVENOUS at 11:30:00

## 2022-10-21 RX ADMIN — LIDOCAINE HYDROCHLORIDE 5 ML: 20 SOLUTION OROPHARYNGEAL at 11:50:00

## 2022-10-21 NOTE — PROCEDURES
[facility]    Cardiac Electrophysiology Procedure Note    [facility]   [facility] [id number] MRN [id number]   Admission Date 10/21/2022 Procedure Date 10/21/2022   Attending Physician Leonora Bah MD Procedure Physician Rich Chauhan MD       Pre-Operative Diagnosis: Persistent atrial fibrillation    Post-Operative Diagnosis: Persistent atrial fibrillation    Procedure Performed:    1. Direct current cardioversion    Indication: Persistent atrial fibrillation    Anesthesia: 2 mg Versed, 50 mcg Fentanyl for ISABELLA. 30 mg Brevital for DCCV    Complications: None apparent    Procedural findings: The patient was brought to the procedure suite in stable and postabsorptive state after providing informed consent. A time out was performed to confirm the patient's identity and type and site of the procedure. The patient received a 200 Joule synchronized biphasic shock via anterior-posterior pads, which converted the patient to sinus rhythm. The patient was recovered by myself and staff and transferred to the recovery area in stable and comfortable condition. RESULTS:  -- Successful electrical cardioversion to sinus rhythm    PLAN:  1. Recovery by myself and nursing  2. 12 lead ECG  3. Continue anticoagulation  4. Discharge after anesthesia recovery criteria met  5. Activity and driving restrictions x 24 hours  6.  Follow-up with Dr Tiago Mckeon as scheduled    Rich Chauhan MD, 10/21/22, 12:53 PM  [address] [place] [address]

## 2022-10-21 NOTE — IVS NOTE
DISCHARGE NOTE     Pt is able to sit up and ambulate without difficulty. Pt voided and tolerated fluids and food. IV access removed  EKG completed   Instruction provided, patient verbalizes understanding. Dr. Brooke Kwon spoke with patient pre procedure.      Pt discharge via wheelchair to [address] [place] [address] B     Follow up Appointment: 10/24 with Dr. Buck Ndiaye Prescription: Helga Arias
Yes

## 2023-02-15 ENCOUNTER — HOSPITAL ENCOUNTER (OUTPATIENT)
Age: 83
Discharge: HOME | End: 2023-02-15
Payer: MEDICARE

## 2023-02-15 ENCOUNTER — APPOINTMENT (OUTPATIENT)
Dept: GENERAL RADIOLOGY | Age: 83
End: 2023-02-15
Attending: NURSE PRACTITIONER
Payer: MEDICARE

## 2023-02-15 VITALS
TEMPERATURE: 98 F | DIASTOLIC BLOOD PRESSURE: 80 MMHG | HEART RATE: 81 BPM | RESPIRATION RATE: 18 BRPM | SYSTOLIC BLOOD PRESSURE: 140 MMHG | OXYGEN SATURATION: 97 %

## 2023-02-15 DIAGNOSIS — M79.89 SWELLING OF LEFT HAND: Primary | ICD-10-CM

## 2023-02-15 DIAGNOSIS — Z86.2 HISTORY OF ANEMIA: ICD-10-CM

## 2023-02-15 LAB
#MXD IC: 0.9 X10ˆ3/UL (ref 0.1–1)
HCT VFR BLD AUTO: 27.7 %
HGB BLD-MCNC: 8.9 G/DL
LYMPHOCYTES # BLD AUTO: 1.1 X10ˆ3/UL (ref 1–4)
LYMPHOCYTES NFR BLD AUTO: 13.3 %
MCH RBC QN AUTO: 32.6 PG (ref 26–34)
MCHC RBC AUTO-ENTMCNC: 32.1 G/DL (ref 31–37)
MCV RBC AUTO: 101.5 FL (ref 80–100)
MIXED CELL %: 11.7 %
NEUTROPHILS # BLD AUTO: 6.1 X10ˆ3/UL (ref 1.5–7.7)
NEUTROPHILS NFR BLD AUTO: 75 %
PLATELET # BLD AUTO: 249 X10ˆ3/UL (ref 150–450)
RBC # BLD AUTO: 2.73 X10ˆ6/UL
WBC # BLD AUTO: 8.1 X10ˆ3/UL (ref 4–11)

## 2023-02-15 PROCEDURE — 73130 X-RAY EXAM OF HAND: CPT | Performed by: NURSE PRACTITIONER

## 2023-02-15 PROCEDURE — 85025 COMPLETE CBC W/AUTO DIFF WBC: CPT | Performed by: NURSE PRACTITIONER

## 2023-02-15 PROCEDURE — 90715 TDAP VACCINE 7 YRS/> IM: CPT | Performed by: NURSE PRACTITIONER

## 2023-02-15 PROCEDURE — 90471 IMMUNIZATION ADMIN: CPT | Performed by: NURSE PRACTITIONER

## 2023-02-15 PROCEDURE — 99213 OFFICE O/P EST LOW 20 MIN: CPT | Performed by: NURSE PRACTITIONER

## 2023-02-15 PROCEDURE — A9150 MISC/EXPER NON-PRESCRIPT DRU: HCPCS | Performed by: NURSE PRACTITIONER

## 2023-02-15 RX ORDER — METHYLPREDNISOLONE 4 MG/1
TABLET ORAL
Qty: 1 EACH | Refills: 0 | Status: SHIPPED | OUTPATIENT
Start: 2023-02-15

## 2023-02-15 RX ORDER — CEPHALEXIN 500 MG/1
500 CAPSULE ORAL 4 TIMES DAILY
Qty: 40 CAPSULE | Refills: 0 | Status: SHIPPED | OUTPATIENT
Start: 2023-02-15 | End: 2023-02-25

## 2023-02-15 RX ORDER — ACETAMINOPHEN 500 MG
1000 TABLET ORAL ONCE
Status: COMPLETED | OUTPATIENT
Start: 2023-02-15 | End: 2023-02-15

## 2023-02-15 NOTE — ED INITIAL ASSESSMENT (HPI)
Pt in [address] [place] [address] for c/o L hand swelling/redness x yesteday. States has small abrasion that keeps reopening to top middle finger.

## 2023-03-29 ENCOUNTER — HOSPITAL ENCOUNTER (OUTPATIENT)
Dept: GENERAL RADIOLOGY | Age: 83
Discharge: HOME | End: 2023-03-29
Attending: INTERNAL MEDICINE
Payer: MEDICARE

## 2023-03-29 DIAGNOSIS — M25.562 LEFT KNEE PAIN: ICD-10-CM

## 2023-03-29 PROCEDURE — 73564 X-RAY EXAM KNEE 4 OR MORE: CPT | Performed by: INTERNAL MEDICINE

## 2023-04-10 PROBLEM — I34.0 NONRHEUMATIC MITRAL VALVE REGURGITATION: Status: ACTIVE | Noted: 2021-06-07

## 2023-04-10 PROBLEM — R26.81 UNSTEADINESS ON FEET: Status: ACTIVE | Noted: 2018-12-16

## 2023-04-10 PROBLEM — C56.9 MALIGNANT NEOPLASM OF UNSPECIFIED OVARY (HCC): Status: ACTIVE | Noted: 2020-04-25

## 2023-04-10 PROBLEM — I47.20 VENTRICULAR TACHYCARDIA (HCC): Status: ACTIVE | Noted: 2020-04-25

## 2023-04-10 PROBLEM — K21.9 GASTRO-ESOPHAGEAL REFLUX DISEASE WITHOUT ESOPHAGITIS: Status: ACTIVE | Noted: 2018-12-16

## 2023-04-10 PROBLEM — F41.9 ANXIETY DISORDER, UNSPECIFIED: Status: ACTIVE | Noted: 2020-04-25

## 2023-04-10 PROBLEM — E11.40 TYPE 2 DIABETES MELLITUS WITH DIABETIC NEUROPATHY, UNSPECIFIED (HCC): Status: ACTIVE | Noted: 2018-12-17

## 2023-04-10 PROBLEM — R19.00 INTRA-ABDOMINAL AND PELVIC SWELLING, MASS AND LUMP, UNSPECIFIED SITE: Status: ACTIVE | Noted: 2018-12-16

## 2023-04-10 PROBLEM — G89.29 CHRONIC PAIN OF LEFT KNEE: Status: ACTIVE | Noted: 2023-04-10

## 2023-04-10 PROBLEM — N32.9 BLADDER DISORDER, UNSPECIFIED: Status: ACTIVE | Noted: 2018-12-16

## 2023-04-10 PROBLEM — E03.9 HYPOTHYROIDISM, UNSPECIFIED: Status: ACTIVE | Noted: 2018-12-16

## 2023-04-10 PROBLEM — M11.20 CHONDROCALCINOSIS: Status: ACTIVE | Noted: 2023-04-10

## 2023-04-10 PROBLEM — K66.0 PERITONEAL ADHESIONS (POSTPROCEDURAL) (POSTINFECTION): Status: ACTIVE | Noted: 2018-12-16

## 2023-04-10 PROBLEM — M25.562 CHRONIC PAIN OF LEFT KNEE: Status: ACTIVE | Noted: 2023-04-10

## 2023-04-10 PROBLEM — E44.0 MODERATE PROTEIN-CALORIE MALNUTRITION (HCC): Status: ACTIVE | Noted: 2018-12-16

## 2023-04-10 PROBLEM — M79.605 PAIN IN LEFT LEG: Status: ACTIVE | Noted: 2020-04-25

## 2023-04-10 PROBLEM — F32.9 MAJOR DEPRESSIVE DISORDER, SINGLE EPISODE, UNSPECIFIED: Status: ACTIVE | Noted: 2018-12-16

## 2023-04-10 PROBLEM — M62.81 MUSCLE WEAKNESS (GENERALIZED): Status: ACTIVE | Noted: 2018-12-16

## 2023-04-10 PROBLEM — I10 ESSENTIAL (PRIMARY) HYPERTENSION: Status: ACTIVE | Noted: 2018-12-16

## 2023-04-10 PROBLEM — N17.9 ACUTE KIDNEY FAILURE, UNSPECIFIED (HCC): Status: ACTIVE | Noted: 2020-04-25

## 2023-04-10 PROBLEM — M17.12 PRIMARY OSTEOARTHRITIS OF LEFT KNEE: Status: ACTIVE | Noted: 2023-04-10

## 2023-04-10 PROBLEM — H26.9 CATARACT: Status: ACTIVE | Noted: 2020-04-25

## 2023-04-10 PROBLEM — R11.0 NAUSEA: Status: ACTIVE | Noted: 2020-04-25

## 2023-05-01 PROBLEM — C66.9: Status: ACTIVE | Noted: 2020-04-25

## 2023-05-01 PROBLEM — E53.8 DEFICIENCY OF VITAMIN B12: Status: ACTIVE | Noted: 2022-10-18

## 2023-05-01 PROBLEM — E11.9 TYPE 2 DIABETES MELLITUS WITHOUT COMPLICATIONS (HCC): Status: ACTIVE | Noted: 2020-04-25

## 2023-05-09 ENCOUNTER — TELEPHONE (OUTPATIENT)
Dept: OTHER | Age: 83
End: 2023-05-09

## 2023-05-25 PROBLEM — C21.0: Status: ACTIVE | Noted: 2020-04-25

## 2023-05-25 PROBLEM — M47.817 LUMBOSACRAL SPONDYLOSIS WITHOUT MYELOPATHY: Status: ACTIVE | Noted: 2023-05-25

## 2023-08-22 ENCOUNTER — HOSPITAL ENCOUNTER (OUTPATIENT)
Dept: GENERAL RADIOLOGY | Facility: HOSPITAL | Age: 83
Discharge: HOME | End: 2023-08-22
Attending: GENERAL ACUTE CARE HOSPITAL
Payer: MEDICARE

## 2023-08-22 DIAGNOSIS — M48.02 SPINAL STENOSIS IN CERVICAL REGION: ICD-10-CM

## 2023-08-22 DIAGNOSIS — M79.12 MYALGIA OF AUXILIARY MUSCLES, HEAD AND NECK: ICD-10-CM

## 2023-08-22 PROCEDURE — 72050 X-RAY EXAM NECK SPINE 4/5VWS: CPT | Performed by: GENERAL ACUTE CARE HOSPITAL

## 2023-09-28 PROBLEM — S80.02XA CONTUSION OF LEFT KNEE: Status: ACTIVE | Noted: 2023-09-28

## 2023-09-28 PROBLEM — S80.02XA CONTUSION OF LEFT KNEE: Status: ACTIVE | Noted: 2023-01-01

## 2023-09-28 PROBLEM — S89.92XA INJURY OF LEFT KNEE: Status: ACTIVE | Noted: 2023-09-28

## 2023-09-28 PROBLEM — S89.92XA INJURY OF LEFT KNEE: Status: ACTIVE | Noted: 2023-01-01

## 2024-01-01 ENCOUNTER — APPOINTMENT (OUTPATIENT)
Dept: GENERAL RADIOLOGY | Facility: HOSPITAL | Age: 84
End: 2024-01-01
Attending: EMERGENCY MEDICINE
Payer: MEDICARE

## 2024-01-01 ENCOUNTER — HOSPITAL ENCOUNTER (INPATIENT)
Dept: CV DIAGNOSTICS | Facility: HOSPITAL | Age: 84
End: 2024-01-01
Attending: INTERNAL MEDICINE
Payer: MEDICARE

## 2024-01-01 ENCOUNTER — HOSPITAL ENCOUNTER (INPATIENT)
Facility: HOSPITAL | Age: 84
LOS: 1 days | End: 2024-01-01
Attending: EMERGENCY MEDICINE | Admitting: HOSPITALIST
Payer: MEDICARE

## 2024-01-01 ENCOUNTER — APPOINTMENT (OUTPATIENT)
Dept: INTERVENTIONAL RADIOLOGY/VASCULAR | Facility: HOSPITAL | Age: 84
End: 2024-01-01
Attending: INTERNAL MEDICINE
Payer: MEDICARE

## 2024-01-01 ENCOUNTER — APPOINTMENT (OUTPATIENT)
Dept: CT IMAGING | Facility: HOSPITAL | Age: 84
End: 2024-01-01
Attending: EMERGENCY MEDICINE
Payer: MEDICARE

## 2024-01-01 ENCOUNTER — APPOINTMENT (OUTPATIENT)
Dept: GENERAL RADIOLOGY | Facility: HOSPITAL | Age: 84
End: 2024-01-01
Attending: HOSPITALIST
Payer: MEDICARE

## 2024-01-01 VITALS
HEIGHT: 62 IN | TEMPERATURE: 100 F | SYSTOLIC BLOOD PRESSURE: 77 MMHG | DIASTOLIC BLOOD PRESSURE: 12 MMHG | WEIGHT: 119.94 LBS | BODY MASS INDEX: 22.07 KG/M2 | OXYGEN SATURATION: 99 % | HEART RATE: 54 BPM

## 2024-01-01 DIAGNOSIS — I21.3 ST ELEVATION MYOCARDIAL INFARCTION (STEMI), UNSPECIFIED ARTERY (HCC): Primary | ICD-10-CM

## 2024-01-01 LAB
ALBUMIN SERPL-MCNC: 4.4 G/DL (ref 3.2–4.8)
ALBUMIN/GLOB SERPL: 1.5 {RATIO} (ref 1–2)
ALP LIVER SERPL-CCNC: 106 U/L
ALT SERPL-CCNC: 22 U/L
ANION GAP SERPL CALC-SCNC: 14 MMOL/L (ref 0–18)
APTT PPP: 21.9 SECONDS (ref 23–36)
AST SERPL-CCNC: 25 U/L (ref ?–34)
ATRIAL RATE: 50 BPM
ATRIAL RATE: 55 BPM
BASOPHILS # BLD AUTO: 0.03 X10(3) UL (ref 0–0.2)
BASOPHILS NFR BLD AUTO: 0.4 %
BILIRUB SERPL-MCNC: 0.4 MG/DL (ref 0.2–1.1)
BNP SERPL-MCNC: 132 PG/ML
BUN BLD-MCNC: 31 MG/DL (ref 9–23)
BUN/CREAT SERPL: 19.1 (ref 10–20)
CALCIUM BLD-MCNC: 9.1 MG/DL (ref 8.7–10.4)
CHLORIDE SERPL-SCNC: 106 MMOL/L (ref 98–112)
CO2 SERPL-SCNC: 19 MMOL/L (ref 21–32)
CREAT BLD-MCNC: 1.62 MG/DL
DEPRECATED RDW RBC AUTO: 50.3 FL (ref 35.1–46.3)
EGFRCR SERPLBLD CKD-EPI 2021: 31 ML/MIN/1.73M2 (ref 60–?)
EOSINOPHIL # BLD AUTO: 0 X10(3) UL (ref 0–0.7)
EOSINOPHIL NFR BLD AUTO: 0 %
ERYTHROCYTE [DISTWIDTH] IN BLOOD BY AUTOMATED COUNT: 13.6 % (ref 11–15)
GLOBULIN PLAS-MCNC: 2.9 G/DL (ref 2–3.5)
GLUCOSE BLD-MCNC: 279 MG/DL (ref 70–99)
GLUCOSE BLDC GLUCOMTR-MCNC: 259 MG/DL (ref 70–99)
HCT VFR BLD AUTO: 29.2 %
HGB BLD-MCNC: 9.3 G/DL
IMM GRANULOCYTES # BLD AUTO: 0.2 X10(3) UL (ref 0–1)
IMM GRANULOCYTES NFR BLD: 2.5 %
INR BLD: 1.05 (ref 0.8–1.2)
LYMPHOCYTES # BLD AUTO: 1.2 X10(3) UL (ref 1–4)
LYMPHOCYTES NFR BLD AUTO: 15.3 %
MCH RBC QN AUTO: 32.2 PG (ref 26–34)
MCHC RBC AUTO-ENTMCNC: 31.8 G/DL (ref 31–37)
MCV RBC AUTO: 101 FL
MONOCYTES # BLD AUTO: 0.37 X10(3) UL (ref 0.1–1)
MONOCYTES NFR BLD AUTO: 4.7 %
NEUTROPHILS # BLD AUTO: 6.05 X10 (3) UL (ref 1.5–7.7)
NEUTROPHILS # BLD AUTO: 6.05 X10(3) UL (ref 1.5–7.7)
NEUTROPHILS NFR BLD AUTO: 77.1 %
OSMOLALITY SERPL CALC.SUM OF ELEC: 305 MOSM/KG (ref 275–295)
PLATELET # BLD AUTO: 324 10(3)UL (ref 150–450)
POTASSIUM SERPL-SCNC: 3.9 MMOL/L (ref 3.5–5.1)
PROT SERPL-MCNC: 7.3 G/DL (ref 5.7–8.2)
PROTHROMBIN TIME: 14.3 SECONDS (ref 11.6–14.8)
Q-T INTERVAL: 432 MS
Q-T INTERVAL: 448 MS
QRS DURATION: 106 MS
QRS DURATION: 80 MS
QTC CALCULATION (BEZET): 401 MS
QTC CALCULATION (BEZET): 416 MS
R AXIS: 174 DEGREES
R AXIS: 49 DEGREES
RBC # BLD AUTO: 2.89 X10(6)UL
SODIUM SERPL-SCNC: 139 MMOL/L (ref 136–145)
T AXIS: 68 DEGREES
T AXIS: 94 DEGREES
TROPONIN I SERPL HS-MCNC: 27 NG/L
VENTRICULAR RATE: 52 BPM
VENTRICULAR RATE: 52 BPM
WBC # BLD AUTO: 7.9 X10(3) UL (ref 4–11)

## 2024-01-01 PROCEDURE — B2111ZZ FLUOROSCOPY OF MULTIPLE CORONARY ARTERIES USING LOW OSMOLAR CONTRAST: ICD-10-PCS | Performed by: INTERNAL MEDICINE

## 2024-01-01 PROCEDURE — 5A12012 PERFORMANCE OF CARDIAC OUTPUT, SINGLE, MANUAL: ICD-10-PCS | Performed by: HOSPITALIST

## 2024-01-01 PROCEDURE — 0BH17EZ INSERTION OF ENDOTRACHEAL AIRWAY INTO TRACHEA, VIA NATURAL OR ARTIFICIAL OPENING: ICD-10-PCS | Performed by: HOSPITALIST

## 2024-01-01 PROCEDURE — 027034Z DILATION OF CORONARY ARTERY, ONE ARTERY WITH DRUG-ELUTING INTRALUMINAL DEVICE, PERCUTANEOUS APPROACH: ICD-10-PCS | Performed by: INTERNAL MEDICINE

## 2024-01-01 PROCEDURE — 71045 X-RAY EXAM CHEST 1 VIEW: CPT | Performed by: EMERGENCY MEDICINE

## 2024-01-01 PROCEDURE — 31500 INSERT EMERGENCY AIRWAY: CPT | Performed by: HOSPITALIST

## 2024-01-01 PROCEDURE — 99291 CRITICAL CARE FIRST HOUR: CPT | Performed by: HOSPITALIST

## 2024-01-01 PROCEDURE — 02C03ZZ EXTIRPATION OF MATTER FROM CORONARY ARTERY, ONE ARTERY, PERCUTANEOUS APPROACH: ICD-10-PCS | Performed by: INTERNAL MEDICINE

## 2024-01-01 PROCEDURE — 02703ZZ DILATION OF CORONARY ARTERY, ONE ARTERY, PERCUTANEOUS APPROACH: ICD-10-PCS | Performed by: INTERNAL MEDICINE

## 2024-01-01 PROCEDURE — 5A1223Z PERFORMANCE OF CARDIAC PACING, CONTINUOUS: ICD-10-PCS | Performed by: INTERNAL MEDICINE

## 2024-01-01 PROCEDURE — 70450 CT HEAD/BRAIN W/O DYE: CPT | Performed by: EMERGENCY MEDICINE

## 2024-01-01 PROCEDURE — B240ZZ3 ULTRASONOGRAPHY OF SINGLE CORONARY ARTERY, INTRAVASCULAR: ICD-10-PCS | Performed by: INTERNAL MEDICINE

## 2024-01-01 PROCEDURE — 71045 X-RAY EXAM CHEST 1 VIEW: CPT | Performed by: HOSPITALIST

## 2024-01-01 RX ORDER — ASPIRIN 81 MG/1
81 TABLET ORAL DAILY
Status: DISCONTINUED | OUTPATIENT
Start: 2024-06-01 | End: 2024-01-01

## 2024-01-01 RX ORDER — MORPHINE SULFATE 2 MG/ML
2 INJECTION, SOLUTION INTRAMUSCULAR; INTRAVENOUS ONCE
Status: DISCONTINUED | OUTPATIENT
Start: 2024-01-01 | End: 2024-01-01

## 2024-01-01 RX ORDER — LEVOTHYROXINE SODIUM 137 UG/1
137 TABLET ORAL
Status: DISCONTINUED | OUTPATIENT
Start: 2024-01-01 | End: 2024-01-01

## 2024-01-01 RX ORDER — DOPAMINE HYDROCHLORIDE 320 MG/100ML
INJECTION, SOLUTION INTRAVENOUS
Status: COMPLETED
Start: 2024-01-01 | End: 2024-01-01

## 2024-01-01 RX ORDER — HEPARIN SODIUM 1000 [USP'U]/ML
60 INJECTION, SOLUTION INTRAVENOUS; SUBCUTANEOUS ONCE
Qty: 10 ML | Refills: 0 | Status: DISCONTINUED | OUTPATIENT
Start: 2024-01-01 | End: 2024-01-01

## 2024-01-01 RX ORDER — HEPARIN SODIUM AND DEXTROSE 10000; 5 [USP'U]/100ML; G/100ML
12 INJECTION INTRAVENOUS ONCE
Qty: 250 ML | Refills: 0 | Status: COMPLETED | OUTPATIENT
Start: 2024-01-01 | End: 2024-01-01

## 2024-01-01 RX ORDER — HEPARIN SODIUM 1000 [USP'U]/ML
60 INJECTION, SOLUTION INTRAVENOUS; SUBCUTANEOUS ONCE
Qty: 10 ML | Refills: 0 | Status: COMPLETED | OUTPATIENT
Start: 2024-01-01 | End: 2024-01-01

## 2024-01-01 RX ORDER — DOPAMINE HYDROCHLORIDE 320 MG/100ML
INJECTION, SOLUTION INTRAVENOUS
Status: DISCONTINUED
Start: 2024-01-01 | End: 2024-01-01 | Stop reason: WASHOUT

## 2024-01-01 RX ORDER — AMIODARONE HYDROCHLORIDE 200 MG/1
200 TABLET ORAL 2 TIMES DAILY
Status: DISCONTINUED | OUTPATIENT
Start: 2024-01-01 | End: 2024-01-01

## 2024-01-01 RX ORDER — ONDANSETRON 2 MG/ML
4 INJECTION INTRAMUSCULAR; INTRAVENOUS ONCE
Status: COMPLETED | OUTPATIENT
Start: 2024-01-01 | End: 2024-01-01

## 2024-01-01 RX ORDER — HEPARIN SODIUM AND DEXTROSE 10000; 5 [USP'U]/100ML; G/100ML
INJECTION INTRAVENOUS CONTINUOUS
Status: DISCONTINUED | OUTPATIENT
Start: 2024-01-01 | End: 2024-01-01

## 2024-01-01 RX ORDER — MIDAZOLAM HYDROCHLORIDE 1 MG/ML
INJECTION INTRAMUSCULAR; INTRAVENOUS
Status: DISCONTINUED
Start: 2024-01-01 | End: 2024-01-01 | Stop reason: WASHOUT

## 2024-01-01 RX ORDER — ONDANSETRON 2 MG/ML
INJECTION INTRAMUSCULAR; INTRAVENOUS
Status: COMPLETED
Start: 2024-01-01 | End: 2024-01-01

## 2024-01-01 RX ORDER — AMIODARONE HYDROCHLORIDE 150 MG/3ML
INJECTION, SOLUTION INTRAVENOUS
Status: COMPLETED
Start: 2024-01-01 | End: 2024-01-01

## 2024-01-01 RX ORDER — LORAZEPAM 2 MG/ML
INJECTION INTRAMUSCULAR
Status: DISCONTINUED
Start: 2024-01-01 | End: 2024-01-01

## 2024-01-01 RX ORDER — LORAZEPAM 2 MG/ML
1 INJECTION INTRAMUSCULAR ONCE
Status: COMPLETED | OUTPATIENT
Start: 2024-01-01 | End: 2024-01-01

## 2024-01-01 RX ORDER — LIDOCAINE HYDROCHLORIDE 20 MG/ML
INJECTION, SOLUTION INFILTRATION; PERINEURAL
Status: COMPLETED
Start: 2024-01-01 | End: 2024-01-01

## 2024-01-01 RX ORDER — SODIUM CHLORIDE 9 MG/ML
INJECTION, SOLUTION INTRAVENOUS CONTINUOUS
Status: ACTIVE | OUTPATIENT
Start: 2024-01-01 | End: 2024-01-01

## 2024-01-01 RX ORDER — HEPARIN SODIUM 1000 [USP'U]/ML
INJECTION, SOLUTION INTRAVENOUS; SUBCUTANEOUS
Status: COMPLETED
Start: 2024-01-01 | End: 2024-01-01

## 2024-05-23 PROBLEM — I48.0 PAROXYSMAL ATRIAL FIBRILLATION (HCC): Status: ACTIVE | Noted: 2024-01-01

## 2024-05-23 PROBLEM — N18.31 CHRONIC KIDNEY DISEASE, STAGE 3A (HCC): Status: ACTIVE | Noted: 2024-01-01

## 2024-05-23 PROBLEM — E03.9 HYPOTHYROIDISM (ACQUIRED): Status: ACTIVE | Noted: 2024-01-01

## 2024-05-23 PROBLEM — K90.829 SHORT BOWEL SYNDROME: Status: ACTIVE | Noted: 2024-01-01

## 2024-05-23 PROBLEM — M17.12 ARTHRITIS OF LEFT KNEE: Status: ACTIVE | Noted: 2024-01-01

## 2024-05-23 PROBLEM — F43.29 PROLONGED GRIEF REACTION: Status: ACTIVE | Noted: 2024-01-01

## 2024-05-23 PROBLEM — E78.5 DYSLIPIDEMIA: Status: ACTIVE | Noted: 2024-01-01

## 2024-05-23 PROBLEM — I48.91 ATRIAL FIBRILLATION (HCC): Status: ACTIVE | Noted: 2024-01-01

## 2024-05-23 PROBLEM — I49.1 PREMATURE ATRIAL CONTRACTION: Status: ACTIVE | Noted: 2024-01-01

## 2024-05-23 PROBLEM — I70.0 ATHEROSCLEROSIS OF AORTA (HCC): Status: ACTIVE | Noted: 2024-01-01

## 2024-05-30 PROBLEM — I21.3 ST ELEVATION MYOCARDIAL INFARCTION (STEMI), UNSPECIFIED ARTERY (HCC): Status: ACTIVE | Noted: 2024-01-01

## 2024-05-31 NOTE — CONSULTS
[facility]  Cardiology Consultation    Lizzie Gaytan Patient Status:  Emergency    1940 MRN [id number]   Location [facility] INTERVENTIONAL SUITES Attending No att. providers found   Hosp Day # 0 PCP YAKOV FAIR MD     Reason for Consultation:  Inferior ST elevation MI    History of Present Illness:  Lizzie Gaytan is a a(n) 83 year old female who presents with complaint of chest pain and syncope while playing slots.  EKG showed inferior ST elevation MI.  Patient was also noted to be in complete heart block.    History:  Past Medical History:    Anxiety state, unspecified    Cataract    Depression    Disorder of thyroid    High blood pressure    Hyperlipemia    MGUS (monoclonal gammopathy of unknown significance)    Ovarian ca (HCC)    Pernicious anemia    Rectal cancer (HCC)    Transfusion history    Type II or unspecified type diabetes mellitus without mention of complication, not stated as uncontrolled    Ureter cancer (HCC)     Past Surgical History:   Procedure Laterality Date    Appendectomy      Bowel resection  6/16/15     Cholecystectomy      Hernia surgery  6/16/15     ventral hernia     Hysterectomy      Knee replacement surgery Right     Other Bilateral     Bilateral toe surgery     Family History   Problem Relation Age of Onset    Cancer Sister       reports that she has quit smoking. She has never used smokeless tobacco. She reports current alcohol use. She reports that she does not use drugs.    Allergies:  Allergies   Allergen Reactions    Compazine [Prochlorperazine] OTHER (SEE COMMENTS)     Pt does not recall the reaction    Influenza Vaccines OTHER (SEE COMMENTS)     Fever and required hospitalzation    Latex RASH     pruritis    Sulfa Antibiotics RASH       Medications:    Current Facility-Administered Medications:     morphINE PF 2 MG/ML injection 2 mg, 2 mg, Intravenous, Once    heparin (Porcine) 35874 units/250mL infusion ACS/AFIB CONTINUOUS, 200-3,000  Units/hr, Intravenous, Continuous    amiodarone (Pacerone) tab 200 mg, 200 mg, Oral, BID    levothyroxine (Synthroid) tab 137 mcg, 137 mcg, Oral, Before breakfast    Review of Systems:  A comprehensive review of systems was negative if not otherwise mention in above HPI.    BP (!) 77/52   Pulse (!) 46   Temp 98.7 °F (37.1 °C)   Resp 17   Ht 5' 2\" (1.575 m)   Wt 120 lb (54.4 kg)   SpO2 91%   BMI 21.95 kg/m²   Temp (24hrs), Av.7 °F (37.1 °C), Min:98.7 °F (37.1 °C), Max:98.7 °F (37.1 °C)     No intake or output data in the 24 hours ending 24 0006  Wt Readings from Last 3 Encounters:   24 120 lb (54.4 kg)   24 135 lb (61.2 kg)   04/10/23 135 lb (61.2 kg)       Physical Exam:   General: Alert and oriented x 3. No apparent distress. No respiratory or constitutional distress.  HEENT: Normocephalic, anicteric sclera, neck supple.  Neck: No JVD, carotids 2+, no bruits.  Cardiac: Regular rate and rhythm. S1, S2 normal. No murmur, pericardial rub, S3.  Lungs: Clear without wheezes, rales, rhonchi or dullness.  Normal excursions and effort.  Abdomen: Soft, non-tender. BS-present.  Extremities: Without clubbing, cyanosis or edema.  Peripheral pulses are 2+.  Neurologic: Alert and oriented, normal affect.  Skin: Warm and dry.     Laboratory Data:  Lab Results   Component Value Date    WBC 7.9 2024    HGB 9.3 2024    HCT 29.2 2024    .0 2024    CREATSERUM 1.62 2024    BUN 31 2024     2024    K 3.9 2024     2024    CO2 19.0 2024     2024    CA 9.1 2024    ALB 4.4 2024    ALKPHO 106 2024    BILT 0.4 2024    TP 7.3 2024    AST 25 2024    ALT 22 2024    PTT 21.9 2024    INR 1.05 2024    PTP 14.3 2024    PGLU 259 2024       Imaging:  EKG reviewed  Impression:  Inferior ST ovation MI  History of A-fib  Hypertension    Recommendations:  Patient was  consented for left heart cardiac catheterization. The risks and benefits of the procedure were explained to the patient. 1-2% risk of coronary angiography, possible angioplasty, include, but are not limited to stroke, death, heart attack, coronary dissection requiring emergent CABG, hematoma, bleeding, allergic reaction to medications, vascular damage requiring surgical repair, kidney failure requiring dialysis and others. Patient wishes to proceed.     Thank you for allowing me to participate in the care of your patient.    Mars Escobedo MD  5/31/2024  12:06 AM    5

## 2024-05-31 NOTE — PROCEDURES
CODE BLUE called to room 224    On arrival patient unresponsive, pulseless CPR initiated, patient in need of intubation.  7.5 ET tube used, intubated under direct visualization, 19 cm at the lips, bilateral breath sounds appreciated, CO2 detector with color change.  Chest x-ray pending

## 2024-05-31 NOTE — H&P
[facility]  part of [facility]    History & Physical    Lizzie Gaytan Patient Status:  Emergency    1940 MRN [id number]   Location [facility] INTERVENTIONAL SUITES Attending No att. providers found   Hosp Day # 0 PCP YAKOV FAIR MD     Date:  2024  Date of Admission:  2024    Chief Complaint:  Chest pain, passed out with fall    History of Present Illness:  Lizzie Gaytan is a(n) 83 year old female, with a past medical history significant for rectal cancer, endometrial cancer, hypertension and hyperlipidemia was brought in by EMS after a fall.  Patient claims she felt pressure in her chest started become somewhat short of breath passed out fell face forward later on when she regained consciousness she continued to have chest discomfort, workup in ER indicated ST elevation MI and cardiology was notified.  Patient was taken to the Cath Lab, was found to have occlusion of the RCA requiring rotational atherectomy.    History:  Past Medical History:    Anxiety state, unspecified    Cataract    Depression    Disorder of thyroid    High blood pressure    Hyperlipemia    MGUS (monoclonal gammopathy of unknown significance)    Ovarian ca (HCC)    Pernicious anemia    Rectal cancer (HCC)    Transfusion history    Type II or unspecified type diabetes mellitus without mention of complication, not stated as uncontrolled    Ureter cancer (HCC)     Past Surgical History:   Procedure Laterality Date    Appendectomy      Bowel resection  6/16/15     Cholecystectomy      Hernia surgery  6/16/15     ventral hernia     Hysterectomy      Knee replacement surgery Right     Other Bilateral     Bilateral toe surgery     Family History   Problem Relation Age of Onset    Cancer Sister       reports that she has quit smoking. She has never used smokeless tobacco. She reports current alcohol use. She reports that she does not use drugs.    Allergies:  Allergies   Allergen Reactions     Compazine [Prochlorperazine] OTHER (SEE COMMENTS)     Pt does not recall the reaction    Influenza Vaccines OTHER (SEE COMMENTS)     Fever and required hospitalzation    Latex RASH     pruritis    Sulfa Antibiotics RASH       Home Medications:  Prior to Admission Medications   Prescriptions Last Dose Informant Patient Reported? Taking?   ALPRAZolam 0.25 MG Oral Tab   No No   Sig: TAKE 1 TABLET BY MOUTH TWICE DAILY AS NEEDED   DULoxetine 60 MG Oral Cap DR Particles   Yes No   Sig: Take 1 capsule (60 mg total) by mouth daily.   HYDROcodone-acetaminophen  MG Oral Tab   Yes No   Sig: TAKE 1 TABELT BY MOUTH EVERYDAY AS NEEDED FOR PAIN FOR 30 DAYS.   HYDROcodone-acetaminophen 5-325 MG Oral Tab   No No   Sig: Take 1 tablet by mouth every 8 (eight) hours as needed for Pain.   HYDROcodone-acetaminophen 5-325 MG Oral Tab   No No   Sig: Take 0.5-1 tablets by mouth every 4 to 6 hours as needed for Pain.   KLOR-CON M20 20 MEQ Oral Tab CR   Yes No   Sig: Take 1 tablet (20 mEq total) by mouth 2 (two) times daily.   Levothyroxine Sodium 137 MCG Oral Tab   Yes No   Sig: Take 137 mcg by mouth before breakfast.   Meclizine HCl 12.5 MG Oral Tab   Yes No   Sig: Take 1 tablet (12.5 mg total) by mouth 3 (three) times daily as needed.   Sertraline HCl 100 MG Oral Tab   No No   Sig: Take 1 tablet (100 mg total) by mouth daily.   amiodarone 200 MG Oral Tab   Yes No   Sig: Take 1 tablet (200 mg total) by mouth 2 (two) times daily.   apixaban 5 MG Oral Tab   Yes No   Sig: Take 0.5 tablets (2.5 mg total) by mouth 2 (two) times daily. Pt takes 1/2 tab started 10/17 pm dose   Patient not taking: Reported on 5/23/2024   cyanocobalamin 1000 MCG/ML Injection Solution   Yes No   Sig: Inject 1 mL (1,000 mcg total) into the skin every 30 (thirty) days. Last taken 3 weeks ago.   diphenoxylate-atropine 2.5-0.025 MG Oral Tab   Yes No   Sig: Take 1 tablet by mouth 4 (four) times daily as needed for Diarrhea.   famoTIDine 40 MG Oral Tab   No No   Sig:  Take 1 tablet (40 mg total) by mouth nightly.   gabapentin 300 MG Oral Cap   No No   Sig: Start with night time dose only. If well tolerated, increase to two times daily. If well tolerated, increase to three times daily.   Patient not taking: Reported on 9/28/2023   hydrochlorothiazide 25 MG Oral Tab   Yes No   Sig: Take 1 tablet (25 mg total) by mouth daily.   metoprolol succinate ER 50 MG Oral Tablet 24 Hr   Yes No   Sig: Take 1 tablet (50 mg total) by mouth daily.   ondansetron 8 MG Oral Tablet Dispersible   Yes No   Sig: Take 1 tablet (8 mg total) by mouth every 8 (eight) hours as needed for Nausea.   simvastatin (ZOCOR) 20 MG Oral Tab   Yes No   Sig: Take 1 tablet (20 mg total) by mouth daily.      Facility-Administered Medications Last Administration Doses Remaining   bupivacaine PF (Marcaine) 0.5% injection 5/23/2024  2:15 PM 0   lidocaine PF (Xylocaine-MPF) 1% injection 5/23/2024  2:15 PM 0   methylPREDNISolone acetate (DEPO-medrol) 80 MG/ML injection 80 mg 5/23/2024  2:15 PM 0          Review of Systems:  Constitutional:  Weakness, Fatigue.  Eye:  Negative.  Ear/Nose/Mouth/Throat:  Negative.  Respiratory:  Negative  Cardiovascular: Negative  Gastrointestinal:  Negative.  Genitourinary:  Negative  Endocrine:  Negative.  Immunologic:  Negative.  Musculoskeletal:  Negative.  Integumentary:  Negative.  Neurologic:  Negative.  Psychiatric:  Negative.  ROS reviewed as documented in chart    Physical Exam:  Temp:  [98.7 °F (37.1 °C)] 98.7 °F (37.1 °C)  Pulse:  [45-58] 46  Resp:  [13-25] 22  BP: ()/(41-65) 83/47  SpO2:  [84 %-92 %] 90 %    General:  Alert and oriented.  Diffuse skin problem:  None.  Eye:  Pupils are equal, round and reactive to light, extraocular movements are intact, Normal conjunctiva.  HENT:  Normocephalic, oral mucosa is moist.  Head:  Normocephalic, atraumatic.  Neck:  Supple, non-tender, no carotid bruit, no jugular venous distention, no lymphadenopathy, no thyromegaly.  Respiratory:   Lungs are clear to auscultation, respirations are non-labored, breath sounds are equal, symmetrical chest wall expansion.  Cardiovascular:  Normal rate, regular rhythm, no murmur, no edema.  Gastrointestinal:  Soft, non-tender, non-distended, normal bowel sounds, no organomegaly.  Lymphatics:  No lymphadenopathy neck, axilla, groin.  Musculoskeletal: Normal range of motion.  normal strength.  Feet:  Normal pulses.  Neurologic:  Alert, oriented, no focal deficits, cranial nerves II-XII are grossly intact.  Cognition and Speech:  Oriented, speech clear and coherent.  Psychiatric:  Cooperative, appropriate mood & affect.      Laboratory Data:   Lab Results   Component Value Date    WBC 7.9 05/30/2024    HGB 9.3 05/30/2024    HCT 29.2 05/30/2024    .0 05/30/2024    CREATSERUM 1.62 05/30/2024    BUN 31 05/30/2024     05/30/2024    K 3.9 05/30/2024     05/30/2024    CO2 19.0 05/30/2024     05/30/2024    CA 9.1 05/30/2024    ALB 4.4 05/30/2024    ALKPHO 106 05/30/2024    BILT 0.4 05/30/2024    TP 7.3 05/30/2024    AST 25 05/30/2024    ALT 22 05/30/2024    PTT 21.9 05/30/2024    INR 1.05 05/30/2024       Imaging:  No results found.     Assessment and Plan:    Acute STEMI  Patient taken to Cath Lab, cardiology on board.    Acute on chronic kidney disease stage II  Hold diuretics, avoid nephrotoxic medications.  Repeat BMP later    Persistent atrial fibrillation  Will require rate control    Prophylaxis  Subcutaneous heparin    CODE STATUS  Full    Primary care physician  YAKOV FAIR MD    Significant event  Patient returned from Cath Lab, complaining of feeling fatigue and wanting to sleep, denying any chest pain however started to have runs of V. tach, later on became bradycardic and pulseless at which time a CODE BLUE was called.  CPR initiated with multiple doses of epi, ROSC achieved however patient again became bradycardic pulseless requiring another round of CPR.  After multiple rounds of CPR  and epinephrine, cardiology at bedside initially planning on taking patient for pacemaker and balloon pump, decided against it as patient remained in asystole for over 20 minutes.  She was finally declared dead at 0318 on 5/31/2024.    MDM: High, acute illness and severe exacerbation of chronic illness posing threat to life.  IV medications requiring close inpatient monitoring 90 minutes of critical care time spent on this admission - examining patient, obtaining history, reviewing previous medical records, going over test results/imaging and discussing plan of care. All questions answered.     Disposition  Clinical course will dictate outcome      PAT NAGY MD  5/30/2024  10:46 PM

## 2024-05-31 NOTE — ED PROVIDER NOTES
Patient Seen in: [facility] Emergency Department    History   No chief complaint on file.      HPI    83-year-old female with past medical history significant for anxiety, depression, high blood pressure, high cholesterol, rectal cancer, diabetes, paroxysmal atrial fibrillation status post cardioversion, mitral regurgitation, presents to the emergency department for evaluation of chest pressure, shortness of breath, syncopal episode.  Per EMS, patient was at a bar when she had a syncopal episode.  Patient was given 324 of aspirin and route to the hospital by EMS.  Patient states that she was feeling fine earlier in the day but this evening she began feeling some discomfort in her chest along with a hard time breathing.  She states that she short of breath enough that she blacked out and had a syncopal episode hitting her head.  She states she is not having significant pain at this time in her head but continues to feel discomfort in her chest    History from Independent Source: Initial history given by EMS as stated in HPI    External Records Reviewed: Reviewed prior records available in EMR and patient previously had atrial fibrillation but was cardioverted by Dr. Hernandez and taken off of anticoagulation other than a baby aspirin at the end of last year.    History reviewed.   Past Medical History:    Anxiety state, unspecified    Cataract    Depression    Disorder of thyroid    High blood pressure    Hyperlipemia    MGUS (monoclonal gammopathy of unknown significance)    Ovarian ca (HCC)    Pernicious anemia    Rectal cancer (HCC)    Transfusion history    Type II or unspecified type diabetes mellitus without mention of complication, not stated as uncontrolled    Ureter cancer (HCC)       History reviewed.   Past Surgical History:   Procedure Laterality Date    Appendectomy      Bowel resection  6/16/15     Cholecystectomy      Hernia surgery  6/16/15     ventral hernia     Hysterectomy      Knee  replacement surgery Right     Other Bilateral     Bilateral toe surgery         Medications :  Facility-Administered Medications Prior to Admission   Medication Dose Route Frequency Provider Last Rate Last Admin    [COMPLETED] lidocaine PF (Xylocaine-MPF) 1% injection  2 mL Intra-articular Once Tulio Carreon MD   2 mL at 05/23/24 1415    [COMPLETED] bupivacaine PF (Marcaine) 0.5% injection  10 mL Intra-articular Once Tulio Carreon MD   10 mL at 05/23/24 1415    [COMPLETED] methylPREDNISolone acetate (DEPO-medrol) 80 MG/ML injection 80 mg  80 mg Intra-articular Once Tulio Carreon MD   80 mg at 05/23/24 1415     Medications Prior to Admission   Medication Sig Dispense Refill Last Dose    metoprolol succinate ER 50 MG Oral Tablet 24 Hr Take 1 tablet (50 mg total) by mouth daily.       HYDROcodone-acetaminophen  MG Oral Tab TAKE 1 TABELT BY MOUTH EVERYDAY AS NEEDED FOR PAIN FOR 30 DAYS.       DULoxetine 60 MG Oral Cap DR Particles Take 1 capsule (60 mg total) by mouth daily.       KLOR-CON M20 20 MEQ Oral Tab CR Take 1 tablet (20 mEq total) by mouth 2 (two) times daily.       apixaban 5 MG Oral Tab Take 0.5 tablets (2.5 mg total) by mouth 2 (two) times daily. Pt takes 1/2 tab started 10/17 pm dose (Patient not taking: Reported on 5/23/2024)       amiodarone 200 MG Oral Tab Take 1 tablet (200 mg total) by mouth 2 (two) times daily.       HYDROcodone-acetaminophen 5-325 MG Oral Tab Take 0.5-1 tablets by mouth every 4 to 6 hours as needed for Pain. 25 tablet 0     hydrochlorothiazide 25 MG Oral Tab Take 1 tablet (25 mg total) by mouth daily.       Meclizine HCl 12.5 MG Oral Tab Take 1 tablet (12.5 mg total) by mouth 3 (three) times daily as needed.       gabapentin 300 MG Oral Cap Start with night time dose only. If well tolerated, increase to two times daily. If well tolerated, increase to three times daily. (Patient not taking: Reported on 9/28/2023) 90 capsule 0     HYDROcodone-acetaminophen 5-325  MG Oral Tab Take 1 tablet by mouth every 8 (eight) hours as needed for Pain. 30 tablet 0     diphenoxylate-atropine 2.5-0.025 MG Oral Tab Take 1 tablet by mouth 4 (four) times daily as needed for Diarrhea.  0     ALPRAZolam 0.25 MG Oral Tab TAKE 1 TABLET BY MOUTH TWICE DAILY AS NEEDED 5 tablet 0     famoTIDine 40 MG Oral Tab Take 1 tablet (40 mg total) by mouth nightly. 30 tablet 2     Sertraline HCl 100 MG Oral Tab Take 1 tablet (100 mg total) by mouth daily. 30 tablet 2     cyanocobalamin 1000 MCG/ML Injection Solution Inject 1 mL (1,000 mcg total) into the skin every 30 (thirty) days. Last taken 3 weeks ago.  0     ondansetron 8 MG Oral Tablet Dispersible Take 1 tablet (8 mg total) by mouth every 8 (eight) hours as needed for Nausea.       Levothyroxine Sodium 137 MCG Oral Tab Take 137 mcg by mouth before breakfast.       simvastatin (ZOCOR) 20 MG Oral Tab Take 1 tablet (20 mg total) by mouth daily.           Family History   Problem Relation Age of Onset    Cancer Sister        Smoking Status:   Social History     Socioeconomic History    Marital status:    Tobacco Use    Smoking status: Former    Smokeless tobacco: Never   Vaping Use    Vaping status: Never Used   Substance and Sexual Activity    Alcohol use: Yes    Drug use: Never   Other Topics Concern    Caffeine Concern No       Constitutional and vital signs reviewed.      Social History and Family History elements reviewed from today, pertinent positives to the presenting problem noted.    Physical Exam     ED Triage Vitals [05/30/24 2140]   /60   Pulse 58   Resp 14   Temp 98.7 °F (37.1 °C)   Temp src    SpO2 (!) 84 %   O2 Device Nasal cannula       Physical Exam   Constitutional: AAOx3, well nourished, uncomfortable appearing, ashen  HEENT: Normocephalic, PERRLA, MMM, large forehead hematoma and abrasion  CV: s1s2+, RRR, no m/r/g, normal distal pulses  Pulmonary/Chest: CTA b/l with no rales, wheezes.  No chest wall tenderness  Abdominal:  Nontender.  Nondistended. Soft. Bowel sounds are normal.   Neck/Back:   :   Musculoskeletal: Normal range of motion. No deformity.   Neurological: Awake, alert. Normal reflexes. No cranial nerve deficit.    Skin: Skin is warm and dry. No rash noted. No erythema.   Psychiatric:      All measures to prevent infection transmission during my interaction with the patient were taken. The patient was already wearing a droplet mask on my arrival to the room. Personal protective equipment was worn throughout the duration of the exam.      ED Course        Labs Reviewed   COMP METABOLIC PANEL (14) - Abnormal; Notable for the following components:       Result Value    Glucose 279 (*)     CO2 19.0 (*)     BUN 31 (*)     Creatinine 1.62 (*)     Calculated Osmolality 305 (*)     eGFR-Cr 31 (*)     All other components within normal limits   BNP (B TYPE NATRIURETIC PEPTIDE) - Abnormal; Notable for the following components:    Beta Natriuretic Peptide 132 (*)     All other components within normal limits   PTT, ACTIVATED - Abnormal; Notable for the following components:    PTT 21.9 (*)     All other components within normal limits   POCT GLUCOSE - Abnormal; Notable for the following components:    POC Glucose  259 (*)     All other components within normal limits   CBC W/ DIFFERENTIAL - Abnormal; Notable for the following components:    RBC 2.89 (*)     HGB 9.3 (*)     HCT 29.2 (*)     .0 (*)     RDW-SD 50.3 (*)     All other components within normal limits   TROPONIN I HIGH SENSITIVITY - Normal   PROTHROMBIN TIME (PT) - Normal   CBC WITH DIFFERENTIAL WITH PLATELET    Narrative:     The following orders were created for panel order CBC With Differential With Platelet.                  Procedure                               Abnormality         Status                                     ---------                               -----------         ------                                     CBC W/ DIFFERENTIAL[[id number]]           Abnormal            Final result                                                 Please view results for these tests on the individual orders.   URINALYSIS WITH CULTURE REFLEX   RAINBOW DRAW LAVENDER   RAINBOW DRAW LIGHT GREEN   RAINBOW DRAW BLUE   RAINBOW DRAW GOLD     My Independent Interpretation of EKG (if performed): EKG    Rate, intervals and axes as noted on EKG Report.  Rate: 52 bpm  Rhythm: Sinus Rhythm  Reading: Sinus bradycardia, with A-V dissociation and junctional rhythm, incomplete right bundle branch block, inferolateral ST elevations with some anterior ST depressions    Second  EKG completed 2 minutes later at 2143 shows same findings of heart rate of 52 bpm, sinus bradycardia with A-V dissociation and junctional escape rhythm, incomplete right bundle branch block, inferolateral ST elevations with some reciprocal anterior ST depressions             Monitor Interpretation:   sinus bradycardia with A-V dissociation and junctional escape rhythm as interpreted by me.      Imaging Results Available and Reviewed while in ED: No results found.  ED Medications Administered:   Medications   morphINE PF 2 MG/ML injection 2 mg (2 mg Intravenous Not Given 5/30/24 2154)   heparin (Porcine) 11479 units/250mL infusion ED INITIAL DOSE (12 Units/kg/hr × 54.4 kg Intravenous New Bag 5/30/24 2214)   heparin (Porcine) 42754 units/250mL infusion ACS/AFIB CONTINUOUS (has no administration in time range)   heparin (Porcine) 1000 UNIT/ML injection - BOLUS IV 3,300 Units (3,300 Units Intravenous Given 5/30/24 2213)   midazolam (Versed) 2 MG/2ML injection (has no administration in time range)   fentaNYL (Sublimaze) 50 mcg/mL injection (has no administration in time range)   heparin in sodium chloride 0.9% (Porcine) 2 Units/mL flush bag premix (has no administration in time range)   lidocaine (Xylocaine) 2 % injection (has no administration in time range)   heparin in sodium chloride 0.9% (Porcine) 2 Units/mL flush bag  premix (has no administration in time range)   ondansetron (Zofran) 4 MG/2ML injection 4 mg (4 mg Intravenous Given 5/30/24 2208)   heparin (Porcine) 1000 UNIT/ML injection (has no administration in time range)             MDM     Vitals:    05/30/24 2222 05/30/24 2224 05/30/24 2226 05/30/24 2228   BP: (!) 84/41 (!) 80/51 (!) 82/47 (!) 83/47   Pulse: (!) 47  (!) 45 (!) 46   Resp: 13  17 22   Temp:       SpO2: (!) 88%  90% 90%   Weight:       Height:         *I personally reviewed and interpreted all ED vitals.    Independent Interpretation of Studies: Independently reviewed patient's chest x-ray and head CT.  There is no acute cardiopulmonary disease on x-ray and CT does not show any hemorrhage or fracture    Social Determinants of Health:     Procedures:      Differential/MDM/Shared Decision Making: Differential Diagnosis includes ACS, STEMI, dysrhythmia, electrolyte abnormality, dehydration, CHF, RODOLFO, COPD, others.      The patient already has history of paroxysmal atrial fibrillation not currently on anticoagulation, anxiety, depression, high blood pressure, high cholesterol, rectal cancer, diabetes, to contribute to the complexity of this ED evaluation.           EKG shows inferolateral STEMI.  Cardiac alert was called.  Patient was given aspirin in the field and started on heparin in the ED.  Discussed with cardiologist from [facility] and they will be in for taking patient to cardiac Cath Lab.  Patient initially came in normotensive but did drop her pressure to 70 systolic.  IV fluid bolus started.  EKG is also concerning for A-V dissociation which I relayed to the cardiologist.  Patient maintains heart rates in the 50s at this time that are narrow complex.  [facility] and they have accepted patient for admission.  Patient's son did call the ED and I spoke with him and gave him an update about his mother's condition.    Critical care time exclusive of procedure time spent on this patient  was 45 min for taking history from patient examining patient, medical decision-making, reviewing lab work and radiology studies, explaining results to patient and family, discussing with consultants/admitting physician, and documenting in patient's chart.      Condition upon leaving the department: Stable    Disposition and Plan     Clinical Impression:  1. ST elevation myocardial infarction (STEMI), unspecified artery (HCC)        Disposition:  Send to or/cath/gi    Follow-up:  No follow-up provider specified.    Medications Prescribed:  Current Discharge Medication List

## 2024-05-31 NOTE — PROCEDURES
[facility]  part of [facility] Cardiac Cath Procedure Note  Lizzie Gaytan Patient Status:  Emergency    1940 MRN [id number]   Location [facility] INTERVENTIONAL SUITES Attending No att. providers found   Hosp Day # 0 PCP YAKOV FAIR MD       Cardiologist: Mars Escobedo MD  Primary Proceduralist: Mars Escobedo MD  Procedure Performed: COR and rotational arthrectomy of proximal RCA/PCI of proximal and ostial RCA with ROSALVA x 2/intravascular ultrasound of proximal RCAinsertion of temporary pacemaker  Date of Procedure: 2024     Pre procedure diagnosis: Inferior ST elevation MI with complete heart block    Post procedure diagnosis: As above    Summary of Case: After written informed consent was obtained from the patient, patient was brought to the cardiac catheterization laboratory.  Patient was prepped and draped in the usual sterile fashion. Lidocaine 1% was used to infiltrate the right groin for local anesthesia and a 6 Jordanian introducer sheath was inserted into the right femoral artery.  5 Jordanian sheath was placed into the right common femoral vein and a 5 Jordanian balloontipped temporary pacemaker was floated into the atrium.  Unable to engage in passing to the ventricle, atrial pacing performed.    Selective coronary angiography performed with JR4 catheter for RCA and JL4 catheter for LCA.  Angiography performed in standard projections.      Selective right femoral angiogram done assess anatomy for closure.    Post procedure findings:  1) Left Ventriculography at 30 degrees ARMAS: Not performed, renal insufficiency  2) Hemodynamics: Not obtained  3) Coronaries:  Left main: No significant disease  LAD: Mid LAD has 60 to 70% stenosis in the midsegment  Circumflex: No significant disease  RCA: Occluded ostially with a large eccentric calcified bolder seen in the ostium.  No collaterals seen filling distal RCA    Specimen sent to: No specimen collected  Estimated blood  loss: 50 cc  Closure: Manual for artery and vein      Lesion #1 ostial and proximal RCA  Lesion Characteristics-severe torturous, severe calcified.  Type C lesion.  Pre-intervention stenosis 100%, Post intervention stenosis 0%.  Pre ROSA 0, Post ROSA 3.     Guide Catheter: JR4 guide with guide liner.  Wire: Prowater wire  Pre-dilation Balloon: 2.5 mm by x 15 mm@14 HAILE followed by 3 mm x 20 mm noncompliant balloon.  Significant calcified recoil seen.  Stent: 3 mm x 20 mm Synergy XD at the ostium  Intravascular ultrasound: Intravascular ultrasound was performed which showed no residual disease at the ostium.  Post-dilation Balloon: 3 mm x 20 mm NC@20 HAILE    After addressing the ostium, there was tight lesions in the proximal RCA.  Patient continued to have ST elevations and it was decided to intervene on the proximal RCA lesion.  Unable to pass any balloon including 1.5 mm Takera.  At this point it was decided to perform rotational arthrectomy considering the risk through if restaurant but with no other options.  Corsair was used to exchange Prowater for a Rotafloppy.  1.5 mm bur was then used to perform rotational atherectomy of the proximal RCA lesion with multiple runs after great care was taken to pass the daniella through the recently placed ostial stent.  After that a 3 mm x 15 mm compliant balloon was used to dilate the proximal RCA lesion followed by 3 mm x 20 mm Synergy XD was placed using a guide liner in the proximal RCA just after the ostial stent.  Excellent flow results noted.  All the catheters wire were withdrawn.    IV was maintained by RN and no sedation was given.      Mars Escobedo MD  05/30/24

## 2024-05-31 NOTE — ED QUICK NOTES
Patient to cath lab 2229. Heparin and fluid bolus stopped 2235 per cath lab instructions up stairs. Patient son updated by Dr. Bernal.

## 2024-05-31 NOTE — PLAN OF CARE
Called by the nurse that patient was having V. tach, bradycardia and cording.  CPR was being performed when manage reached the hospital.  CPR was ongoing for 20 to 30 minutes prior to that.  Had PEA without any pulse.  Continue due to CPR while Cath Lab team was in route.  Despite continuing CPR for total of 60 minutes between the 2 codes, unable to get any spontaneous return of circulation.  Patient was pronounced dead.  I talked to the patient's daughter-in-law on the phone who lives in [place].  Patient sent his driving to the airport trying to fly out from [place] to here.

## 2024-05-31 NOTE — PROGRESS NOTES
Pt was received from cath lab on IVF. Initially, A/O x4, followed all commands. Neurovascular checks normal. VSS. Pt nauseous/vomitting, oral care provided.No signs of aspiration. Zofran given x1. Pt was anxious, states \"takes xanax at home.\" Dr. Sosa was at bedside to see pt. Order received for IV ativan x1. Afib on heart monitor w/ controlled HR. Became less coherent, reacted to sternal rub. BP in the 60s then dropped to the 40s. IVF Bolus and levo started. Responded to treatment, was more awake, then suddenly converted to  Vtach. Less responsive, code blue was called x3. Achieved ROSC the first two times. Intubated. See Code Blue Records. Dr. Escobedo, Son, and Attending notified.  318.

## 2024-05-31 NOTE — ED QUICK NOTES
Son, Dain called asking for updates. Notified him that his mother is no longer a patient in the department and is in a procedure and that she will be admitted after the procedure. Patient's son would like updates about patient post procedure. Son's phone number in chart confirmed

## 2024-05-31 NOTE — SPIRITUAL CARE NOTE
Spiritual Care Visit Note    Patient Name: Lizzie Gaytan Date of Spiritual Care Visit: 24   : 1940 Primary Dx: <principal problem not specified>       Referred By:      Spiritual Care Taxonomy:    Intended Effects: Demonstrate caring and concern    Methods: Offer support         Visit Type/Summary:  Writer responded to code, patient being cared for by care team.  No family present.   remains available for follow up.     [facility] [phone]

## 2024-05-31 NOTE — SPIRITUAL CARE NOTE
Spiritual Care Visit Note    Patient Name: Lizzie Gaytan Date of Spiritual Care Visit: 24   : 1940 Primary Dx: <principal problem not specified>       Referred By: Referral From: Nurse;Family    Spiritual Care Taxonomy:    Intended Effects: Demonstrate caring and concern    Methods: Collaborate with care team member;Demonstrate acceptance;Encourage self care;Offer emotional support;Offer support    Interventions: Acknowledge current situation;Acknowledge response to difficult experience;Active listening;Discuss concerns;Facilitate communication;Provide hospitality;Provide compassionate touch    Visit Type/Summary:     - Spiritual Care: Responded to a request via the on call phone Consulted with RN prior to visit. Offered empathic listening and emotional support. Provided resources related to grief and grieving. Provided information regarding grief support groups. Patient and family expressed appreciation for  visit.  received call from NP in CCU stating family member of  was at [facility] and requesting information about viewing and pt belongings and next steps. Met with Pt grandson Brad who stated the pt son Marcos was in route to [facility] from airport. I gave him my # and he called when SALVADOR Rodríguez arrived. I explained we no longer do viewing's at [facility]  and had him call Public Safety about pt belongings because he needed car keys to move car from where pt was taken by ambulance. Public Safety agreed to release belongings. I escorted family to Main entrance and provided support until PS arrived. Belongings were given to family and release was signed. I gave family bereavement folder, explained contents, and instructed to call PS # in folder when they have arrangements with  home.     Spiritual Care support can be requested via an Epic consult. For urgent/immediate needs, please contact the On Call  at: [place]: ext [phone]    Rev. Santos Emerson MDiv

## 2024-06-04 LAB
ISTAT ACTIVATED CLOTTING TIME: 185 SECONDS (ref 125–137)
ISTAT ACTIVATED CLOTTING TIME: 223 SECONDS (ref 125–137)
ISTAT ACTIVATED CLOTTING TIME: 228 SECONDS (ref 125–137)

## 2024-06-05 NOTE — DISCHARGE SUMMARY
[facility]  Discharge Summary    Lizzie Gaytan Patient Status:  Inpatient    1940 MRN [id number]   Location [facility] 2W/SW Attending No att. providers found   Hosp Day # 1 PCP YAKOV FAIR MD     Date of Admission: 2024    Date of Discharge: 2024  7:00 AM    Discharge Diagnosis:   Cardiopulmonary arrest  Acute STEMI    Procedures: Cardiac cath    PCP: YAKOV FAIR MD    Consultations: Dr. Mars Escobedo    Discharge Medications:   Discharge Medication List as of 2024  7:01 AM        CONTINUE these medications which have NOT CHANGED    Details   metoprolol succinate ER 50 MG Oral Tablet 24 Hr Take 1 tablet (50 mg total) by mouth daily., Historical      DULoxetine 60 MG Oral Cap DR Particles Take 1 capsule (60 mg total) by mouth daily., Historical      KLOR-CON M20 20 MEQ Oral Tab CR Take 1 tablet (20 mEq total) by mouth 2 (two) times daily., Historical, DEWAYNE      hydrochlorothiazide 25 MG Oral Tab Take 1 tablet (25 mg total) by mouth daily., Historical      Meclizine HCl 12.5 MG Oral Tab Take 1 tablet (12.5 mg total) by mouth 3 (three) times daily as needed., Historical      diphenoxylate-atropine 2.5-0.025 MG Oral Tab Take 1 tablet by mouth 4 (four) times daily as needed for Diarrhea., Historical, R-0      ALPRAZolam 0.25 MG Oral Tab TAKE 1 TABLET BY MOUTH TWICE DAILY AS NEEDED, Print Script, Disp-5 tablet, R-0      famoTIDine 40 MG Oral Tab Take 1 tablet (40 mg total) by mouth nightly., Normal, Disp-30 tablet, R-2      Sertraline HCl 100 MG Oral Tab Take 1 tablet (100 mg total) by mouth daily., Normal, Disp-30 tablet, R-2      cyanocobalamin 1000 MCG/ML Injection Solution Inject 1 mL (1,000 mcg total) into the skin every 30 (thirty) days. Last taken 3 weeks ago., Historical, R-0      Levothyroxine Sodium 137 MCG Oral Tab Take 137 mcg by mouth before breakfast., Historical      simvastatin (ZOCOR) 20 MG Oral Tab Take 1 tablet (20 mg total) by mouth daily., Historical       HYDROcodone-acetaminophen  MG Oral Tab TAKE 1 TABELT BY MOUTH EVERYDAY AS NEEDED FOR PAIN FOR 30 DAYS., Historical      apixaban 5 MG Oral Tab Take 0.5 tablets (2.5 mg total) by mouth 2 (two) times daily. Pt takes 1/2 tab started 10/17 pm dose, Historical      amiodarone 200 MG Oral Tab Take 1 tablet (200 mg total) by mouth 2 (two) times daily., Historical      !! HYDROcodone-acetaminophen 5-325 MG Oral Tab Take 0.5-1 tablets by mouth every 4 to 6 hours as needed for Pain., Normal, Disp-25 tablet, R-0      gabapentin 300 MG Oral Cap Start with night time dose only. If well tolerated, increase to two times daily. If well tolerated, increase to three times daily., Normal, Disp-90 capsule, R-0      !! HYDROcodone-acetaminophen 5-325 MG Oral Tab Take 1 tablet by mouth every 8 (eight) hours as needed for Pain., Normal, Disp-30 tablet, R-0      ondansetron 8 MG Oral Tablet Dispersible Take 1 tablet (8 mg total) by mouth every 8 (eight) hours as needed for Nausea., Historical       !! - Potential duplicate medications found. Please discuss with provider.          Follow up Visits:   No follow-up provider specified.    Follow up Labs: None    History of Present Illness: 83 year old female, with a past medical history significant for rectal cancer, endometrial cancer, hypertension and hyperlipidemia was brought in by EMS after a fall.  Patient claims she felt pressure in her chest started become somewhat short of breath passed out fell face forward later on when she regained consciousness she continued to have chest discomfort, workup in ER indicated ST elevation MI and cardiology was notified.  Patient was taken to the Cath Lab, was found to have occlusion of the RCA requiring rotational atherectomy.    Hospital Course: Patient returned from Cath Lab, complaining of feeling fatigue and wanting to sleep, denying any chest pain however started to have runs of V. tach, later on became bradycardic and pulseless at which  time a CODE BLUE was called.  CPR initiated with multiple doses of epi, ROSC achieved however patient again became bradycardic pulseless requiring another round of CPR.  After multiple rounds of CPR and epinephrine, cardiology at bedside initially planning on taking patient for pacemaker and balloon pump, decided against it as patient remained in asystole for over 20 minutes.  She was finally declared dead at 0318 on 2024.    Physical Exam:  Not applicable    Disposition:     Hospital Discharge Diagnoses:   Cardiopulmonary arrest  Acute STEMI  Acute on chronic kidney disease  Persistent atrial fibrillation    TCM Diagnosis at discharge from Hospital: Acute Myocardial Infarction   Please note that only DMG and EMG patients enrolled in the Medicare ACO, [facility] ACO and [facility] HMOs will be handled by a member of the Care Management Team.  For all other patients, please follow usual protocol for discharge care transition.    Risk of readmission: Lizzie Gaytan       PAT NAGY MD  2024  5:59 AM    Timespent> 30 mins

## 2025-02-07 NOTE — ED INITIAL ASSESSMENT (HPI)
Pt presents from [facility] stating she finished her last chemo and radiation for colon CA on Friday and has been experiencing diarrhea ever since. Pt denies vomiting or abdominal pain. +nausea.
supervision
Nsaids Counseling: NSAID Counseling: I discussed with the patient that NSAIDs should be taken with food. Prolonged use of NSAIDs can result in the development of stomach ulcers.  Patient advised to stop taking NSAIDs if abdominal pain occurs.  The patient verbalized understanding of the proper use and possible adverse effects of NSAIDs.  All of the patient's questions and concerns were addressed.

## (undated) DIAGNOSIS — D21.11 BENIGN CONNECTIVE TISSUE NEOPLASM OF FINGER, RIGHT: ICD-10-CM

## (undated) DIAGNOSIS — M67.441 GANGLION OF RIGHT HAND: Primary | ICD-10-CM

## (undated) DEVICE — NEEDLE HPO 18GA 1.5IN ECLPS

## (undated) DEVICE — HEMOSPRAY ENDOSCOPIC HEMOSTAT: Brand: HEMOSPRAY

## (undated) DEVICE — GAUZE SPONGES,12 PLY: Brand: CURITY

## (undated) DEVICE — NEEDLE 18G 1-1/2 BLUNT FILL

## (undated) DEVICE — SYRINGE MNJCT 10ML LF STRL REG

## (undated) DEVICE — OMNIPAQUE 240ML VIAL

## (undated) DEVICE — TOWEL OR BLU 16X26 STRL

## (undated) DEVICE — NEEDLE CONTRAST INTERJECT 25G

## (undated) DEVICE — 3 ML SYRINGE LUER-LOCK TIP: Brand: MONOJECT

## (undated) DEVICE — Device: Brand: CUSTOM PROCEDURE KIT

## (undated) DEVICE — MEDI-VAC NON-CONDUCTIVE SUCTION TUBING 6MM X 1.8M (6FT.) L: Brand: CARDINAL HEALTH

## (undated) DEVICE — STANDARD HYPODERMIC NEEDLE,POLYPROPYLENE HUB: Brand: MONOJECT

## (undated) DEVICE — 20 ML SYRINGE LUER-LOCK TIP: Brand: MONOJECT

## (undated) DEVICE — PEN: MARKING STD PT 100/CS: Brand: MEDICAL ACTION INDUSTRIES

## (undated) DEVICE — CLIP LGT 11MM OPEN 2.8MM 235CM

## (undated) DEVICE — 35 ML SYRINGE REGULAR TIP: Brand: MONOJECT

## (undated) DEVICE — Device: Brand: DEFENDO AIR/WATER/SUCTION AND BIOPSY VALVE

## (undated) DEVICE — GAMMEX® PI HYBRID SIZE 7, STERILE POWDER-FREE SURGICAL GLOVE, POLYISOPRENE AND NEOPRENE BLEND: Brand: GAMMEX

## (undated) DEVICE — CHLORAPREP ORANGE TINT 10.5ML

## (undated) DEVICE — 6 ML SYRINGE LUER-LOCK TIP: Brand: MONOJECT

## (undated) DEVICE — LINE MNTR ADLT SET O2 INTMD

## (undated) DEVICE — COVER STND 54X23IN MAYO REINF

## (undated) DEVICE — SET XTN .1IN 2.7ML 20IN IV

## (undated) NOTE — LETTER
Hospital Discharge Documentation  Patient was discharged to [facility] at [phone].     From: [address] Hospitalist's Office  Phone: [phone]    Patient discharged time/date: 4/25/2020  2:37 PM  Patient discharge disposition:  SNF Mixed hyperlipidemia     PAC (premature atrial contraction)     Pelvic mass     Post-operative state     S/P total knee replacement     Severe back pain     Spondylosis of lumbar joint     Compression fracture of thoracic vertebra with routine healing Nausea  -improved with scheduled zofran     Acute renal failure  -likely related to dehydration  -cautiously hydrate with ivf - saline lock now  -vazquez nephrology eval  -resolved     FEN  -replete K an Mg     HTN  -home meds losartan, atenolol  -hold losarta cyclobenzaprine 5 MG Tabs  Commonly known as:  FLEXERIL      Take 5 mg by mouth 3 (three) times daily as needed for Muscle spasms.    Refills:  0     Dicyclomine HCl 10 MG Caps  Commonly known as:  BENTYL      Take 1 capsule (10 mg total) by mouth 4 (four) STOP taking these medications    acetaminophen 325 MG Tabs  Commonly known as:  TYLENOL        HYDROcodone-acetaminophen 5-325 MG Tabs  Commonly known as:  NORCO        Loperamide HCl 2 MG Caps  Commonly known as:  IMODIUM        losartan 100 MG Tabs

## (undated) NOTE — LETTER
[facility] SURGERY SCHEDULING FORM   [address] [place] [address]   [phone] (scheduling phone) [phone] (scheduling fax)     PATIENT INFORMATION   Last Name:      Cynthia Mayer      First Name:    Sydni Perkins []  Spinal  []  No Anesthesia   [x]  Yes  []  No or using our own   Allergies: Compazine [Prochlorperazine] and Sulfa Antibiotics         Completed by:    Federico Early      Date:    10/28/2020

## (undated) NOTE — LETTER
[address] [facility], [facility] [address]     I agree to have a Peripherally Inserted Central Catheter (PICC) placed in my arm.      1. The PICC insertion procedure, care, maintenance, risks, benefits, and complications Statement of Physician: My signature below affirms that prior to the time of the PICC line insertion, I have explained to the patient and/or his/her legal representative, the risks and benefits involved in the proposed treatment and any reasonable alternat

## (undated) NOTE — LETTER
[facility] SURGERY SCHEDULING FORM   [address] [place] [address]   [phone] (scheduling phone) [phone] (scheduling fax)     PATIENT INFORMATION   Last Name:      Hugo Roberts      First Name:    Otoniel Manning []  No or using our own   Allergies: Compazine [Prochlorperazine]; Sulfa Antibiotics    +++++ Can patient be given last spot at 0830+++++       Completed by:    Landen Rizvi      Date:    3/5/2020

## (undated) NOTE — LETTER
[facility] SURGERY SCHEDULING FORM   [address] [place] [address]   [phone] (scheduling phone) [phone] (scheduling fax)     PATIENT INFORMATION   Last Name:      Jacques Baker      First Name:    Shantelle Baker []  Spinal  []  No Anesthesia   [x]  Yes  []  No or using our own   Allergies: Compazine [Prochlorperazine]; Sulfa Antibiotics         Completed by:    Lakeshia Butt      Date:    8/6/2020

## (undated) NOTE — LETTER
22      Patient: Aditya Rodriguez  : 1940 Visit date: 2022    Dear Tanya Jacinto,      I examined your patient in consultation today. She has an enlarging mass of the right hand thumb index web. We will plan on surgical excision. Thank you for your kind referral. If I may answer any questions, please feel free to contact me.      Sincerely,   Xiao Caputo MD     CC:   No Recipients

## (undated) NOTE — IP AVS SNAPSHOT
Patient Demographics     Address  [facility] [address] Phone  [phone] [facility]) *Preferred*  [phone] [facility]) E-mail Address  [email or web address]      Emergency Contact(s)     Name Relation Home Work [facility] TAKE 1-2 TABLETS BY MOUTH 4 (FOUR) TIMES DAILY AS NEEDED FOR DIARRHEA. MADIHA Ritchie         famotidine 40 MG Tabs  Commonly known as:  PEPCID  Next dose due:  TONIGHT      Take 1 tablet (40 mg total) by mouth nightly.    Lluvia Bobo MD Order ID Medication Name Action Time Action Reason Comments    [id number] ALPRAZolam Marymarko Contreras) tab 0.25 mg 02/18/20 0035 Given      [id number] HYDROcodone-acetaminophen (NORCO) 5-325 MG per tab 1 tablet 02/17/20 1927 Given      [id number] HYDROcodone-acetamino Author:  Joanne Alvarez DO Service:  Physical Medicine and Rehabilitation Author Type:  Physician    Filed:  2/17/2020  4:56 PM Date of Service:  2/17/2020  4:54 PM Status:  Signed    :  Joanne Alvarez DO (Physician)         [facility] losartan 100 MG Oral Tab, Take 100 mg by mouth daily. , Disp: , Rfl: , 2/14/2020 at Unknown time  traMADol HCl 50 MG Oral Tab, Take 50 mg by mouth every 6 (six) hours as needed.   , Disp: , Rfl: , Past Week at Unknown time  KLOR-CON M10 10 MEQ Oral Tab CR, breakfast.  , Disp: , Rfl: , 2/14/2020 at Unknown time  simvastatin (ZOCOR) 20 MG Oral Tab, Take 20 mg by mouth daily.   , Disp: , Rfl: , 2/14/2020 at Unknown time  Pantoprazole Sodium (PROTONIX) 40 MG Oral Tab EC, Take 40 mg by mouth every morning before b Pernicious anemia     Anxiety     History of bladder cancer     Hypothyroidism     Mixed hyperlipidemia     PAC (premature atrial contraction)     Pelvic mass     Post-operative state     S/P total knee replacement     Severe back pain      Caudal Epidu in the bathroom and noticed acute exacerbation of her symptoms where she was having a difficult time standing up from the toilet. She is now having severe low back pain with radiation to bilateral legs right worse than left.   Pain is described as sharp sh • BOWEL RESECTION  6/16/15    • CHOLECYSTECTOMY     • FLEXIBLE SIGMOIDOSCOPY N/A 8/14/2019    Performed by Andrew Dinero MD at [facility] ENDOSCOPY   • HERNIA SURGERY  6/16/15     ventral hernia    • HYSTERECTOMY     • KNEE REPLACEMENT SURGERY Right[YP.2] [COMPLETED] potassium chloride 40 mEq in sodium chloride 0.9% 250 mL IVPB, 40 mEq, Intravenous, Once  [COMPLETED] Gadoterate Meglumine (DOTAREM) 7.5 MMOL/15ML injection 15 mL, 15 mL, Intravenous, ONCE PRN  ALPRAZolam (XANAX) tab 0.25 mg, 0.25 mg, Oral, BID Gastrointestinal ROS: no abdominal pain, change in bowel habits, or black or bloody stools  Genito-Urinary ROS: positive for - urinary retention  Musculoskeletal ROS: positive for - gait disturbance and muscular weakness  Neurological ROS: positive for - n RDW 12.2 02/14/2020    .0 02/14/2020    MPV 7.7 11/12/2018     Lab Results   Component Value Date     (H) 02/15/2020    BUN 17 02/15/2020    BUNCREA 19.8 02/15/2020    CREATSERUM 0.86 02/15/2020    ANIONGAP 4 02/15/2020    GFRNAA 64 02/15/20 of L4 on L5 with moderate to severe spinal stenosis with severe foraminal stenosis left-sided as well as multilevel disc and facet degeneration. Additionally, she has bilateral sacral insufficiency fractures and a chronic vertebral compression fracture. Author:  Katherine Durbin DO Service:  Physical Medicine and Rehabilitation Author Type:  Physician    Filed:  2/17/2020  5:00 PM Date of Service:  2/17/2020  4:59 PM Status:  Signed    :  Katherine Durbin DO (Physician)       Outpatient Surgery Brief gait pattern. There ex.l       ex. The patient's Approx Degree of Impairment: 50.57% has been calculated based on documentation in the [facility] '6 clicks' Inpatient Basic Mobility Short Form.   Research supports that patients with this level of impairment may b Assistive Device: Rolling walker  Pattern: Shuffle  Stoop/Curb Assistance: Not tested       Additional information:     THERAPEUTIC EXERCISES  Lower Extremity AP,QS,GS     Position   supine     Patient End of Session: Up in chair;Call light within reach;RN awareness. Limited amb distance due to pain There ex.l       ex. The patient's Approx Degree of Impairment: 50.57% has been calculated based on documentation in the [facility] '6 clicks' Inpatient Basic Mobility Short Form.   Research supports that patients with Distance (ft): 10 ft  Assistive Device: Rolling walker  Pattern: Shuffle  Stoop/Curb Assistance: Not tested       Additional information:     THERAPEUTIC EXERCISES  Lower Extremity AP,QS,GS     Position   supine     Patient End of Session: Up in chair;Call mobility, transfers, gait, standing tolerance, which are below the patient's pre-admission status. Therapist spoke with nurse prior to seeing patient. PT tech present to assist with session. Pt reported a great deal of pain but agreed to treatment.   Pt • Cataract    • Depression    • High blood pressure    • MGUS (monoclonal gammopathy of unknown significance)    • Ovarian ca [facility])    • Pernicious anemia    • Rectal cancer (HCC)    • Transfusion history    • Type II or unspecified type diabetes mellitus O2 WALK[JS.1]  SPO2 on Room Air at Rest: 98[JS.2]               AM-PAC '6-Clicks' INPATIENT SHORT FORM - BASIC MOBILITY  How much difficulty does the patient currently have. ..[JS.1]  -   Turning over in bed (including adjusting bedclothes, sheets and blank JS.1 Camilo Snellen, PT on 2/16/2020  2:56 PM  JS. 2 - Raquel Henriquez, PT on 2/16/2020  2:57 PM                     Occupational Therapy Notes (last 72 hours) (Notes from 2/15/2020 11:55 AM through 2/18/2020 11:55 AM)    No notes of this type exist fo

## (undated) NOTE — LETTER
[address]  [facility] [address], [place], [place]  [address]  INFORMED CONSENT FOR TRANSFUSION OF BLOOD OR BLOOD PRODUCTS  My physician has informed me of the nature, purpose, benefits and risks of transfusion for blood and blood components that ______________________________________________  (Signature of Patient)                                                            (Responsible party in case of Minor,

## (undated) NOTE — IP AVS SNAPSHOT
[facility]            (For Outpatient Use Only) Initial Admit Date: 4/12/2020   Inpt/Obs Admit Date: Inpt: 4/12/20 / Obs: N/A   Discharge Date:    Boris Sy:  [de-identified]   MRN: [de-identified]   CSN: [id number]   CEID: [id number]        A Subscriber Name:  Kim Shmuel :    Subscriber ID:  Pt Rel to Subscriber:    Hospital Account Financial Class: Medicare    2020

## (undated) NOTE — IP AVS SNAPSHOT
[facility]            (For Outpatient Use Only) Initial Admit Date: 2/14/2020   Inpt/Obs Admit Date: Inpt: 2/14/20 / Obs: N/A   Discharge Date:    Eleni Peralesos:  [de-identified]   MRN: [de-identified]   CSN: [id number]   CEID: [id number]        QPY Subscriber Name:  Livia Martin :    Subscriber ID:  Pt Rel to Subscriber:    Hospital Account Financial Class: Medicare    2020

## (undated) NOTE — IP AVS SNAPSHOT
Patient Demographics     Address  [facility] [address] Phone  [phone] [facility]) *Preferred*  [phone] [facility]) E-mail Address  [email or web address]      Emergency Contact(s)     Name Relation Home Work [facility] office. If you are not able to wear a facemask (for example, because it causes trouble breathing), then people who live with you should not stay in the same room with you, or they should wear a facemask if they enter your room.   Cover your coughs and sneez Monitor your symptoms  Seek prompt medical attention if your illness is worsening (e.g., difficulty breathing). Before seeking care, call your healthcare provider and tell them that you have, or are being evaluated for, COVID-19.  Put on a facemask before y 7. Wash your hands often with soap and water for at least 20 seconds or clean your hands with an alcohol-based hand  that contains at least 60% alcohol. 8. As much as possible, stay in a specific room and away from other people in your home.  Also Take 50 mg by mouth daily. cyanocobalamin 1000 MCG/ML Soln  Commonly known as:  VITAMIN B12      Inject 1 mL (1,000 mcg total) into the skin every 30 (thirty) days.    Juanis Cormier MD         cyclobenzaprine 5 MG Tabs  Commonly known as:  Damari Eric ondansetron 8 MG Tbdp  Commonly known as:  ZOFRAN-ODT      Take 8 mg by mouth every 8 (eight) hours as needed for Nausea.           PEG 3350 Pack  Commonly known as:  MIRALAX  Next dose due:  HOLD STOOL SOFTENERS DUE TO DIARRHEA      Take 17 g by mo [id number] Potassium Chloride ER (K-DUR M20) CR tab 40 mEq 04/24/20 1543 Given      [id number] Sertraline HCl (ZOLOFT) tab 100 mg 04/24/20 1938 Given      [id number] Vitamin C tab 500 mg 04/24/20 1938 Given      [id number] Vitamin C tab 500 mg 04/25/20 0743 G Patient Weight  57.2 kg (126 lb 3.2 oz)         Lab Results Last 24 Hours      MAGNESIUM [[id number]] (Normal)  Resulted: 04/25/20 0641, Result status: Final result   Ordering provider:  Jasmyn Rouse MD  04/24/20 2300 Resulting lab:  [address] Potassium 4.2 3.5 - 5.1 mmol/L — [place] [facility])            Testing Performed By     Lab - Abbreviation Name Director Address Valid Date Range    [address] [facility]) [facility] LAB ([facility]) Aishwarya Garcia. Bruno Cooper M.D. [facility]. [address] Procedure                               Abnormality         Status                     ---------                               -----------         ------                     STOOL CULTURE(P)[[id number]]                                 Final result H&P signed by Mariela Bolden MD at 4/12/2020  4:19 PM  Version 2 of 2    Author:  Mariela Bolden MD Service:  Hospitalist Author Type:  Physician    Filed:  4/12/2020  4:19 PM Date of Service:  4/12/2020  1:39 PM Status:  Addendum    :  Tan Wallace • FLEXIBLE SIGMOIDOSCOPY N/A 8/14/2019    Performed by Yisroel Dubin, MD at [facility] ENDOSCOPY   • HERNIA SURGERY  6/16/15     ventral hernia    • HYSTERECTOMY     • KNEE REPLACEMENT SURGERY Right      Family History   Problem Relation Age of Onset   • Cancer famoTIDine 40 MG Oral Tab, Take 1 tablet (40 mg total) by mouth nightly. Sertraline HCl 100 MG Oral Tab, Take 1 tablet (100 mg total) by mouth daily.   cyanocobalamin 1000 MCG/ML Injection Solution, Inject 1 mL (1,000 mcg total) into the skin every 30 (thi CREATSERUM 2.03 (H) 04/12/2020    BUN 45 (H) 04/12/2020     04/12/2020    K 2.3 (LL) 04/12/2020     04/12/2020    CO2 22.0 04/12/2020     (H) 04/12/2020    CA 8.6 04/12/2020    ALB 3.1 (L) 04/12/2020    ALKPHO 101 04/12/2020    BILT 0.3 Based on patients current state of illness, I anticipate that, after discharge, patient will require TBD.[FG.1]        Electronically signed by Massmio De La Cruz MD on 4/12/2020  4:19 PM   Attribution Key    FG. 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)      Past Surgical History:   Procedure Laterality Date   • APPENDECTOMY     • BOWEL RESECTION  6/ needed for Pain. HYDROcodone-acetaminophen 5-325 MG Oral Tab, Take 1 tablet by mouth every 8 (eight) hours as needed for Pain.  magnesium oxide 400 MG Oral Tab, Take 0.5 tablets (200 mg total) by mouth daily.   ALPRAZolam 0.25 MG Oral Tab, TAKE 1 TABLET BY HEART:  S1 and S2 heard. RRR   LUNGS:  Air entry was good. No crackles or wheezes   ABDOMEN: Soft and non-tender. Bowel sounds were present. MUSCULOSKELETAL:  There was no deformity. There was full range of motion in all the extremities.    EXTREMITIES -cautiously hydrate with ivf  -NS+ 20meq K+ at 75 cc/hr  -vazquez nephrology eval    FEN  -replete K an Mg    HTN  -home meds losartan, atenolol  -hold losartan  -cont atenolol 25mg  (home dose 50)    Prophylaxis  -hep sq    Dispo--> pending[FG.3]    [employer] [place] GOMEZ had sore throat, congestion, headaches when she has the fever, nausea and dry heaves as well as diarrhea. She is currently on erythromycin with 2 days left. She does have a history of partial bowel resection with chronic diarrhea.   Her cough is been nonp potassium chloride 40 mEq in sodium chloride 0.9% 250 mL IVPB, 40 mEq, Intravenous, Once      (Not in a hospital admission)      Allergies    Compazine [Prochlor*    OTHER (SEE COMMENTS)    Comment:Pt does not recall the reaction  Sulfa Antibiotics       R Supportive care    3 - Diarrhea  IVF  Stool cx    4 - Anemia/MGUS  Monitor Hg for now    5 - h/o bladder/ureteral/endometrial ca    6 - distended bladder  Will need jaeger cath    D/w pt and Dr. Sydney Lisa, d/w Dr Anoop Hernandez    Thank you for allowing me to parti Hypomagnesemia     Diarrhea due to malabsorption     Diarrhea     Diarrhea, unspecified type     Weakness     Chemotherapy induced diarrhea     Pernicious anemia     Anxiety     History of bladder cancer     Hypothyroidism     Mixed hyperlipidemia     P covid status this can only be done on Thursday  -started colchicine  -pain resolved     Recurrent fevers  -ID consulted  -check blood cx, urine cx, also left knee eval as above     Diarrhea, unspecified type  -chronic - stool study neg     Nausea  -improve Commonly known as:  TENORMIN      Take 50 mg by mouth daily. Refills:  0     cyanocobalamin 1000 MCG/ML Soln  Commonly known as:  VITAMIN B12      Inject 1 mL (1,000 mcg total) into the skin every 30 (thirty) days.    Refills:  0     cyclobenzaprine 5 MG Sertraline HCl 100 MG Tabs  Commonly known as:  ZOLOFT      Take 1 tablet (100 mg total) by mouth daily. Quantity:  30 tablet  Refills:  2     simvastatin 20 MG Tabs  Commonly known as:  ZOCOR      Take 20 mg by mouth daily.    Refills:  0        STOP taylor PHYSICAL THERAPY TREATMENT NOTE - INPATIENT     Room Number: 421/421-A       Presenting Problem: +COVID-19    Problem List  Principal Problem:    IANCI-67 virus infection  Active Problems:    Hypokalemia    Hypomagnesemia    Diarrhea, unspecified type    A Precautions: (isolation: contact and droplet)    WEIGHT BEARING RESTRICTION  Weight Bearing Restriction: None                PAIN ASSESSMENT   Rating: Unable to rate  Location: LLE and low back  Management Techniques: Activity promotion; Body mechanics; Repo Patient Goal Patient's self-stated goal is: return to PLOF   Goal #1 Patient is able to demonstrate supine - sit EOB @ level: independent      Goal #1   Current Status  Min/Mod A x 1-2   Goal #2 Patient is able to demonstrate transfers Sit to/from [facility] RN cleared pt for participation in occupational therapy session, which was completed in collaboration with physical therapy. Upon arrival, pt supine in bed and agreeable to activity. No family present during session.  During this session, pt limited by yazan training; Endurance training;Equipment eval/education    SUBJECTIVE  \"I feel better\"     OBJECTIVE  Precautions: (isolation: contact and droplet)    WEIGHT BEARING RESTRICTION  Weight Bearing Restriction: None                PAIN ASSESSMENT  Ratin  Lo Frequency: 3x/wk[KH. 1]            Attribution [facility]. 1 - Josué Woodruff OT on 4/24/2020  5:12 PM  ADELAIDA. 2 - Ni Brown, OT on 4/24/2020  5:20 PM               Occupational Therapy Note signed by Josué Woodruff OT at 4/24/20 A --feet sliding out in front of pt. During initial evaluation 4/16, pt stood with CGA. To maximize safety, pt was returned to EOB with knees blocked. Completed stand pivot t/f with Max A x 2.      Pt's upper extremity strength and range of motion are [place] -   Eating meals?: None    AM-PAC Score:  Score: 17  Approx Degree of Impairment: 50.11%  Standardized Score (AM-PAC Scale): 37.26  CMS Modifier (G-Code): CK    FUNCTIONAL TRANSFER ASSESSMENT  Supine to Sit : Moderate assistance  Sit to Stand: Maximum assi

## (undated) NOTE — LETTER
[address] [place] [address] Rd, [place], [place]     AUTHORIZATION FOR SURGICAL OPERATION OR PROCEDURE    I hereby authorize Dr. Rosalva Caraballo MD, my Physician(s) and whomever may be designated as the doctor's Assistant, to perform the foll 4. I consent to the photographing of procedure(s) to be performed for the purposes of advancing medicine, science and/or education, provided my identity is not revealed.  If the procedure has been videotaped, the physician/surgeon will obtain the original v (Witness signature)                                                                                                  (Date)                                (Time)  STATEMENT OF PHYSICIAN My signature below affirms that prior to the time of the procedure;  I

## (undated) NOTE — LETTER
[address] [place] [address]  Authorization for Invasive Procedures  1.  I hereby authorize Dr. Catherine Berry , my physician and whomever may be designated as the doctor's assistant, to perform the following operation and/or procedure: but not all, of the potential risks that can occur: fever and allergic reactions, hemolytic reactions, transmission of disease such as hepatitis, AIDS, cytomegalovirus (CMV), and flluid overload.  In the event that I wish to have autologous transfusions of or desirable in the judgment of my physician.      Signature of Patient:  ________________________________________________ Date: _________Time: _________    Responsible person in case of minor or unconscious: _____________________________Relationship: _____